# Patient Record
Sex: FEMALE | Race: WHITE | NOT HISPANIC OR LATINO | Employment: OTHER | URBAN - METROPOLITAN AREA
[De-identification: names, ages, dates, MRNs, and addresses within clinical notes are randomized per-mention and may not be internally consistent; named-entity substitution may affect disease eponyms.]

---

## 2017-01-04 DIAGNOSIS — Z23 NEED FOR VACCINATION WITH 13-POLYVALENT PNEUMOCOCCAL CONJUGATE VACCINE: ICD-10-CM

## 2017-01-04 PROCEDURE — 90670 PCV13 VACCINE IM: CPT | Performed by: FAMILY MEDICINE

## 2017-01-04 PROCEDURE — G0009 ADMIN PNEUMOCOCCAL VACCINE: HCPCS | Performed by: FAMILY MEDICINE

## 2017-01-05 DIAGNOSIS — Z13.228 SCREENING FOR METABOLIC DISORDER: ICD-10-CM

## 2017-01-05 DIAGNOSIS — Z13.220 ENCOUNTER FOR LIPID SCREENING FOR CARDIOVASCULAR DISEASE: ICD-10-CM

## 2017-01-05 DIAGNOSIS — Z13.6 ENCOUNTER FOR LIPID SCREENING FOR CARDIOVASCULAR DISEASE: ICD-10-CM

## 2017-01-05 LAB
ALBUMIN SERPL-MCNC: 3.7 G/DL (ref 3.4–5)
ALP SERPL-CCNC: 69 U/L (ref 40–150)
ALT SERPL W P-5'-P-CCNC: 32 U/L (ref 0–50)
ANION GAP SERPL CALCULATED.3IONS-SCNC: 6 MMOL/L (ref 3–14)
AST SERPL W P-5'-P-CCNC: 14 U/L (ref 0–45)
BILIRUB SERPL-MCNC: 1.2 MG/DL (ref 0.2–1.3)
BUN SERPL-MCNC: 15 MG/DL (ref 7–30)
CALCIUM SERPL-MCNC: 9 MG/DL (ref 8.5–10.1)
CHLORIDE SERPL-SCNC: 108 MMOL/L (ref 94–109)
CHOLEST SERPL-MCNC: 179 MG/DL
CO2 SERPL-SCNC: 30 MMOL/L (ref 20–32)
CREAT SERPL-MCNC: 0.9 MG/DL (ref 0.52–1.04)
GFR SERPL CREATININE-BSD FRML MDRD: 63 ML/MIN/1.7M2
GLUCOSE SERPL-MCNC: 91 MG/DL (ref 70–99)
HDLC SERPL-MCNC: 50 MG/DL
LDLC SERPL CALC-MCNC: 101 MG/DL
NONHDLC SERPL-MCNC: 129 MG/DL
POTASSIUM SERPL-SCNC: 3.7 MMOL/L (ref 3.4–5.3)
PROT SERPL-MCNC: 6.7 G/DL (ref 6.8–8.8)
SODIUM SERPL-SCNC: 144 MMOL/L (ref 133–144)
TRIGL SERPL-MCNC: 140 MG/DL

## 2017-01-05 PROCEDURE — 80053 COMPREHEN METABOLIC PANEL: CPT | Performed by: OBSTETRICS & GYNECOLOGY

## 2017-01-05 PROCEDURE — 36415 COLL VENOUS BLD VENIPUNCTURE: CPT | Performed by: OBSTETRICS & GYNECOLOGY

## 2017-01-05 PROCEDURE — 80061 LIPID PANEL: CPT | Performed by: OBSTETRICS & GYNECOLOGY

## 2017-01-17 ENCOUNTER — TRANSFERRED RECORDS (OUTPATIENT)
Dept: FAMILY MEDICINE | Facility: CLINIC | Age: 67
End: 2017-01-17

## 2017-02-18 ENCOUNTER — HOSPITAL ENCOUNTER (EMERGENCY)
Facility: CLINIC | Age: 67
Discharge: HOME | End: 2017-02-18
Attending: EMERGENCY MEDICINE | Admitting: EMERGENCY MEDICINE
Payer: MEDICARE

## 2017-02-18 ENCOUNTER — APPOINTMENT (OUTPATIENT)
Dept: GENERAL RADIOLOGY | Facility: CLINIC | Age: 67
End: 2017-02-18
Attending: EMERGENCY MEDICINE
Payer: MEDICARE

## 2017-02-18 VITALS
HEART RATE: 52 BPM | TEMPERATURE: 98.2 F | RESPIRATION RATE: 18 BRPM | BODY MASS INDEX: 27.31 KG/M2 | WEIGHT: 160 LBS | SYSTOLIC BLOOD PRESSURE: 133 MMHG | HEIGHT: 64 IN | OXYGEN SATURATION: 94 % | DIASTOLIC BLOOD PRESSURE: 62 MMHG

## 2017-02-18 DIAGNOSIS — R13.10 DYSPHAGIA, UNSPECIFIED TYPE: ICD-10-CM

## 2017-02-18 DIAGNOSIS — R35.0 URINARY FREQUENCY: ICD-10-CM

## 2017-02-18 LAB
ALBUMIN UR-MCNC: NEGATIVE MG/DL
ANION GAP SERPL CALCULATED.3IONS-SCNC: 9 MMOL/L (ref 3–14)
APPEARANCE UR: CLEAR
BASOPHILS # BLD AUTO: 0 10E9/L (ref 0–0.2)
BASOPHILS NFR BLD AUTO: 0.7 %
BILIRUB UR QL STRIP: NEGATIVE
BUN SERPL-MCNC: 13 MG/DL (ref 7–30)
CALCIUM SERPL-MCNC: 8.6 MG/DL (ref 8.5–10.1)
CHLORIDE SERPL-SCNC: 109 MMOL/L (ref 94–109)
CO2 SERPL-SCNC: 26 MMOL/L (ref 20–32)
COLOR UR AUTO: YELLOW
CREAT SERPL-MCNC: 0.85 MG/DL (ref 0.52–1.04)
DIFFERENTIAL METHOD BLD: ABNORMAL
EOSINOPHIL # BLD AUTO: 0.1 10E9/L (ref 0–0.7)
EOSINOPHIL NFR BLD AUTO: 1.5 %
ERYTHROCYTE [DISTWIDTH] IN BLOOD BY AUTOMATED COUNT: 12.3 % (ref 10–15)
GFR SERPL CREATININE-BSD FRML MDRD: 67 ML/MIN/1.7M2
GLUCOSE SERPL-MCNC: 97 MG/DL (ref 70–99)
GLUCOSE UR STRIP-MCNC: NEGATIVE MG/DL
HCT VFR BLD AUTO: 41.1 % (ref 35–47)
HGB BLD-MCNC: 14.5 G/DL (ref 11.7–15.7)
HGB UR QL STRIP: ABNORMAL
IMM GRANULOCYTES # BLD: 0 10E9/L (ref 0–0.4)
IMM GRANULOCYTES NFR BLD: 0.2 %
INTERPRETATION ECG - MUSE: NORMAL
KETONES UR STRIP-MCNC: NEGATIVE MG/DL
LEUKOCYTE ESTERASE UR QL STRIP: NEGATIVE
LYMPHOCYTES # BLD AUTO: 1.9 10E9/L (ref 0.8–5.3)
LYMPHOCYTES NFR BLD AUTO: 32.1 %
MCH RBC QN AUTO: 34.3 PG (ref 26.5–33)
MCHC RBC AUTO-ENTMCNC: 35.3 G/DL (ref 31.5–36.5)
MCV RBC AUTO: 97 FL (ref 78–100)
MONOCYTES # BLD AUTO: 0.5 10E9/L (ref 0–1.3)
MONOCYTES NFR BLD AUTO: 7.8 %
NEUTROPHILS # BLD AUTO: 3.4 10E9/L (ref 1.6–8.3)
NEUTROPHILS NFR BLD AUTO: 57.7 %
NITRATE UR QL: NEGATIVE
NON-SQ EPI CELLS #/AREA URNS LPF: ABNORMAL /LPF
NRBC # BLD AUTO: 0 10*3/UL
NRBC BLD AUTO-RTO: 0 /100
PH UR STRIP: 6.5 PH (ref 5–7)
PLATELET # BLD AUTO: 206 10E9/L (ref 150–450)
POTASSIUM SERPL-SCNC: 3.3 MMOL/L (ref 3.4–5.3)
RBC # BLD AUTO: 4.23 10E12/L (ref 3.8–5.2)
RBC #/AREA URNS AUTO: ABNORMAL /HPF (ref 0–2)
RENAL EPI CELLS #/AREA URNS HPF: ABNORMAL /HPF
SODIUM SERPL-SCNC: 144 MMOL/L (ref 133–144)
SP GR UR STRIP: 1.01 (ref 1–1.03)
TSH SERPL DL<=0.005 MIU/L-ACNC: 2.19 MU/L (ref 0.4–4)
URN SPEC COLLECT METH UR: ABNORMAL
UROBILINOGEN UR STRIP-ACNC: 0.2 EU/DL (ref 0.2–1)
WBC # BLD AUTO: 5.9 10E9/L (ref 4–11)
WBC #/AREA URNS AUTO: ABNORMAL /HPF (ref 0–2)

## 2017-02-18 PROCEDURE — 81001 URINALYSIS AUTO W/SCOPE: CPT | Performed by: EMERGENCY MEDICINE

## 2017-02-18 PROCEDURE — 84443 ASSAY THYROID STIM HORMONE: CPT | Performed by: EMERGENCY MEDICINE

## 2017-02-18 PROCEDURE — 99285 EMERGENCY DEPT VISIT HI MDM: CPT | Mod: 25

## 2017-02-18 PROCEDURE — 85025 COMPLETE CBC W/AUTO DIFF WBC: CPT | Performed by: EMERGENCY MEDICINE

## 2017-02-18 PROCEDURE — 71020 XR CHEST 2 VW: CPT

## 2017-02-18 PROCEDURE — 80048 BASIC METABOLIC PNL TOTAL CA: CPT | Performed by: EMERGENCY MEDICINE

## 2017-02-18 PROCEDURE — 25000132 ZZH RX MED GY IP 250 OP 250 PS 637: Mod: GY | Performed by: EMERGENCY MEDICINE

## 2017-02-18 PROCEDURE — A9270 NON-COVERED ITEM OR SERVICE: HCPCS | Mod: GY | Performed by: EMERGENCY MEDICINE

## 2017-02-18 PROCEDURE — 93005 ELECTROCARDIOGRAM TRACING: CPT

## 2017-02-18 RX ORDER — POTASSIUM CHLORIDE 1500 MG/1
20 TABLET, EXTENDED RELEASE ORAL ONCE
Status: COMPLETED | OUTPATIENT
Start: 2017-02-18 | End: 2017-02-18

## 2017-02-18 RX ADMIN — POTASSIUM CHLORIDE 20 MEQ: 1500 TABLET, EXTENDED RELEASE ORAL at 20:42

## 2017-02-18 ASSESSMENT — ENCOUNTER SYMPTOMS
FATIGUE: 0
UNEXPECTED WEIGHT CHANGE: 0
FEVER: 0
NAUSEA: 0
NUMBNESS: 0
DYSURIA: 0
SHORTNESS OF BREATH: 1
WEAKNESS: 0
VOMITING: 0

## 2017-02-18 NOTE — ED AVS SNAPSHOT
Emergency Department    [address] [facility] [address]    Phone:  [phone]    Fax:  [phone]                                       Giovana Key   MRN: [id number]    Department:   Emergency Department   Date of Visit:  2/18/2017           Patient Information     Date Of Birth          1950        Your diagnoses for this visit were:     Dysphagia, unspecified type     Urinary frequency        You were seen by Himanshu Ralph DO.      Follow-up Information     Follow up with Nidia Pierce MD In 3 days.    Specialty:  Family Practice    Contact information:    [facility]  [address]  [facility] [address] [phone]          Follow up with  Emergency Department.    Specialty:  EMERGENCY MEDICINE    Why:  If symptoms worsen    Contact information:    [address] [facility] [address] [phone]        Discharge Instructions       Return to the emergency department or seek medical care as instructed if your symptoms fail to improve or significantly worsen.    Follow-up as indicated on page 1.  Maintain adequate hydration and get plenty of rest.      Discharge References/Attachments     DYSPHAGIA, UNDERSTANDING (ENGLISH)      24 Hour Appointment Hotline       To make an appointment at any [facility], call [phone] ([phone]). If you don't have a family doctor or clinic, we will help you find one. [place] clinics are conveniently located to serve the needs of you and your family.             Review of your medicines      Our records show that you are taking the medicines listed below. If these are incorrect, please call your family doctor or clinic.        Dose / Directions Last dose taken    ALPRAZolam 0.5 MG tablet   Commonly known as:  XANAX   Dose:  0.5 mg        Take 0.5 mg by mouth once as needed   Refills:  0        BIOTIN 5000 PO   Dose:  1 chew tab        Take 1 chew tab by mouth daily   Refills:   0        CALCIUM-VITAMIN D PO        Refills:  0        CYTOMEL PO   Dose:  2 tablet        Take 2 tablets by mouth daily 1 day 1 tablet and the next day 2 tablets   Refills:  0        * MULTIVITAMIN ADULT PO   Dose:  1 tablet        Take 1 tablet by mouth daily   Refills:  0        * ALIVE WOMENS GUMMY Chew   Dose:  1 chew tab        Take 1 chew tab by mouth daily   Refills:  0        rizatriptan 10 MG tablet   Commonly known as:  MAXALT   Dose:  10 mg   Quantity:  18 tablet        Take 1 tablet (10 mg) by mouth at onset of headache for migraine May repeat dose in 2 hours.  Do not exceed 30 mg in 24 hours   Refills:  0        SYNTHROID PO   Dose:  88 mcg        Take 88 mcg by mouth daily   Refills:  0        TESTOSTERONE IM        Inject  into the muscle See Admin Instructions.  q 2 weeks   Refills:  0        UNABLE TO FIND   Dose:  15 %   Indication:  1/4 tsp        15 % daily MEDICATION NAME: progesterone cream Applies a 1/4 teaspoon daily   Refills:  0        * Notice:  This list has 2 medication(s) that are the same as other medications prescribed for you. Read the directions carefully, and ask your doctor or other care provider to review them with you.            Procedures and tests performed during your visit     *UA reflex to Microscopic    Basic metabolic panel    CBC with platelets differential    EKG 12-lead, tracing only    TSH with free T4 reflex    Urine Microscopic    XR Chest 2 Views      Orders Needing Specimen Collection     None      Pending Results     Date and Time Order Name Status Description    2/18/2017 1932 EKG 12-lead, tracing only Preliminary             Pending Culture Results     No orders found from 2/16/2017 to 2/19/2017.             Test Results from your hospital stay     2/18/2017  7:51 PM - Interface, Flexilab Results      Component Results     Component Value Ref Range & Units Status    Color Urine Yellow  Final    Appearance Urine Clear  Final    Glucose Urine Negative NEG  mg/dL Final    Bilirubin Urine Negative NEG Final    Ketones Urine Negative NEG mg/dL Final    Specific Gravity Urine 1.010 1.003 - 1.035 Final    Blood Urine Trace (A) NEG Final    pH Urine 6.5 5.0 - 7.0 pH Final    Protein Albumin Urine Negative NEG mg/dL Final    Urobilinogen Urine 0.2 0.2 - 1.0 EU/dL Final    Nitrite Urine Negative NEG Final    Leukocyte Esterase Urine Negative NEG Final    Source Midstream Urine  Final         2/18/2017  7:57 PM - Interface, Flexilab Results      Component Results     Component Value Ref Range & Units Status    WBC 5.9 4.0 - 11.0 10e9/L Final    RBC Count 4.23 3.8 - 5.2 10e12/L Final    Hemoglobin 14.5 11.7 - 15.7 g/dL Final    Hematocrit 41.1 35.0 - 47.0 % Final    MCV 97 78 - 100 fl Final    MCH 34.3 (H) 26.5 - 33.0 pg Final    MCHC 35.3 31.5 - 36.5 g/dL Final    RDW 12.3 10.0 - 15.0 % Final    Platelet Count 206 150 - 450 10e9/L Final    Diff Method Automated Method  Final    % Neutrophils 57.7 % Final    % Lymphocytes 32.1 % Final    % Monocytes 7.8 % Final    % Eosinophils 1.5 % Final    % Basophils 0.7 % Final    % Immature Granulocytes 0.2 % Final    Nucleated RBCs 0 0 /100 Final    Absolute Neutrophil 3.4 1.6 - 8.3 10e9/L Final    Absolute Lymphocytes 1.9 0.8 - 5.3 10e9/L Final    Absolute Monocytes 0.5 0.0 - 1.3 10e9/L Final    Absolute Eosinophils 0.1 0.0 - 0.7 10e9/L Final    Absolute Basophils 0.0 0.0 - 0.2 10e9/L Final    Abs Immature Granulocytes 0.0 0 - 0.4 10e9/L Final    Absolute Nucleated RBC 0.0  Final         2/18/2017  8:10 PM - Interface, Flexilab Results      Component Results     Component Value Ref Range & Units Status    Sodium 144 133 - 144 mmol/L Final    Potassium 3.3 (L) 3.4 - 5.3 mmol/L Final    Chloride 109 94 - 109 mmol/L Final    Carbon Dioxide 26 20 - 32 mmol/L Final    Anion Gap 9 3 - 14 mmol/L Final    Glucose 97 70 - 99 mg/dL Final    Urea Nitrogen 13 7 - 30 mg/dL Final    Creatinine 0.85 0.52 - 1.04 mg/dL Final    GFR Estimate 67 >60  mL/min/1.7m2 Final    Non  GFR Calc    GFR Estimate If Black 81 >60 mL/min/1.7m2 Final    African American GFR Calc    Calcium 8.6 8.5 - 10.1 mg/dL Final         2/18/2017  8:44 PM - Interface, Radiant Ib      Narrative     CHEST TWO VIEWS  2/18/2017 7:53 PM     HISTORY: Dysphagia.    COMPARISON: None.        Impression     IMPRESSION: Lungs are clear. Mild cardiomegaly. Normal pulmonary  vasculature. No pleural effusion. Calcified breast implants.     VIN HUFF MD         2/18/2017  8:20 PM - Interface, Flexilab Results      Component Results     Component Value Ref Range & Units Status    TSH 2.19 0.40 - 4.00 mU/L Final         2/18/2017  7:51 PM - Interface, Flexilab Results      Component Results     Component Value Ref Range & Units Status    WBC Urine O - 2 0 - 2 /HPF Final    RBC Urine O - 2 0 - 2 /HPF Final    Squamous Epithelial /LPF Urine Few FEW /LPF Final    Renal Tub Epi Few (A) NEG /HPF Final                Clinical Quality Measure: Blood Pressure Screening     Your blood pressure was checked while you were in the emergency department today. The last reading we obtained was  BP: 133/62 . Please read the guidelines below about what these numbers mean and what you should do about them.  If your systolic blood pressure (the top number) is less than 120 and your diastolic blood pressure (the bottom number) is less than 80, then your blood pressure is normal. There is nothing more that you need to do about it.  If your systolic blood pressure (the top number) is 120-139 or your diastolic blood pressure (the bottom number) is 80-89, your blood pressure may be higher than it should be. You should have your blood pressure rechecked within a year by a primary care provider.  If your systolic blood pressure (the top number) is 140 or greater or your diastolic blood pressure (the bottom number) is 90 or greater, you may have high blood pressure. High blood pressure is treatable, but if left  "untreated over time it can put you at risk for heart attack, stroke, or kidney failure. You should have your blood pressure rechecked by a primary care provider within the next 4 weeks.  If your provider in the emergency department today gave you specific instructions to follow-up with your doctor or provider even sooner than that, you should follow that instruction and not wait for up to 4 weeks for your follow-up visit.        Thank you for choosing [facility]       Thank you for choosing [facility] for your care. Our goal is always to provide you with excellent care. Hearing back from our patients is one way we can continue to improve our services. Please take a few minutes to complete the written survey that you may receive in the mail after you visit with us. Thank you!        [facility] Information     [employer] lets you send messages to your doctor, view your test results, renew your prescriptions, schedule appointments and more. To sign up, go to [email or web address] . Click on \"Log in\" on the left side of the screen, which will take you to the Welcome page. Then click on \"Sign up Now\" on the right side of the page.     You will be asked to enter the access code listed below, as well as some personal information. Please follow the directions to create your username and password.     Your access code is: -[email or web address]  Expires: 2017  9:20 PM     Your access code will  in 90 days. If you need help or a new code, please call your [facility] clinic or [phone].        Care EveryWhere ID     This is your Care EveryWhere ID. This could be used by other organizations to access your [facility] medical records  [id number]        After Visit Summary       This is your record. Keep this with you and show to your community pharmacist(s) and doctor(s) at your next visit.                  "

## 2017-02-18 NOTE — ED AVS SNAPSHOT
Emergency Department    [address] [place] [address]    Phone:  [phone]    Fax:  [phone]                                       Giovana Key   MRN: [id number]    Department:   Emergency Department   Date of Visit:  2/18/2017           After Visit Summary Signature Page     I have received my discharge instructions, and my questions have been answered. I have discussed any challenges I see with this plan with the nurse or doctor.    ..........................................................................................................................................  Patient/Patient Representative Signature      ..........................................................................................................................................  Patient Representative Print Name and Relationship to Patient    ..................................................               ................................................  Date                                            Time    ..........................................................................................................................................  Reviewed by Signature/Title    ...................................................              ..............................................  Date                                                            Time

## 2017-02-19 NOTE — DISCHARGE INSTRUCTIONS
Return to the emergency department or seek medical care as instructed if your symptoms fail to improve or significantly worsen.    Follow-up as indicated on page 1.  Maintain adequate hydration and get plenty of rest.

## 2017-02-19 NOTE — ED PROVIDER NOTES
"  History     Chief Complaint:  Lightheadedness and dysphagia    HPI   Giovana Key is a 66 year old female who presents with lightheadedness and dysphagia. Earlier tonight, the patient began experiencing a 2 hour episode of lightheadedness and \"difficulty breathing out\" which began while she was sitting. She also states that for the past couple months she has needed to focus on swallowing liquids and foods. The patient has been experiencing intermittent diaphoresis at night for months. She is also experiencing increased urinary urgency. The patient is speaking and breathing normally currently. She is able to swallow liquids and solids normally. She was not feeling anxious or stressed at the time of symptom onset. No vomiting after eating. No pain with swallowing. No chest pain, shortness of breath, or new leg swelling. No fevers, chills, nausea, or vomiting. No numbness, tingling, or weakness. No fatigue. No dysuria. No recent major weight change. No history of blood clots. The patient has not had her thyroid checked recently. She has never had an anxiety attack. She states that her symptoms are different from her usual reflux symptoms.    Allergies:  Imitrex  Codeine  Augmentin  Metronidazole    Medications:    Calcium-vitamin D  Biotin  Multivitamin  Levothyroxine  Xanax  Cytomel  Testosterone     Past Medical History:    Diverticulitis  GERD  Graves disease   Thyroid disease  WPW syndrome    Past Surgical History:    Cholecystectomy  ENT surgery  Hysterectomy    Family History:    History reviewed. No pertinent family history.    Social History:  Marital Status:   Presents to the ED alone  Tobacco Use: former smoker  Alcohol Use: negative  PCP: Nidia Pierce     Review of Systems   Constitutional: Negative for fatigue, fever and unexpected weight change.   HENT:        Positive for foreign body sensation in the throat  Positive for difficulty swallowing   Respiratory: Positive for shortness of breath.  " "  Cardiovascular: Negative for chest pain and leg swelling.   Gastrointestinal: Negative for nausea and vomiting.   Genitourinary: Positive for urgency. Negative for dysuria.   Neurological: Negative for weakness and numbness.   All other systems reviewed and are negative.    Physical Exam   First Vitals:  BP: 181/69  Pulse: 64  Temp: 98.2  F (36.8  C)  Resp: 18  Height: 162.6 cm (5' 4\")  Weight: 72.6 kg (160 lb)  SpO2: 97 %    Physical Exam  General: Alert and cooperative with exam. Patient in mild distress. Normal mentation  Head:  Scalp is NC/AT  Eyes:  No scleral icterus, PERRL,   ENT:  The external nose and ears are normal. The oropharynx is normal and without erythema; mucus membranes are moist. Uvula midline, no evidence of deep space infection. No trismus. Tolerating oral secretions without difficulty.  Neck:  Normal range of motion without rigidity.   CV:  Regular rate and rhythm    No pathologic murmur   Resp:  Breath sounds are clear bilaterally    Non-labored, no retractions or accessory muscle use  GI:  Abdomen is soft, no distension, no tenderness. Obesity.  MS:  No lower extremity edema   Skin:  Warm and dry, No rash or lesions noted.  Neuro: Oriented x 3. No gross motor deficits.     Emergency Department Course   ECG:  @ 2037  Indication: lightheadedness  Vent. Rate 52 bpm. OH interval 146 ms. QRS duration 72 ms. QT/QTc 456/424 ms. P-R-T axis 60 0 52.   Sinus bradycardia. Anterior infarct, age undetermined. Abnormal ECG.  Read @ 2045 by Dr. Ralph.    Imaging:  Radiographic findings were communicated with the patient who voiced understanding of the findings.    XR Chest 2 Views  Lungs are clear. Mild cardiomegaly. Normal pulmonary  vasculature. No pleural effusion. Calcified breast implants.   Report per radiology.    Laboratory:  CBC:  WBC 5.9, HGB 14.5,   BMP: potassium 3.3 (L), otherwise WNL (Creatinine 0.85)  TSH with free T4 reflex: 2.19    UA: Clear yellow urine, blood trace, renal tub " epi few, otherwise WNL    Interventions:  (2042) potassium chloride, 20 mEq, PO    Emergency Department Course:  Nursing notes and vitals reviewed.  I performed an exam of the patient as documented above.   EKG was done, interpretation as above.  Blood was drawn from the patient. This was sent for laboratory testing, findings above.   Urine sample was obtained and sent for laboratory analysis, findings above.  The patient was sent for a chest x-ray while in the emergency department, findings above.   (2109) I rechecked the patient and updated her on her results.   Findings and plan explained to the patient. Patient discharged home with instructions regarding supportive care, medications, and reasons to return. The importance of close follow-up was reviewed.     Impression & Plan    Medical Decision Making:  Patient is a 66 year old female who presents with intermittent dysphagia and concern urinary frequency. Patient s medical history and records were reviewed. Initial consideration for, but not limited to, electrolyte abnormality, thyroid dysfunction, atypical cardiac symptoms, mass, globus sensation, anxiety, other neoplastic process, among others. Labs, EKG, and imaging was obtained. EKG demonstrates no evidence of acute ischemia or infarction; patient is without chest pain or shortness of breath. Symptoms occur only with swallowing and patient is able to swallow both solids and liquids. Chest x-ray shows no acute pathology with only mild cardiomegaly. Labs unremarkable including UA with exception of mild hypokalemia (3.3, provided oral replacement). At this time, no emergent cause for the patient s symptoms could be determined. Recommended close follow up with PCP for possible future testing and possible referral to GI as appropriate. Return precautions were discussed. At the time of discharge, patient was hemodynamically stable, neurologically intact, and afebrile.    Diagnosis:    ICD-10-CM    1. Dysphagia,  unspecified type R13.10    2. Urinary frequency R35.0        I, Marcus Cohen, am serving as a scribe on 2/18/2017 at 7:13 PM to personally document services performed by Dr. Ralph based on my observations and the provider's statements to me.        Ramo, Himanshu Loyola, DO  02/19/17 1145

## 2017-02-27 ENCOUNTER — OFFICE VISIT (OUTPATIENT)
Dept: FAMILY MEDICINE | Facility: CLINIC | Age: 67
End: 2017-02-27

## 2017-02-27 VITALS
OXYGEN SATURATION: 97 % | HEART RATE: 58 BPM | SYSTOLIC BLOOD PRESSURE: 128 MMHG | WEIGHT: 165 LBS | BODY MASS INDEX: 28.32 KG/M2 | TEMPERATURE: 97.6 F | DIASTOLIC BLOOD PRESSURE: 72 MMHG

## 2017-02-27 DIAGNOSIS — R10.13 DYSPEPSIA: ICD-10-CM

## 2017-02-27 DIAGNOSIS — R13.13 PHARYNGEAL DYSPHAGIA: Primary | ICD-10-CM

## 2017-02-27 PROCEDURE — 99214 OFFICE O/P EST MOD 30 MIN: CPT | Performed by: FAMILY MEDICINE

## 2017-02-27 NOTE — MR AVS SNAPSHOT
After Visit Summary   2/27/2017    Giovana Key    MRN: [id number]           Patient Information     Date Of Birth          1950        Visit Information        Provider Department      2/27/2017 10:45 AM Nidia Pierce MD [facility]        Today's Diagnoses     Pharyngeal dysphagia    -  1    Dyspepsia           Follow-ups after your visit        Additional Services     GASTROENTEROLOGY ADULT REF CONSULT ONLY       Preferred Location: [facility]    Please be aware that coverage of these services is subject to the terms and limitations of your health insurance plan.  Call member services at your health plan with any benefit or coverage questions.  Any procedures must be performed at a [facility] facility OR coordinated by your clinic's referral office.    Please bring the following with you to your appointment:    (1) Any X-Rays, CTs or MRIs which have been performed.  Contact the facility where they were done to arrange for  prior to your scheduled appointment.    (2) List of current medications   (3) This referral request   (4) Any documents/labs given to you for this referral                  Who to contact     If you have questions or need follow up information about today's clinic visit or your schedule please contact [facility] directly at [phone].  Normal or non-critical lab and imaging results will be communicated to you by MyChart, letter or phone within 4 business days after the clinic has received the results. If you do not hear from us within 7 days, please contact the clinic through MyChart or phone. If you have a critical or abnormal lab result, we will notify you by phone as soon as possible.  Submit refill requests through [employer] or call your pharmacy and they will forward the refill request to us. Please allow 3 business days for your refill to be completed.          Additional Information About Your Visit        MyChart  "Information     [employer] lets you send messages to your doctor, view your test results, renew your prescriptions, schedule appointments and more. To sign up, go to [email or web address] . Click on \"Log in\" on the left side of the screen, which will take you to the Welcome page. Then click on \"Sign up Now\" on the right side of the page.     You will be asked to enter the access code listed below, as well as some personal information. Please follow the directions to create your username and password.     Your access code is: -[email or web address]  Expires: 2017 10:20 PM     Your access code will  in 90 days. If you need help or a new code, please call your [facility] clinic or [phone].        Care EveryWhere ID     This is your Care EveryWhere ID. This could be used by other organizations to access your [facility] medical records  [id number]        Your Vitals Were     Pulse Temperature Pulse Oximetry BMI (Body Mass Index)          58 97.6  F (36.4  C) (Oral) 97% 28.32 kg/m2         Blood Pressure from Last 3 Encounters:   17 128/72   17 133/62   16 112/70    Weight from Last 3 Encounters:   17 74.8 kg (165 lb)   17 72.6 kg (160 lb)   16 73.9 kg (163 lb)              We Performed the Following     GASTROENTEROLOGY ADULT REF CONSULT ONLY          Today's Medication Changes          These changes are accurate as of: 17 11:59 PM.  If you have any questions, ask your nurse or doctor.               Start taking these medicines.        Dose/Directions    omeprazole 20 MG CR capsule   Commonly known as:  priLOSEC   Used for:  Dyspepsia, Pharyngeal dysphagia   Started by:  Nidia Pierce MD        Dose:  20 mg   Take 1 capsule (20 mg) by mouth daily   Quantity:  30 capsule   Refills:  1            Where to get your medicines      These medications were sent to [id number] - [place], [place] - [address] AT [address]  [address], " [facility] [address]     Phone:  [phone]     omeprazole 20 MG CR capsule                Primary Care Provider Office Phone # Fax #    Nidia Pierce [phone]       [facility] [address]  [facility] [address]        Thank you!     Thank you for choosing [facility]  for your care. Our goal is always to provide you with excellent care. Hearing back from our patients is one way we can continue to improve our services. Please take a few minutes to complete the written survey that you may receive in the mail after your visit with us. Thank you!             Your Updated Medication List - Protect others around you: Learn how to safely use, store and throw away your medicines at www.disposemymeds.org.          This list is accurate as of: 2/27/17 11:59 PM.  Always use your most recent med list.                   Brand Name Dispense Instructions for use    ALPRAZolam 0.5 MG tablet    XANAX     Take 0.5 mg by mouth once as needed Reported on 2/27/2017       BIOTIN 5000 PO      Take 1 chew tab by mouth daily       CALCIUM-VITAMIN D PO          CYTOMEL PO      Take 2 tablets by mouth daily 1 day 1 tablet and the next day 2 tablets       * MULTIVITAMIN ADULT PO      Take 1 tablet by mouth daily       * ALIVE WOMENS GUMMY Chew      Take 1 chew tab by mouth daily       omeprazole 20 MG CR capsule    priLOSEC    30 capsule    Take 1 capsule (20 mg) by mouth daily       rizatriptan 10 MG tablet    MAXALT    18 tablet    Take 1 tablet (10 mg) by mouth at onset of headache for migraine May repeat dose in 2 hours.  Do not exceed 30 mg in 24 hours       SYNTHROID PO      Take 88 mcg by mouth daily       TESTOSTERONE IM      Inject  into the muscle See Admin Instructions.  q 2 weeks       UNABLE TO FIND      15 % daily MEDICATION NAME: progesterone cream Applies a 1/4 teaspoon daily       * Notice:  This list has 2 medication(s) that are the same as other medications  prescribed for you. Read the directions carefully, and ask your doctor or other care provider to review them with you.

## 2017-02-27 NOTE — PROGRESS NOTES
"Problem(s) Oriented visit        SUBJECTIVE:                                                    Giovana Key is a 66 year old female who presents to clinic today for ER follow up. She has intermittent issues with her throat feeling as though she cannot swallow normally. As she tries to swallow she will need to \"work at it\" and then feels as though she cannot breath out as well. She has had these episodes for years. At one point she felt as though she choked a bit and was seen by an ENT who apparently scoped her and told her everything was fine. She has been trying to think if there is a particular food that causes it. She thinks that smoked foods may be worse, but certainly isn't limited to smoked foods. She estimates that there is a small component of this every day, but only occasionally has a bad episode. She thinks she has acid reflux when she sleeps as she will wake with a sense of heartburn. She drinks one cup of coffee every morning, sometimes a second later in the day. No other caffeine. She denies any significant citrus, no tobacco, one to two beers nightly. She already avoids mint. She avoids ASA and Ibuprofen.      Problem list, Medication list, Allergies, and Medical/Social/Surgical histories reviewed in [facility] and updated as appropriate.   Additional history: as documented    ROS:  5 point ROS completed and negative except noted above, including Gen, CV, Resp, GI, MS      Histories:   Patient Active Problem List   Diagnosis     Migraine     heel pain     Peroneal tendonitis     Pain in joint, ankle and foot     Other enthesopathy of ankle and tarsus     Health Care Home     Advanced directives, counseling/discussion     Diverticulitis of colon     Graves disease     Pharyngeal dysphagia     Past Surgical History   Procedure Laterality Date     Cholecystectomy       Ent surgery       Hysterectomy vaginal  1997     Dr Grier       Social History   Substance Use Topics     Smoking status: Former Smoker     " Types: Cigarettes     Quit date: 1/1/1983     Smokeless tobacco: Never Used     Alcohol use No     Family History   Problem Relation Age of Onset     Hypertension             OBJECTIVE:                                                    /72 (BP Location: Right arm)  Pulse 58  Temp 97.6  F (36.4  C) (Oral)  Wt 74.8 kg (165 lb)  SpO2 97%  BMI 28.32 kg/m2  Body mass index is 28.32 kg/(m^2).   APPEARANCE: = Relaxed and in no distress  Neck = No anterior or posterior adenopathy appreciated.  Thyroid = Not enlarged and no masses felt  Resp effort = Calm regular breathing  Breath Sounds = Good air movement with no rales or rhonchi in any lung fields  Heart Rate, Rythym, & sounds (no Murm)  = Regular rate and rythym with no S3, S4, or murmer appreciated.  Abdomen = Soft, nontender, no masses, & bowel sounds in all quadrants  Liver/Spleen = Normal span and no splenomegaly noted   Labs Resulted Today:   Results for orders placed or performed during the hospital encounter of 02/18/17   XR Chest 2 Views    Narrative    CHEST TWO VIEWS  2/18/2017 7:53 PM     HISTORY: Dysphagia.    COMPARISON: None.      Impression    IMPRESSION: Lungs are clear. Mild cardiomegaly. Normal pulmonary  vasculature. No pleural effusion. Calcified breast implants.     VIN HUFF MD   *UA reflex to Microscopic   Result Value Ref Range    Color Urine Yellow     Appearance Urine Clear     Glucose Urine Negative NEG mg/dL    Bilirubin Urine Negative NEG    Ketones Urine Negative NEG mg/dL    Specific Gravity Urine 1.010 1.003 - 1.035    Blood Urine Trace (A) NEG    pH Urine 6.5 5.0 - 7.0 pH    Protein Albumin Urine Negative NEG mg/dL    Urobilinogen Urine 0.2 0.2 - 1.0 EU/dL    Nitrite Urine Negative NEG    Leukocyte Esterase Urine Negative NEG    Source Midstream Urine    CBC with platelets differential   Result Value Ref Range    WBC 5.9 4.0 - 11.0 10e9/L    RBC Count 4.23 3.8 - 5.2 10e12/L    Hemoglobin 14.5 11.7 - 15.7 g/dL    Hematocrit  41.1 35.0 - 47.0 %    MCV 97 78 - 100 fl    MCH 34.3 (H) 26.5 - 33.0 pg    MCHC 35.3 31.5 - 36.5 g/dL    RDW 12.3 10.0 - 15.0 %    Platelet Count 206 150 - 450 10e9/L    Diff Method Automated Method     % Neutrophils 57.7 %    % Lymphocytes 32.1 %    % Monocytes 7.8 %    % Eosinophils 1.5 %    % Basophils 0.7 %    % Immature Granulocytes 0.2 %    Nucleated RBCs 0 0 /100    Absolute Neutrophil 3.4 1.6 - 8.3 10e9/L    Absolute Lymphocytes 1.9 0.8 - 5.3 10e9/L    Absolute Monocytes 0.5 0.0 - 1.3 10e9/L    Absolute Eosinophils 0.1 0.0 - 0.7 10e9/L    Absolute Basophils 0.0 0.0 - 0.2 10e9/L    Abs Immature Granulocytes 0.0 0 - 0.4 10e9/L    Absolute Nucleated RBC 0.0    Basic metabolic panel   Result Value Ref Range    Sodium 144 133 - 144 mmol/L    Potassium 3.3 (L) 3.4 - 5.3 mmol/L    Chloride 109 94 - 109 mmol/L    Carbon Dioxide 26 20 - 32 mmol/L    Anion Gap 9 3 - 14 mmol/L    Glucose 97 70 - 99 mg/dL    Urea Nitrogen 13 7 - 30 mg/dL    Creatinine 0.85 0.52 - 1.04 mg/dL    GFR Estimate 67 >60 mL/min/1.7m2    GFR Estimate If Black 81 >60 mL/min/1.7m2    Calcium 8.6 8.5 - 10.1 mg/dL   TSH with free T4 reflex   Result Value Ref Range    TSH 2.19 0.40 - 4.00 mU/L   Urine Microscopic   Result Value Ref Range    WBC Urine O - 2 0 - 2 /HPF    RBC Urine O - 2 0 - 2 /HPF    Squamous Epithelial /LPF Urine Few FEW /LPF    Renal Tub Epi Few (A) NEG /HPF   EKG 12-lead, tracing only   Result Value Ref Range    Interpretation ECG Click View Image link to view waveform and result      ASSESSMENT/PLAN:                                                        Giovana was seen today for er f/u.    Diagnoses and all orders for this visit:    Pharyngeal dysphagia  -     omeprazole (PRILOSEC) 20 MG CR capsule; Take 1 capsule (20 mg) by mouth daily    Dyspepsia  -     omeprazole (PRILOSEC) 20 MG CR capsule; Take 1 capsule (20 mg) by mouth daily  Lifestyle modifications were discussed at length and in particular recommend that she stop her  night time habit of beer to calm down. She is also scheduled for EGD.        The following health maintenance items are reviewed in Epic and correct as of today:  Health Maintenance   Topic Date Due     PAP Q5 YEARS  11/07/1964     MIGRAINE ACTION PLAN,ONE TIME,NO INBASKET  11/07/1969     HEPATITIS C SCREENING  11/07/1969     HPV Q5 YEARS (Complete with PAP)  11/07/1981     FALL RISK ASSESSMENT  11/21/2017     MAMMO Q1 YR NO INBASKET MESSAGE  11/21/2017     PNEUMOCOCCAL (2 of 2 - PPSV23) 01/04/2018     ADVANCE DIRECTIVE PLANNING Q5 YRS (NO INBASKET)  07/31/2020     TETANUS IMMUNIZATION (SYSTEM ASSIGNED)  04/07/2021     LIPID SCREEN Q5 YR FEMALE (SYSTEM ASSIGNED)  01/05/2022     COLONOSCOPY Q10 YEARS NO INBASKET MESSAGE  01/17/2027     INFLUENZA VACCINE (SYSTEM ASSIGNED)  Completed     DEXA SCAN SCREENING (SYSTEM ASSIGNED)  Completed       Nidia Pierce MD  [facility]  Family Practice  [facility]  [phone]    For any issues my office # is [phone]

## 2017-03-09 ENCOUNTER — TRANSFERRED RECORDS (OUTPATIENT)
Dept: FAMILY MEDICINE | Facility: CLINIC | Age: 67
End: 2017-03-09

## 2017-03-30 ENCOUNTER — TRANSFERRED RECORDS (OUTPATIENT)
Dept: FAMILY MEDICINE | Facility: CLINIC | Age: 67
End: 2017-03-30

## 2017-04-05 ENCOUNTER — TRANSFERRED RECORDS (OUTPATIENT)
Dept: FAMILY MEDICINE | Facility: CLINIC | Age: 67
End: 2017-04-05

## 2017-05-01 DIAGNOSIS — R13.13 PHARYNGEAL DYSPHAGIA: ICD-10-CM

## 2017-05-01 DIAGNOSIS — R10.13 DYSPEPSIA: ICD-10-CM

## 2017-05-10 ENCOUNTER — OFFICE VISIT (OUTPATIENT)
Dept: FAMILY MEDICINE | Facility: CLINIC | Age: 67
End: 2017-05-10

## 2017-05-10 VITALS
BODY MASS INDEX: 28.88 KG/M2 | DIASTOLIC BLOOD PRESSURE: 60 MMHG | OXYGEN SATURATION: 96 % | TEMPERATURE: 98.1 F | HEIGHT: 63 IN | SYSTOLIC BLOOD PRESSURE: 114 MMHG | WEIGHT: 163 LBS | HEART RATE: 56 BPM

## 2017-05-10 DIAGNOSIS — R13.13 PHARYNGEAL DYSPHAGIA: ICD-10-CM

## 2017-05-10 DIAGNOSIS — Z11.59 NEED FOR HEPATITIS C SCREENING TEST: ICD-10-CM

## 2017-05-10 DIAGNOSIS — Z01.818 PREOPERATIVE EXAMINATION: Primary | ICD-10-CM

## 2017-05-10 LAB
% GRANULOCYTES: 63.3 % (ref 42.2–75.2)
HCT VFR BLD AUTO: 41.9 % (ref 35–46)
HEMOGLOBIN: 14.3 G/DL (ref 11.8–15.5)
LYMPHOCYTES NFR BLD AUTO: 29.6 % (ref 20.5–51.1)
MCH RBC QN AUTO: 33.2 PG (ref 27–31)
MCHC RBC AUTO-ENTMCNC: 34.1 G/DL (ref 33–37)
MCV RBC AUTO: 97.2 FL (ref 80–100)
MONOCYTES NFR BLD AUTO: 7.1 % (ref 1.7–9.3)
PLATELET # BLD AUTO: 230 K/UL (ref 140–450)
RBC # BLD AUTO: 4.3 X10/CMM (ref 3.7–5.2)
WBC # BLD AUTO: 5.8 X10/CMM (ref 3.8–11)

## 2017-05-10 PROCEDURE — 36415 COLL VENOUS BLD VENIPUNCTURE: CPT | Performed by: FAMILY MEDICINE

## 2017-05-10 PROCEDURE — 99215 OFFICE O/P EST HI 40 MIN: CPT | Performed by: FAMILY MEDICINE

## 2017-05-10 PROCEDURE — 85025 COMPLETE CBC W/AUTO DIFF WBC: CPT | Performed by: FAMILY MEDICINE

## 2017-05-10 PROCEDURE — 86803 HEPATITIS C AB TEST: CPT | Mod: 90 | Performed by: FAMILY MEDICINE

## 2017-05-10 NOTE — LETTER
[facility] [address] [place]  [address]  Phone: [phone]    May 12, 2017      Giovana Key  [address]  [facility] [address]              Dear Giovana,    The results from your recent visit showed No evidence of Hepatitis C.  Normal blood counts.        Sincerely,     Nidia Pierce M.D.    Results for orders placed or performed in visit on 05/10/17   CBC with Diff/Plt ([facility])   Result Value Ref Range    WBC x10/cmm 5.8 3.8 - 11.0 x10/cmm    % Lymphocytes 29.6 20.5 - 51.1 %    % Monocytes 7.1 1.7 - 9.3 %    % Granulocytes 63.3 42.2 - 75.2 %    RBC x10/cmm 4.30 3.7 - 5.2 x10/cmm    Hemoglobin 14.3 11.8 - 15.5 g/dl    Hematocrit 41.9 35 - 46 %    MCV 97.2 80 - 100 fL    MCH 33.2 (A) 27.0 - 31.0 pg    MCHC 34.1 33.0 - 37.0 g/dL    Platelet Count 230 140 - 450 K/uL   HCV Antibody ([facility])   Result Value Ref Range    Hep C Virus Ab <0.1 0.0 - 0.9 s/co ratio    Narrative    Performed at:  01 - [facility] [address], [place], [place]  [id number]  : Dallin Friedman MD, Phone:  [phone]

## 2017-05-10 NOTE — MR AVS SNAPSHOT
"              After Visit Summary   5/10/2017    Giovana Key    MRN: [id number]           Patient Information     Date Of Birth          1950        Visit Information        Provider Department      5/10/2017 10:30 AM Nidia Pierce MD [facility]        Today's Diagnoses     Preoperative examination    -  1    Pharyngeal dysphagia        Need for hepatitis C screening test           Follow-ups after your visit        Your next 10 appointments already scheduled     May 23, 2017   Procedure with Dot Vargas MD   [facility] ([facility])    [address]  [place] [address]   [phone]           [facility] is located at [address] [place]              Who to contact     If you have questions or need follow up information about today's clinic visit or your schedule please contact [facility] directly at [phone].  Normal or non-critical lab and imaging results will be communicated to you by MyChart, letter or phone within 4 business days after the clinic has received the results. If you do not hear from us within 7 days, please contact the clinic through [employer] or phone. If you have a critical or abnormal lab result, we will notify you by phone as soon as possible.  Submit refill requests through [employer] or call your pharmacy and they will forward the refill request to us. Please allow 3 business days for your refill to be completed.          Additional Information About Your Visit        MyChart Information     [employer] lets you send messages to your doctor, view your test results, renew your prescriptions, schedule appointments and more. To sign up, go to [email or web address] . Click on \"Log in\" on the left side of the screen, which will take you to the Welcome page. Then click on \"Sign up Now\" on the right side of the page.     You will be asked to enter the access code listed below, as well " "as some personal information. Please follow the directions to create your username and password.     Your access code is: [email or web address]  Expires: 2017 10:20 PM     Your access code will  in 90 days. If you need help or a new code, please call your [facility] clinic or [phone].        Care EveryWhere ID     This is your Care EveryWhere ID. This could be used by other organizations to access your [facility] medical records  [id number]        Your Vitals Were     Pulse Temperature Height Pulse Oximetry BMI (Body Mass Index)       56 98.1  F (36.7  C) 1.6 m (5' 3\") 96% 28.87 kg/m2        Blood Pressure from Last 3 Encounters:   05/10/17 114/60   17 128/72   17 133/62    Weight from Last 3 Encounters:   05/10/17 73.9 kg (163 lb)   17 74.8 kg (165 lb)   17 72.6 kg (160 lb)              We Performed the Following     CBC with Diff/Plt ([facility])     HCV Antibody ([facility])          Today's Medication Changes          These changes are accurate as of: 5/10/17 11:32 AM.  If you have any questions, ask your nurse or doctor.               Start taking these medicines.        Dose/Directions    ranitidine 150 MG tablet   Commonly known as:  ZANTAC   Used for:  Pharyngeal dysphagia   Started by:  Nidia Pierce MD        Dose:  150 mg   Take 1 tablet (150 mg) by mouth 2 times daily   Quantity:  60 tablet   Refills:  3            Where to get your medicines      These medications were sent to [facility] [id number] [place] [address] & [facility]  [address] [place] [address]     Phone:  [phone]     ranitidine 150 MG tablet                Primary Care Provider Office Phone # Fax #    Nidia Pierce [phone]       [facility] [address]  [facility] [address]        Thank you!     Thank you for choosing [facility]  for your care. Our goal is always to provide you with excellent care. " Hearing back from our patients is one way we can continue to improve our services. Please take a few minutes to complete the written survey that you may receive in the mail after your visit with us. Thank you!             Your Updated Medication List - Protect others around you: Learn how to safely use, store and throw away your medicines at www.disposemymeds.org.          This list is accurate as of: 5/10/17 11:32 AM.  Always use your most recent med list.                   Brand Name Dispense Instructions for use    BENEFIBER PO          BIOTIN 5000 PO      Take 1 chew tab by mouth daily       CALCIUM-VITAMIN D PO          COLACE PO      Take 50 mg by mouth       CYTOMEL PO      Take 2 tablets by mouth daily 1 day 1 tablet and the next day 2 tablets       * MULTIVITAMIN ADULT PO      Take 1 tablet by mouth daily       * ALIVE WOMENS GUMMY Chew      Take 1 chew tab by mouth daily       omeprazole 20 MG CR capsule    priLOSEC    30 capsule    Take 1 capsule (20 mg) by mouth daily       ranitidine 150 MG tablet    ZANTAC    60 tablet    Take 1 tablet (150 mg) by mouth 2 times daily       rizatriptan 10 MG tablet    MAXALT    18 tablet    Take 1 tablet (10 mg) by mouth at onset of headache for migraine May repeat dose in 2 hours.  Do not exceed 30 mg in 24 hours       SYNTHROID PO      Take 88 mcg by mouth daily       TESTOSTERONE IM      Inject  into the muscle See Admin Instructions.  q 2 weeks       UNABLE TO FIND      15 % daily MEDICATION NAME: progesterone cream Applies a 1/4 teaspoon daily       * Notice:  This list has 2 medication(s) that are the same as other medications prescribed for you. Read the directions carefully, and ask your doctor or other care provider to review them with you.

## 2017-05-10 NOTE — PROGRESS NOTES
[facility]  [address] [place] [id number] [phone]  Dept: [phone]    PRE-OP EVALUATION:  Today's date: 5/10/2017    Giovana Key (: 1950) presents for pre-operative evaluation assessment as requested by Dot Escobar. She requires evaluation and anesthesia risk assessment prior to undergoing surgery/procedure for treatment of ENDOSCOPIC ULTRASOUND, ESOPHAGOSCOPY, GASTROSCOPY, DUODENOSCOPY (EGD) (MAC SEDATION)  .  Proposed procedure: COMBINED ENDOSCOPIC ULTRASOUND, ESOPHAGOSCOPY, GASTROSCOPY, DUODENOSCOPY (EGD).    Date of Surgery/ Procedure: 17  Time of Surgery/ Procedure: 9:30AM  Hospital/Surgical Facility: [facility]  Fax number for surgical facility: [facility]  Primary Physician: Nidia Pierce  Type of Anesthesia Anticipated: to be determined    Patient has a Health Care Directive or Living Will:  YES     1. NO - Do you have a history of heart attack, stroke, stent, bypass or surgery on an artery in the head, neck, heart or legs?  2. NO - Do you ever have any pain or discomfort in your chest?  3. NO - Do you have a history of  Heart Failure?  4. NO - Are you troubled by shortness of breath when: walking on the level, up a slight hill or at night?  5. NO - Do you currently have a cold, bronchitis or other respiratory infection?  6. NO - Do you have a cough, shortness of breath or wheezing?  7. NO - Do you sometimes get pains in the calves of your legs when you walk?  8. NO - Do you or anyone in your family have previous history of blood clots?  9. NO - Do you or does anyone in your family have a serious bleeding problem such as prolonged bleeding following surgeries or cuts?  10. YES - HAVE YOU EVER HAD PROBLEMS WITH ANEMIA OR BEEN TOLD TO TAKE IRON PILLS? Years ago  11. NO - Have you had any abnormal blood loss such as black, tarry or bloody stools, or abnormal vaginal bleeding?  12. NO - Have you ever had a blood transfusion?  13. YES - HAVE  YOU OR ANY OF YOUR RELATIVES EVER HAD PROBLEMS WITH ANESTHESIA? Nausea.   14. NO - Do you have sleep apnea, excessive snoring or daytime drowsiness?  15. NO - Do you have any prosthetic heart valves?  16. NO - Do you have prosthetic joints?  17. NO - Is there any chance that you may be pregnant?      HPI:                                                      Brief HPI related to upcoming procedure: Giovana had complaint of dysphagia and EGD was done to evaluate. She had Schatzki's ring broken open and has had resolution of of dysphagia and chronic throat clearing. During the EGD, stomach nodule was identified. She is now scheduled for surgical removal of stomach nodule.       HYPOTHYROIDISM - Patient has a longstanding history of chronic Hypothyroidism. Patient has been doing well, noting no tremor, insomnia, hair loss or changes in skin texture. Last TSH value of 2.19. Continues to take medications as directed, without adverse reactions or side effects.                                                                                                                                                                                                                        .    MEDICAL HISTORY:                                                      Patient Active Problem List    Diagnosis Date Noted     Pharyngeal dysphagia 02/27/2017     Priority: Medium     Graves disease 03/14/2016     Priority: Medium     Diverticulitis of colon 02/29/2016     Priority: Medium     Advanced directives, counseling/discussion 07/31/2015     Priority: Medium     Advanced Care Planning 7/31/2015: Patient states they have received and completed their Advanced Care directive and they will bring in a copy for us to scan into their chart.  Jenn Taveras CMA  1:15 PM July 31, 2015           Health Care Home 09/03/2013     Priority: Medium     State Tier Level:  0       Pain in joint, ankle and foot 06/24/2013     Priority: Medium     Other  enthesopathy of ankle and tarsus 06/24/2013     Priority: Medium     Peroneal tendonitis 06/05/2013     Priority: Medium     heel pain 05/01/2013     Priority: Medium     Migraine 06/22/2012     Priority: Medium      Past Medical History:   Diagnosis Date     Decreased libido     sees Dr Caruso for testosterone injections causing clitoral megally, hirsutism and alopecia, pt still wants to continue the injections     Diverticulitis      Diverticulitis of colon 2/29/2016     GERD (gastroesophageal reflux disease)      Graves disease 3/14/2016     Thyroid disease      WPW syndrome      Past Surgical History:   Procedure Laterality Date     CHOLECYSTECTOMY       ENT SURGERY       HYSTERECTOMY VAGINAL  1997    Dr Grier     Current Outpatient Prescriptions   Medication Sig Dispense Refill     omeprazole (PRILOSEC) 20 MG CR capsule Take 1 capsule (20 mg) by mouth daily 30 capsule 1     CALCIUM-VITAMIN D PO        BIOTIN 5000 PO Take 1 chew tab by mouth daily       Multiple Vitamins-Minerals (ALIVE WOMENS GUMMY) CHEW Take 1 chew tab by mouth daily       rizatriptan (MAXALT) 10 MG tablet Take 1 tablet (10 mg) by mouth at onset of headache for migraine May repeat dose in 2 hours.  Do not exceed 30 mg in 24 hours 18 tablet 0     Multiple Vitamins-Minerals (MULTIVITAMIN ADULT PO) Take 1 tablet by mouth daily       Levothyroxine Sodium (SYNTHROID PO) Take 88 mcg by mouth daily       ALPRAZolam (XANAX) 0.5 MG tablet Take 0.5 mg by mouth once as needed Reported on 2/27/2017  0     UNABLE TO FIND 15 % daily MEDICATION NAME: progesterone cream  Applies a 1/4 teaspoon daily       Liothyronine Sodium (CYTOMEL PO) Take 2 tablets by mouth daily 1 day 1 tablet and the next day 2 tablets       TESTOSTERONE IM Inject  into the muscle See Admin Instructions.  q 2 weeks       OTC products: None, except as noted above    Allergies   Allergen Reactions     Imitrex [Sumatriptan] Cramps     Codeine      Augmentin Hives and Itching      Metronidazole Hives and Itching      Latex Allergy: NO    Social History   Substance Use Topics     Smoking status: Former Smoker     Types: Cigarettes     Quit date: 1/1/1983     Smokeless tobacco: Never Used     Alcohol use No     History   Drug Use Not on file       REVIEW OF SYSTEMS:                                                    C: NEGATIVE for fever, chills, change in weight  I: NEGATIVE for worrisome rashes, moles or lesions  E: NEGATIVE for vision changes or irritation  E/M: NEGATIVE for ear, mouth and throat problems  R: NEGATIVE for significant cough or SOB  B: NEGATIVE for masses, tenderness or discharge  CV: NEGATIVE for chest pain, palpitations or peripheral edema  GI: NEGATIVE for nausea, abdominal pain, heartburn, or change in bowel habits  : NEGATIVE for frequency, dysuria, or hematuria  M: NEGATIVE for significant arthralgias or myalgia  N: NEGATIVE for weakness, dizziness or paresthesias  E: NEGATIVE for temperature intolerance, skin/hair changes  H: NEGATIVE for bleeding problems  P: NEGATIVE for changes in mood or affect    EXAM:                                                    There were no vitals taken for this visit.    GENERAL APPEARANCE: healthy, alert and no distress     EYES: EOMI, PERRL     HENT: ear canals and TM's normal and nose and mouth without ulcers or lesions     NECK: no adenopathy, no asymmetry, masses, or scars and thyroid normal to palpation     RESP: lungs clear to auscultation - no rales, rhonchi or wheezes     CV: regular rates and rhythm, normal S1 S2, no S3 or S4 and no murmur, click or rub     ABDOMEN:  soft, nontender, no HSM or masses and bowel sounds normal     MS: extremities normal- no gross deformities noted, no evidence of inflammation in joints, FROM in all extremities.     SKIN: no suspicious lesions or rashes     NEURO: Normal strength and tone, sensory exam grossly normal, mentation intact and speech normal     PSYCH: mentation appears normal. and  affect normal/bright     LYMPHATICS: No axillary, cervical, or supraclavicular nodes    DIAGNOSTICS:                                                    No EKG required for low risk surgery (cataract, skin procedure, breast biopsy, etc) and normal EKG in February of 2017.    Recent Labs   Lab Test  02/18/17   1940  01/05/17   0849  10/31/16   1049   HGB  14.5   --   14.4   PLT  206   --   173   NA  144  144   --    POTASSIUM  3.3*  3.7   --    CR  0.85  0.90   --         IMPRESSION:                                                    Reason for surgery/procedure: Stomach nodule  Diagnosis/reason for consult: preoperative clearance    The proposed surgical procedure is considered LOW risk.    REVISED CARDIAC RISK INDEX  The patient has the following serious cardiovascular risks for perioperative complications such as (MI, PE, VFib and 3  AV Block):  No serious cardiac risks  INTERPRETATION: 0 risks: Class I (very low risk - 0.4% complication rate)    The patient has the following additional risks for perioperative complications:  No identified additional risks  The 10-year ASCVD risk score (Bing DC Jr, et al., 2013) is: 4.4%    Values used to calculate the score:      Age: 66 years      Sex: Female      Is Non- : No      Diabetic: No      Tobacco smoker: No      Systolic Blood Pressure: 114 mmHg      Is BP treated: No      HDL Cholesterol: 50 mg/dL      Total Cholesterol: 179 mg/dL    No diagnosis found.    RECOMMENDATIONS:                                                              APPROVAL GIVEN to proceed with proposed procedure, without further diagnostic evaluation       Signed Electronically by: Nidia Pierce MD    Copy of this evaluation report is provided to requesting physician.    [facility] Preop Guidelines

## 2017-05-12 LAB — HCV AB SERPL QL IA: <0.1 S/CO RATIO (ref 0–0.9)

## 2017-05-23 ENCOUNTER — ANESTHESIA EVENT (OUTPATIENT)
Dept: GASTROENTEROLOGY | Facility: CLINIC | Age: 67
End: 2017-05-23
Payer: MEDICARE

## 2017-05-23 ENCOUNTER — ANESTHESIA (OUTPATIENT)
Dept: GASTROENTEROLOGY | Facility: CLINIC | Age: 67
End: 2017-05-23
Payer: MEDICARE

## 2017-05-23 ENCOUNTER — SURGERY (OUTPATIENT)
Age: 67
End: 2017-05-23

## 2017-05-23 ENCOUNTER — HOSPITAL ENCOUNTER (OUTPATIENT)
Facility: CLINIC | Age: 67
Discharge: HOME | End: 2017-05-23
Attending: INTERNAL MEDICINE | Admitting: INTERNAL MEDICINE
Payer: MEDICARE

## 2017-05-23 VITALS
HEART RATE: 57 BPM | SYSTOLIC BLOOD PRESSURE: 114 MMHG | RESPIRATION RATE: 11 BRPM | OXYGEN SATURATION: 99 % | DIASTOLIC BLOOD PRESSURE: 65 MMHG

## 2017-05-23 LAB — UPPER EUS: NORMAL

## 2017-05-23 PROCEDURE — 40000010 ZZH STATISTIC ANES STAT CODE-CRNA PER MINUTE: Performed by: INTERNAL MEDICINE

## 2017-05-23 PROCEDURE — 43259 EGD US EXAM DUODENUM/JEJUNUM: CPT | Performed by: INTERNAL MEDICINE

## 2017-05-23 PROCEDURE — 25000128 H RX IP 250 OP 636: Performed by: NURSE ANESTHETIST, CERTIFIED REGISTERED

## 2017-05-23 PROCEDURE — 88305 TISSUE EXAM BY PATHOLOGIST: CPT | Performed by: INTERNAL MEDICINE

## 2017-05-23 PROCEDURE — 43239 EGD BIOPSY SINGLE/MULTIPLE: CPT | Performed by: INTERNAL MEDICINE

## 2017-05-23 PROCEDURE — 37000008 ZZH ANESTHESIA TECHNICAL FEE, 1ST 30 MIN: Performed by: INTERNAL MEDICINE

## 2017-05-23 PROCEDURE — 25000125 ZZHC RX 250: Performed by: NURSE ANESTHETIST, CERTIFIED REGISTERED

## 2017-05-23 PROCEDURE — 88305 TISSUE EXAM BY PATHOLOGIST: CPT | Mod: 26 | Performed by: INTERNAL MEDICINE

## 2017-05-23 PROCEDURE — 37000009 ZZH ANESTHESIA TECHNICAL FEE, EACH ADDTL 15 MIN: Performed by: INTERNAL MEDICINE

## 2017-05-23 PROCEDURE — 25000132 ZZH RX MED GY IP 250 OP 250 PS 637: Mod: GY | Performed by: NURSE ANESTHETIST, CERTIFIED REGISTERED

## 2017-05-23 RX ORDER — ONDANSETRON 4 MG/1
4 TABLET, ORALLY DISINTEGRATING ORAL EVERY 6 HOURS PRN
Status: CANCELLED | OUTPATIENT
Start: 2017-05-23

## 2017-05-23 RX ORDER — LIDOCAINE HYDROCHLORIDE 20 MG/ML
INJECTION, SOLUTION INFILTRATION; PERINEURAL PRN
Status: DISCONTINUED | OUTPATIENT
Start: 2017-05-23 | End: 2017-05-23

## 2017-05-23 RX ORDER — FLUMAZENIL 0.1 MG/ML
0.2 INJECTION, SOLUTION INTRAVENOUS
Status: CANCELLED | OUTPATIENT
Start: 2017-05-23 | End: 2017-05-23

## 2017-05-23 RX ORDER — PROPOFOL 10 MG/ML
INJECTION, EMULSION INTRAVENOUS PRN
Status: DISCONTINUED | OUTPATIENT
Start: 2017-05-23 | End: 2017-05-23

## 2017-05-23 RX ORDER — PROPOFOL 10 MG/ML
INJECTION, EMULSION INTRAVENOUS CONTINUOUS PRN
Status: DISCONTINUED | OUTPATIENT
Start: 2017-05-23 | End: 2017-05-23

## 2017-05-23 RX ORDER — ONDANSETRON 2 MG/ML
4 INJECTION INTRAMUSCULAR; INTRAVENOUS EVERY 6 HOURS PRN
Status: CANCELLED | OUTPATIENT
Start: 2017-05-23

## 2017-05-23 RX ORDER — LIDOCAINE 40 MG/G
CREAM TOPICAL
Status: DISCONTINUED | OUTPATIENT
Start: 2017-05-23 | End: 2017-05-23 | Stop reason: HOSPADM

## 2017-05-23 RX ORDER — ONDANSETRON 2 MG/ML
INJECTION INTRAMUSCULAR; INTRAVENOUS PRN
Status: DISCONTINUED | OUTPATIENT
Start: 2017-05-23 | End: 2017-05-23

## 2017-05-23 RX ORDER — FENTANYL CITRATE 50 UG/ML
INJECTION, SOLUTION INTRAMUSCULAR; INTRAVENOUS PRN
Status: DISCONTINUED | OUTPATIENT
Start: 2017-05-23 | End: 2017-05-23

## 2017-05-23 RX ORDER — SODIUM CHLORIDE, SODIUM LACTATE, POTASSIUM CHLORIDE, CALCIUM CHLORIDE 600; 310; 30; 20 MG/100ML; MG/100ML; MG/100ML; MG/100ML
INJECTION, SOLUTION INTRAVENOUS CONTINUOUS PRN
Status: DISCONTINUED | OUTPATIENT
Start: 2017-05-23 | End: 2017-05-23

## 2017-05-23 RX ORDER — NALOXONE HYDROCHLORIDE 0.4 MG/ML
.1-.4 INJECTION, SOLUTION INTRAMUSCULAR; INTRAVENOUS; SUBCUTANEOUS
Status: CANCELLED | OUTPATIENT
Start: 2017-05-23 | End: 2017-05-24

## 2017-05-23 RX ADMIN — ONDANSETRON 4 MG: 2 INJECTION INTRAMUSCULAR; INTRAVENOUS at 09:19

## 2017-05-23 RX ADMIN — BENZOCAINE 1 SPRAY: 220 SPRAY, METERED PERIODONTAL at 09:10

## 2017-05-23 RX ADMIN — LIDOCAINE HYDROCHLORIDE 50 MG: 20 INJECTION, SOLUTION INFILTRATION; PERINEURAL at 09:10

## 2017-05-23 RX ADMIN — FENTANYL CITRATE 25 MCG: 50 INJECTION, SOLUTION INTRAMUSCULAR; INTRAVENOUS at 09:10

## 2017-05-23 RX ADMIN — PROPOFOL 20 MG: 10 INJECTION, EMULSION INTRAVENOUS at 09:31

## 2017-05-23 RX ADMIN — MIDAZOLAM HYDROCHLORIDE 2 MG: 1 INJECTION, SOLUTION INTRAMUSCULAR; INTRAVENOUS at 09:10

## 2017-05-23 RX ADMIN — SODIUM CHLORIDE, POTASSIUM CHLORIDE, SODIUM LACTATE AND CALCIUM CHLORIDE: 600; 310; 30; 20 INJECTION, SOLUTION INTRAVENOUS at 09:02

## 2017-05-23 RX ADMIN — PROPOFOL 100 MCG/KG/MIN: 10 INJECTION, EMULSION INTRAVENOUS at 09:10

## 2017-05-23 RX ADMIN — FENTANYL CITRATE 25 MCG: 50 INJECTION, SOLUTION INTRAMUSCULAR; INTRAVENOUS at 09:31

## 2017-05-23 ASSESSMENT — ENCOUNTER SYMPTOMS
DYSRHYTHMIAS: 1
SEIZURES: 0

## 2017-05-23 NOTE — ANESTHESIA POSTPROCEDURE EVALUATION
Patient: Giovana Key    Procedure(s):  ENDOSCOPIC ULTRASOUND, ESOPHAGOSCOPY, GASTROSCOPY, DUODENOSCOPY (EGD) (MAC SEDATION)  - Wound Class: II-Clean Contaminated   - Wound Class: II-Clean Contaminated    Diagnosis:GASTRIC NODULE  Diagnosis Additional Information: No value filed.    Anesthesia Type:  MAC    Note:  Anesthesia Post Evaluation    Patient location during evaluation: Endoscopy Recovery  Patient participation: Able to fully participate in evaluation  Level of consciousness: awake  Pain management: adequate  Airway patency: patent  Cardiovascular status: acceptable  Respiratory status: acceptable  Hydration status: acceptable  PONV: none     Anesthetic complications: None          Last vitals:  Vitals:    05/23/17 1045 05/23/17 1050 05/23/17 1055   BP: 125/71 118/76 114/65   Pulse:      Resp: 20 19 11   SpO2: 99% 99% 99%         Electronically Signed By: Tristan Regalado MD  May 23, 2017  1:38 PM

## 2017-05-23 NOTE — ANESTHESIA PREPROCEDURE EVALUATION
Procedure: Procedure(s):  COMBINED ENDOSCOPIC ULTRASOUND, ESOPHAGOSCOPY, GASTROSCOPY, DUODENOSCOPY (EGD)  Preop diagnosis: GASTRIC NODULE  Allergies   Allergen Reactions     Imitrex [Sumatriptan] Cramps     Codeine      Augmentin Hives and Itching     Metronidazole Hives and Itching     Patient Active Problem List   Diagnosis     Migraine     heel pain     Peroneal tendonitis     Pain in joint, ankle and foot     Other enthesopathy of ankle and tarsus     Health Care Home     Advanced directives, counseling/discussion     Diverticulitis of colon     Graves disease     Pharyngeal dysphagia     Past Medical History:   Diagnosis Date     Decreased libido     sees Dr Caruso for testosterone injections causing clitoral megally, hirsutism and alopecia, pt still wants to continue the injections     Diverticulitis      Diverticulitis of colon 2/29/2016     GERD (gastroesophageal reflux disease)      Graves disease 3/14/2016     Thyroid disease      WPW syndrome      Past Surgical History:   Procedure Laterality Date     CHOLECYSTECTOMY       ENT SURGERY       HYSTERECTOMY VAGINAL  1997    Dr Grier       No current facility-administered medications on file prior to encounter.   Current Outpatient Prescriptions on File Prior to Encounter:  CALCIUM-VITAMIN D PO    BIOTIN 5000 PO Take 1 chew tab by mouth daily   Multiple Vitamins-Minerals (ALIVE WOMENS GUMMY) CHEW Take 1 chew tab by mouth daily   rizatriptan (MAXALT) 10 MG tablet Take 1 tablet (10 mg) by mouth at onset of headache for migraine May repeat dose in 2 hours.  Do not exceed 30 mg in 24 hours   Multiple Vitamins-Minerals (MULTIVITAMIN ADULT PO) Take 1 tablet by mouth daily   Levothyroxine Sodium (SYNTHROID PO) Take 88 mcg by mouth daily   Liothyronine Sodium (CYTOMEL PO) Take 2 tablets by mouth daily 1 day 1 tablet and the next day 2 tablets   UNABLE TO FIND 15 % daily MEDICATION NAME: progesterone creamApplies a 1/4 teaspoon daily   TESTOSTERONE IM Inject  into  the muscle See Admin Instructions.  q 2 weeks     /68  SpO2 90%    Lab Results   Component Value Date    WBC 5.8 05/10/2017    WBC 5.9 02/18/2017     Lab Results   Component Value Date    RBC 4.30 05/10/2017    RBC 4.23 02/18/2017     Lab Results   Component Value Date    HGB 14.3 05/10/2017     Lab Results   Component Value Date    HCT 41.9 05/10/2017     Lab Results   Component Value Date    MCV 97.2 05/10/2017     Lab Results   Component Value Date    MCH 33.2 05/10/2017     Lab Results   Component Value Date    MCHC 34.1 05/10/2017     Lab Results   Component Value Date    RDW 12.3 02/18/2017     Lab Results   Component Value Date     05/10/2017     No results found for: INR    Last Basic Metabolic Panel:  Lab Results   Component Value Date     02/18/2017      Lab Results   Component Value Date    POTASSIUM 3.3 02/18/2017     Lab Results   Component Value Date    CHLORIDE 109 02/18/2017     Lab Results   Component Value Date    VERA 8.6 02/18/2017     Lab Results   Component Value Date    CO2 26 02/18/2017     Lab Results   Component Value Date    BUN 13 02/18/2017     Lab Results   Component Value Date    CR 0.85 02/18/2017     Lab Results   Component Value Date    GLC 97 02/18/2017       EKG - SB, anterior infarct, nl axis    Echo 2015  Final Impressions:   1. Normal LV size, normal wall thickness, normal global systolic function with an estimated EF of 60 - 65%.    Chamber Sizes and Function  Normal left ventricular size, normal wall thickness, normal global systolic function with an estimated EF of 60 - 65%. Left atrial size is normal. Left atrial pressure is normal. Right ventricular cavity size is normal, global systolic RV function is normal. RV wall thickness is normal. The right atrium is normal. Right atrial area is 16 cm . The pulmonary artery is of normal size and origin. The sinus of Valsalva is normal sized. The ascending aorta is normal sized. The aortic arch is  normal.    Valves, RV Pressures and Diastolic Function    The aortic valve is normal in structure, no stenosis and no regurgitation. The mitral valve is normal in structure, trace mitral regurgitation. No mitral annular calcification seen. Normal diastolic function. The tricuspid valve is normal in structure. Tricuspid regurgitation is trace. The tricuspid regurgitant velocity is 2.7 m/s, the estimated right ventricular systolic pressure is 29 mmHg plus right atrial pressure. There is normal estimated pulmonary pressure by tricuspid regurgitation velocity and right atrial pressure. The pulmonic valve is normal. Trace pulmonic regurgitation is present on color flow.    Masses, Effusion, Shunts  There is no pericardial effusion. The inferior vena cava is normal sized, respiratory size variation greater than 50%. No left to right shunting was detected by limited color flow Doppler interrogation of the interatrial septum.  Anesthesia Evaluation     . Pt has had prior anesthetic.     History of anesthetic complications   - PONV        ROS/MED HX    ENT/Pulmonary:  - neg pulmonary ROS    (-) sleep apnea and recent URI   Neurologic:     (+)migraines,    (-) seizures and CVA   Cardiovascular: Comment: Hx of WPW with intermittent tachycardia, typically broken with valsalva.  NO meds, NO interventions, No ER visits    (+) ----. : . . . :. dysrhythmias WPW, .       METS/Exercise Tolerance:     Hematologic:  - neg hematologic  ROS       Musculoskeletal:         GI/Hepatic: Comment: Dysphagia  S/p dilation schatzki's ring  Stomach nodule    (+) GERD Other GI/Hepatic diverticulits      Renal/Genitourinary:         Endo:     (+) thyroid problem (hx of grave's disease) .      Psychiatric:  - neg psychiatric ROS       Infectious Disease:         Malignancy:         Other:                     Physical Exam  Normal systems: cardiovascular, pulmonary and dental    Airway   Mallampati: II  TM distance: >3 FB  Neck ROM: full    Dental      Cardiovascular   Rhythm and rate: regular and normal      Pulmonary    breath sounds clear to auscultation                    Anesthesia Plan      History & Physical Review  History and physical reviewed and following examination; no interval change.    ASA Status:  3 .    NPO Status:  > 8 hours    Plan for MAC Reason for MAC:  Deep or markedly invasive procedure (G8)  PONV prophylaxis:  Ondansetron (or other 5HT-3) and Dexamethasone or Solumedrol       Postoperative Care  Postoperative pain management:  IV analgesics.      Consents  Anesthetic plan, risks, benefits and alternatives discussed with:  Patient..                          .

## 2017-05-23 NOTE — ANESTHESIA CARE TRANSFER NOTE
Patient: Giovana Key    Procedure(s):  ENDOSCOPIC ULTRASOUND, ESOPHAGOSCOPY, GASTROSCOPY, DUODENOSCOPY (EGD) (MAC SEDATION)  - Wound Class: II-Clean Contaminated   - Wound Class: II-Clean Contaminated    Diagnosis: GASTRIC NODULE  Diagnosis Additional Information: No value filed.    Anesthesia Type:   MAC     Note:  Airway :Nasal Cannula  Patient transferred to:Phase II  Comments: Transferred to PACU, spontaneous respirations. Oxygen via nasal cannula at 2 LPM to PACU, connected to wall O2 in PACU. All monitors and alarms on and functioning, clinically stable vital signs. Report given to PACU RN and questions answered.       Vitals: (Last set prior to Anesthesia Care Transfer)    CRNA VITALS  5/23/2017 0913 - 5/23/2017 0947      5/23/2017             Pulse: 71    Ht Rate: 68    SpO2: 96 %    Resp Rate (set): 10                Electronically Signed By: AIMEE Barahona CRNA  May 23, 2017  9:47 AM

## 2017-05-24 LAB — COPATH REPORT: NORMAL

## 2017-05-31 NOTE — H&P (VIEW-ONLY)
[facility]  [address] [place] [id number] [phone]  Dept: [phone]    PRE-OP EVALUATION:  Today's date: 5/10/2017    Giovana Key (: 1950) presents for pre-operative evaluation assessment as requested by Dot Escobar. She requires evaluation and anesthesia risk assessment prior to undergoing surgery/procedure for treatment of ENDOSCOPIC ULTRASOUND, ESOPHAGOSCOPY, GASTROSCOPY, DUODENOSCOPY (EGD) (MAC SEDATION)  .  Proposed procedure: COMBINED ENDOSCOPIC ULTRASOUND, ESOPHAGOSCOPY, GASTROSCOPY, DUODENOSCOPY (EGD).    Date of Surgery/ Procedure: 17  Time of Surgery/ Procedure: 9:30AM  Hospital/Surgical Facility: [facility]  Fax number for surgical facility: [facility]  Primary Physician: Nidia Pierce  Type of Anesthesia Anticipated: to be determined    Patient has a Health Care Directive or Living Will:  YES     1. NO - Do you have a history of heart attack, stroke, stent, bypass or surgery on an artery in the head, neck, heart or legs?  2. NO - Do you ever have any pain or discomfort in your chest?  3. NO - Do you have a history of  Heart Failure?  4. NO - Are you troubled by shortness of breath when: walking on the level, up a slight hill or at night?  5. NO - Do you currently have a cold, bronchitis or other respiratory infection?  6. NO - Do you have a cough, shortness of breath or wheezing?  7. NO - Do you sometimes get pains in the calves of your legs when you walk?  8. NO - Do you or anyone in your family have previous history of blood clots?  9. NO - Do you or does anyone in your family have a serious bleeding problem such as prolonged bleeding following surgeries or cuts?  10. YES - HAVE YOU EVER HAD PROBLEMS WITH ANEMIA OR BEEN TOLD TO TAKE IRON PILLS? Years ago  11. NO - Have you had any abnormal blood loss such as black, tarry or bloody stools, or abnormal vaginal bleeding?  12. NO - Have you ever had a blood transfusion?  13. YES - HAVE  YOU OR ANY OF YOUR RELATIVES EVER HAD PROBLEMS WITH ANESTHESIA? Nausea.   14. NO - Do you have sleep apnea, excessive snoring or daytime drowsiness?  15. NO - Do you have any prosthetic heart valves?  16. NO - Do you have prosthetic joints?  17. NO - Is there any chance that you may be pregnant?      HPI:                                                      Brief HPI related to upcoming procedure: Giovana had complaint of dysphagia and EGD was done to evaluate. She had Schatzki's ring broken open and has had resolution of of dysphagia and chronic throat clearing. During the EGD, stomach nodule was identified. She is now scheduled for surgical removal of stomach nodule.       HYPOTHYROIDISM - Patient has a longstanding history of chronic Hypothyroidism. Patient has been doing well, noting no tremor, insomnia, hair loss or changes in skin texture. Last TSH value of 2.19. Continues to take medications as directed, without adverse reactions or side effects.                                                                                                                                                                                                                        .    MEDICAL HISTORY:                                                      Patient Active Problem List    Diagnosis Date Noted     Pharyngeal dysphagia 02/27/2017     Priority: Medium     Graves disease 03/14/2016     Priority: Medium     Diverticulitis of colon 02/29/2016     Priority: Medium     Advanced directives, counseling/discussion 07/31/2015     Priority: Medium     Advanced Care Planning 7/31/2015: Patient states they have received and completed their Advanced Care directive and they will bring in a copy for us to scan into their chart.  Jenn Taveras CMA  1:15 PM July 31, 2015           Health Care Home 09/03/2013     Priority: Medium     State Tier Level:  0       Pain in joint, ankle and foot 06/24/2013     Priority: Medium     Other  enthesopathy of ankle and tarsus 06/24/2013     Priority: Medium     Peroneal tendonitis 06/05/2013     Priority: Medium     heel pain 05/01/2013     Priority: Medium     Migraine 06/22/2012     Priority: Medium      Past Medical History:   Diagnosis Date     Decreased libido     sees Dr Caruso for testosterone injections causing clitoral megally, hirsutism and alopecia, pt still wants to continue the injections     Diverticulitis      Diverticulitis of colon 2/29/2016     GERD (gastroesophageal reflux disease)      Graves disease 3/14/2016     Thyroid disease      WPW syndrome      Past Surgical History:   Procedure Laterality Date     CHOLECYSTECTOMY       ENT SURGERY       HYSTERECTOMY VAGINAL  1997    Dr Grier     Current Outpatient Prescriptions   Medication Sig Dispense Refill     omeprazole (PRILOSEC) 20 MG CR capsule Take 1 capsule (20 mg) by mouth daily 30 capsule 1     CALCIUM-VITAMIN D PO        BIOTIN 5000 PO Take 1 chew tab by mouth daily       Multiple Vitamins-Minerals (ALIVE WOMENS GUMMY) CHEW Take 1 chew tab by mouth daily       rizatriptan (MAXALT) 10 MG tablet Take 1 tablet (10 mg) by mouth at onset of headache for migraine May repeat dose in 2 hours.  Do not exceed 30 mg in 24 hours 18 tablet 0     Multiple Vitamins-Minerals (MULTIVITAMIN ADULT PO) Take 1 tablet by mouth daily       Levothyroxine Sodium (SYNTHROID PO) Take 88 mcg by mouth daily       ALPRAZolam (XANAX) 0.5 MG tablet Take 0.5 mg by mouth once as needed Reported on 2/27/2017  0     UNABLE TO FIND 15 % daily MEDICATION NAME: progesterone cream  Applies a 1/4 teaspoon daily       Liothyronine Sodium (CYTOMEL PO) Take 2 tablets by mouth daily 1 day 1 tablet and the next day 2 tablets       TESTOSTERONE IM Inject  into the muscle See Admin Instructions.  q 2 weeks       OTC products: None, except as noted above    Allergies   Allergen Reactions     Imitrex [Sumatriptan] Cramps     Codeine      Augmentin Hives and Itching      Metronidazole Hives and Itching      Latex Allergy: NO    Social History   Substance Use Topics     Smoking status: Former Smoker     Types: Cigarettes     Quit date: 1/1/1983     Smokeless tobacco: Never Used     Alcohol use No     History   Drug Use Not on file       REVIEW OF SYSTEMS:                                                    C: NEGATIVE for fever, chills, change in weight  I: NEGATIVE for worrisome rashes, moles or lesions  E: NEGATIVE for vision changes or irritation  E/M: NEGATIVE for ear, mouth and throat problems  R: NEGATIVE for significant cough or SOB  B: NEGATIVE for masses, tenderness or discharge  CV: NEGATIVE for chest pain, palpitations or peripheral edema  GI: NEGATIVE for nausea, abdominal pain, heartburn, or change in bowel habits  : NEGATIVE for frequency, dysuria, or hematuria  M: NEGATIVE for significant arthralgias or myalgia  N: NEGATIVE for weakness, dizziness or paresthesias  E: NEGATIVE for temperature intolerance, skin/hair changes  H: NEGATIVE for bleeding problems  P: NEGATIVE for changes in mood or affect    EXAM:                                                    There were no vitals taken for this visit.    GENERAL APPEARANCE: healthy, alert and no distress     EYES: EOMI, PERRL     HENT: ear canals and TM's normal and nose and mouth without ulcers or lesions     NECK: no adenopathy, no asymmetry, masses, or scars and thyroid normal to palpation     RESP: lungs clear to auscultation - no rales, rhonchi or wheezes     CV: regular rates and rhythm, normal S1 S2, no S3 or S4 and no murmur, click or rub     ABDOMEN:  soft, nontender, no HSM or masses and bowel sounds normal     MS: extremities normal- no gross deformities noted, no evidence of inflammation in joints, FROM in all extremities.     SKIN: no suspicious lesions or rashes     NEURO: Normal strength and tone, sensory exam grossly normal, mentation intact and speech normal     PSYCH: mentation appears normal. and  affect normal/bright     LYMPHATICS: No axillary, cervical, or supraclavicular nodes    DIAGNOSTICS:                                                    No EKG required for low risk surgery (cataract, skin procedure, breast biopsy, etc) and normal EKG in February of 2017.    Recent Labs   Lab Test  02/18/17   1940  01/05/17   0849  10/31/16   1049   HGB  14.5   --   14.4   PLT  206   --   173   NA  144  144   --    POTASSIUM  3.3*  3.7   --    CR  0.85  0.90   --         IMPRESSION:                                                    Reason for surgery/procedure: Stomach nodule  Diagnosis/reason for consult: preoperative clearance    The proposed surgical procedure is considered LOW risk.    REVISED CARDIAC RISK INDEX  The patient has the following serious cardiovascular risks for perioperative complications such as (MI, PE, VFib and 3  AV Block):  No serious cardiac risks  INTERPRETATION: 0 risks: Class I (very low risk - 0.4% complication rate)    The patient has the following additional risks for perioperative complications:  No identified additional risks  The 10-year ASCVD risk score (Bing DC Jr, et al., 2013) is: 4.4%    Values used to calculate the score:      Age: 66 years      Sex: Female      Is Non- : No      Diabetic: No      Tobacco smoker: No      Systolic Blood Pressure: 114 mmHg      Is BP treated: No      HDL Cholesterol: 50 mg/dL      Total Cholesterol: 179 mg/dL    No diagnosis found.    RECOMMENDATIONS:                                                              APPROVAL GIVEN to proceed with proposed procedure, without further diagnostic evaluation       Signed Electronically by: Nidia Pierce MD    Copy of this evaluation report is provided to requesting physician.    [facility] Preop Guidelines

## 2017-06-02 ENCOUNTER — HOSPITAL ENCOUNTER (OUTPATIENT)
Facility: CLINIC | Age: 67
Discharge: HOME | End: 2017-06-02
Attending: INTERNAL MEDICINE | Admitting: INTERNAL MEDICINE
Payer: MEDICARE

## 2017-06-02 ENCOUNTER — APPOINTMENT (OUTPATIENT)
Dept: GENERAL RADIOLOGY | Facility: CLINIC | Age: 67
End: 2017-06-02
Attending: INTERNAL MEDICINE
Payer: MEDICARE

## 2017-06-02 ENCOUNTER — ANESTHESIA EVENT (OUTPATIENT)
Dept: SURGERY | Facility: CLINIC | Age: 67
End: 2017-06-02
Payer: MEDICARE

## 2017-06-02 ENCOUNTER — ANESTHESIA (OUTPATIENT)
Dept: SURGERY | Facility: CLINIC | Age: 67
End: 2017-06-02
Payer: MEDICARE

## 2017-06-02 VITALS
HEIGHT: 63 IN | OXYGEN SATURATION: 97 % | TEMPERATURE: 98.2 F | SYSTOLIC BLOOD PRESSURE: 147 MMHG | BODY MASS INDEX: 29.02 KG/M2 | RESPIRATION RATE: 16 BRPM | DIASTOLIC BLOOD PRESSURE: 76 MMHG | WEIGHT: 163.8 LBS

## 2017-06-02 LAB — ERCP: NORMAL

## 2017-06-02 PROCEDURE — 27210286 ZZH BALLOON ADDITIONAL: Performed by: INTERNAL MEDICINE

## 2017-06-02 PROCEDURE — 25000125 ZZHC RX 250: Performed by: ANESTHESIOLOGY

## 2017-06-02 PROCEDURE — 36000056 ZZH SURGERY LEVEL 3 1ST 30 MIN: Performed by: INTERNAL MEDICINE

## 2017-06-02 PROCEDURE — 37000009 ZZH ANESTHESIA TECHNICAL FEE, EACH ADDTL 15 MIN: Performed by: INTERNAL MEDICINE

## 2017-06-02 PROCEDURE — 25000128 H RX IP 250 OP 636: Performed by: INTERNAL MEDICINE

## 2017-06-02 PROCEDURE — 71000013 ZZH RECOVERY PHASE 1 LEVEL 1 EA ADDTL HR: Performed by: INTERNAL MEDICINE

## 2017-06-02 PROCEDURE — C1887 CATHETER, GUIDING: HCPCS | Performed by: INTERNAL MEDICINE

## 2017-06-02 PROCEDURE — 37000008 ZZH ANESTHESIA TECHNICAL FEE, 1ST 30 MIN: Performed by: INTERNAL MEDICINE

## 2017-06-02 PROCEDURE — A9270 NON-COVERED ITEM OR SERVICE: HCPCS | Mod: GY | Performed by: INTERNAL MEDICINE

## 2017-06-02 PROCEDURE — 40000170 ZZH STATISTIC PRE-PROCEDURE ASSESSMENT II: Performed by: INTERNAL MEDICINE

## 2017-06-02 PROCEDURE — 25000128 H RX IP 250 OP 636: Performed by: NURSE ANESTHETIST, CERTIFIED REGISTERED

## 2017-06-02 PROCEDURE — 36000058 ZZH SURGERY LEVEL 3 EA 15 ADDTL MIN: Performed by: INTERNAL MEDICINE

## 2017-06-02 PROCEDURE — 93010 ELECTROCARDIOGRAM REPORT: CPT | Performed by: INTERNAL MEDICINE

## 2017-06-02 PROCEDURE — 25000125 ZZHC RX 250: Performed by: NURSE ANESTHETIST, CERTIFIED REGISTERED

## 2017-06-02 PROCEDURE — 40000065 ZZH STATISTIC EKG NON-CHARGEABLE

## 2017-06-02 PROCEDURE — 71000012 ZZH RECOVERY PHASE 1 LEVEL 1 FIRST HR: Performed by: INTERNAL MEDICINE

## 2017-06-02 PROCEDURE — 71000027 ZZH RECOVERY PHASE 2 EACH 15 MINS: Performed by: INTERNAL MEDICINE

## 2017-06-02 PROCEDURE — 93005 ELECTROCARDIOGRAM TRACING: CPT

## 2017-06-02 PROCEDURE — 40000277 XR SURGERY CARM FLUORO LESS THAN 5 MIN W STILLS

## 2017-06-02 PROCEDURE — C1769 GUIDE WIRE: HCPCS | Performed by: INTERNAL MEDICINE

## 2017-06-02 PROCEDURE — 25000132 ZZH RX MED GY IP 250 OP 250 PS 637: Mod: GY | Performed by: INTERNAL MEDICINE

## 2017-06-02 PROCEDURE — 25000128 H RX IP 250 OP 636: Performed by: ANESTHESIOLOGY

## 2017-06-02 RX ORDER — FLUMAZENIL 0.1 MG/ML
0.2 INJECTION, SOLUTION INTRAVENOUS
Status: DISCONTINUED | OUTPATIENT
Start: 2017-06-02 | End: 2017-06-02 | Stop reason: HOSPADM

## 2017-06-02 RX ORDER — MEPERIDINE HYDROCHLORIDE 25 MG/ML
12.5 INJECTION INTRAMUSCULAR; INTRAVENOUS; SUBCUTANEOUS
Status: DISCONTINUED | OUTPATIENT
Start: 2017-06-02 | End: 2017-06-02 | Stop reason: HOSPADM

## 2017-06-02 RX ORDER — SODIUM CHLORIDE, SODIUM LACTATE, POTASSIUM CHLORIDE, CALCIUM CHLORIDE 600; 310; 30; 20 MG/100ML; MG/100ML; MG/100ML; MG/100ML
INJECTION, SOLUTION INTRAVENOUS CONTINUOUS PRN
Status: DISCONTINUED | OUTPATIENT
Start: 2017-06-02 | End: 2017-06-02

## 2017-06-02 RX ORDER — PROPOFOL 10 MG/ML
INJECTION, EMULSION INTRAVENOUS PRN
Status: DISCONTINUED | OUTPATIENT
Start: 2017-06-02 | End: 2017-06-02

## 2017-06-02 RX ORDER — ONDANSETRON 4 MG/1
4 TABLET, ORALLY DISINTEGRATING ORAL EVERY 30 MIN PRN
Status: DISCONTINUED | OUTPATIENT
Start: 2017-06-02 | End: 2017-06-02 | Stop reason: HOSPADM

## 2017-06-02 RX ORDER — ONDANSETRON 2 MG/ML
4 INJECTION INTRAMUSCULAR; INTRAVENOUS EVERY 30 MIN PRN
Status: DISCONTINUED | OUTPATIENT
Start: 2017-06-02 | End: 2017-06-02 | Stop reason: HOSPADM

## 2017-06-02 RX ORDER — ONDANSETRON 2 MG/ML
INJECTION INTRAMUSCULAR; INTRAVENOUS PRN
Status: DISCONTINUED | OUTPATIENT
Start: 2017-06-02 | End: 2017-06-02

## 2017-06-02 RX ORDER — SODIUM CHLORIDE, SODIUM LACTATE, POTASSIUM CHLORIDE, CALCIUM CHLORIDE 600; 310; 30; 20 MG/100ML; MG/100ML; MG/100ML; MG/100ML
INJECTION, SOLUTION INTRAVENOUS CONTINUOUS
Status: DISCONTINUED | OUTPATIENT
Start: 2017-06-02 | End: 2017-06-02 | Stop reason: HOSPADM

## 2017-06-02 RX ORDER — DEXAMETHASONE SODIUM PHOSPHATE 4 MG/ML
INJECTION, SOLUTION INTRA-ARTICULAR; INTRALESIONAL; INTRAMUSCULAR; INTRAVENOUS; SOFT TISSUE PRN
Status: DISCONTINUED | OUTPATIENT
Start: 2017-06-02 | End: 2017-06-02

## 2017-06-02 RX ORDER — NALOXONE HYDROCHLORIDE 0.4 MG/ML
.1-.4 INJECTION, SOLUTION INTRAMUSCULAR; INTRAVENOUS; SUBCUTANEOUS
Status: DISCONTINUED | OUTPATIENT
Start: 2017-06-02 | End: 2017-06-02 | Stop reason: HOSPADM

## 2017-06-02 RX ORDER — GLYCOPYRROLATE 0.2 MG/ML
INJECTION, SOLUTION INTRAMUSCULAR; INTRAVENOUS PRN
Status: DISCONTINUED | OUTPATIENT
Start: 2017-06-02 | End: 2017-06-02

## 2017-06-02 RX ORDER — INDOMETHACIN 50 MG/1
100 SUPPOSITORY RECTAL
Status: DISCONTINUED | OUTPATIENT
Start: 2017-06-02 | End: 2017-06-02 | Stop reason: HOSPADM

## 2017-06-02 RX ORDER — ONDANSETRON 4 MG/1
4 TABLET, ORALLY DISINTEGRATING ORAL EVERY 6 HOURS PRN
Status: DISCONTINUED | OUTPATIENT
Start: 2017-06-02 | End: 2017-06-02 | Stop reason: HOSPADM

## 2017-06-02 RX ORDER — FENTANYL CITRATE 50 UG/ML
25-50 INJECTION, SOLUTION INTRAMUSCULAR; INTRAVENOUS
Status: DISCONTINUED | OUTPATIENT
Start: 2017-06-02 | End: 2017-06-02 | Stop reason: HOSPADM

## 2017-06-02 RX ORDER — OXYCODONE AND ACETAMINOPHEN 5; 325 MG/1; MG/1
1 TABLET ORAL ONCE
Status: COMPLETED | OUTPATIENT
Start: 2017-06-02 | End: 2017-06-02

## 2017-06-02 RX ORDER — LIDOCAINE 40 MG/G
CREAM TOPICAL
Status: DISCONTINUED | OUTPATIENT
Start: 2017-06-02 | End: 2017-06-02 | Stop reason: HOSPADM

## 2017-06-02 RX ORDER — FENTANYL CITRATE 50 UG/ML
INJECTION, SOLUTION INTRAMUSCULAR; INTRAVENOUS PRN
Status: DISCONTINUED | OUTPATIENT
Start: 2017-06-02 | End: 2017-06-02

## 2017-06-02 RX ORDER — LIDOCAINE HYDROCHLORIDE 20 MG/ML
INJECTION, SOLUTION INFILTRATION; PERINEURAL PRN
Status: DISCONTINUED | OUTPATIENT
Start: 2017-06-02 | End: 2017-06-02

## 2017-06-02 RX ADMIN — MIDAZOLAM HYDROCHLORIDE 2 MG: 1 INJECTION, SOLUTION INTRAMUSCULAR; INTRAVENOUS at 13:11

## 2017-06-02 RX ADMIN — LIDOCAINE HYDROCHLORIDE 100 MG: 20 INJECTION, SOLUTION INFILTRATION; PERINEURAL at 13:11

## 2017-06-02 RX ADMIN — FENTANYL CITRATE 100 MCG: 50 INJECTION, SOLUTION INTRAMUSCULAR; INTRAVENOUS at 13:11

## 2017-06-02 RX ADMIN — DEXMEDETOMIDINE HYDROCHLORIDE 30 MCG: 100 INJECTION, SOLUTION INTRAVENOUS at 13:15

## 2017-06-02 RX ADMIN — OXYCODONE HYDROCHLORIDE AND ACETAMINOPHEN 1 TABLET: 5; 325 TABLET ORAL at 16:23

## 2017-06-02 RX ADMIN — ONDANSETRON 4 MG: 4 TABLET, ORALLY DISINTEGRATING ORAL at 17:34

## 2017-06-02 RX ADMIN — ONDANSETRON 4 MG: 2 INJECTION INTRAMUSCULAR; INTRAVENOUS at 13:27

## 2017-06-02 RX ADMIN — DEXAMETHASONE SODIUM PHOSPHATE 4 MG: 4 INJECTION, SOLUTION INTRA-ARTICULAR; INTRALESIONAL; INTRAMUSCULAR; INTRAVENOUS; SOFT TISSUE at 13:27

## 2017-06-02 RX ADMIN — GLYCOPYRROLATE 0.4 MG: 0.2 INJECTION, SOLUTION INTRAMUSCULAR; INTRAVENOUS at 13:23

## 2017-06-02 RX ADMIN — SODIUM CHLORIDE, POTASSIUM CHLORIDE, SODIUM LACTATE AND CALCIUM CHLORIDE: 600; 310; 30; 20 INJECTION, SOLUTION INTRAVENOUS at 13:04

## 2017-06-02 RX ADMIN — ONDANSETRON 4 MG: 2 SOLUTION INTRAMUSCULAR; INTRAVENOUS at 14:05

## 2017-06-02 RX ADMIN — PROPOFOL 50 MG: 10 INJECTION, EMULSION INTRAVENOUS at 13:20

## 2017-06-02 RX ADMIN — FENTANYL CITRATE 50 MCG: 50 INJECTION, SOLUTION INTRAMUSCULAR; INTRAVENOUS at 14:42

## 2017-06-02 RX ADMIN — SODIUM CHLORIDE, POTASSIUM CHLORIDE, SODIUM LACTATE AND CALCIUM CHLORIDE: 600; 310; 30; 20 INJECTION, SOLUTION INTRAVENOUS at 16:40

## 2017-06-02 RX ADMIN — DEXMEDETOMIDINE HYDROCHLORIDE 0.4 MCG/KG/HR: 100 INJECTION, SOLUTION INTRAVENOUS at 13:16

## 2017-06-02 ASSESSMENT — LIFESTYLE VARIABLES: TOBACCO_USE: 0

## 2017-06-02 NOTE — OR NURSING
"Patient experiencing pain under breasts and around back. She rate pain a \"4\". Dr. Barnett notified and assessed patient at bedside-1 liter LR fluid bolus ordered and prescription for percocet. He wanted patient on clear liquids tonight. Resume regular diet tomorrow. May take ibuprofen. Go to ER if pain increases when at home.   "

## 2017-06-02 NOTE — ANESTHESIA CARE TRANSFER NOTE
Patient: Giovana Key    Procedure(s):  ENDOSCOPIC RETROGRADE CHOLANGIOPANCREATOGRAM (ERCP) STONE REMOVAL, SPINCHTEROTOMY - Wound Class: II-Clean Contaminated    Diagnosis: GALLSTONES  Diagnosis Additional Information: No value filed.    Anesthesia Type:   MAC     Note:  Airway :Nasal Cannula  Patient transferred to:PACU  Comments: A&O X 3.  DENIES PAIN.   REPORT TO RN.      Vitals: (Last set prior to Anesthesia Care Transfer)    CRNA VITALS  6/2/2017 1312 - 6/2/2017 1351      6/2/2017             Pulse: 69    SpO2: 97 %    Resp Rate (set): 10                Electronically Signed By: Layne Poole  June 2, 2017  1:51 PM

## 2017-06-02 NOTE — IP AVS SNAPSHOT
MRN:[id number]                      After Visit Summary   6/2/2017    Giovnaa Key    MRN: [id number]           Thank you!     Thank you for choosing [facility] for your care. Our goal is always to provide you with excellent care. Hearing back from our patients is one way we can continue to improve our services. Please take a few minutes to complete the written survey that you may receive in the mail after you visit with us. Thank you!        Patient Information     Date Of Birth          1950        About your hospital stay     You were admitted on:  June 2, 2017 You last received care in the:  [facility] PACU    You were discharged on:  June 2, 2017       Who to Call     For medical emergencies, please call 911.  For non-urgent questions about your medical care, please call your primary care provider or clinic, [phone]  For questions related to your surgery, please call your surgery clinic        Attending Provider     Provider Specialty    Preston Barnett MD Gastroenterology       Primary Care Provider Office Phone # Fax #    Nidia Zuri Pierce [phone]      After Care Instructions     Discharge Instructions       Resume pre procedure diet            Discharge Instructions       Restart home medications.                  Further instructions from your care team       Same Day Surgery Discharge Instructions for  Sedation and General Anesthesia       It's not unusual to feel dizzy, light-headed or faint for up to 24 hours after surgery or while taking pain medication.  If you have these symptoms: sit for a few minutes before standing and have someone assist you when you get up to walk or use the bathroom.      You should rest and relax for the next 24 hours. We recommend you make arrangements to have an adult stay with you for at least 24 hours after your discharge.  Avoid hazardous and strenuous activity.      DO NOT DRIVE any vehicle or operate  mechanical equipment for 24 hours following the end of your surgery.  Even though you may feel normal, your reactions may be affected by the medication you have received.      Do not drink alcoholic beverages for 24 hours following surgery.       Slowly progress to your regular diet as you feel able. It's not unusual to feel nauseated and/or vomit after receiving anesthesia.  If you develop these symptoms, drink clear liquids (apple juice, ginger ale, broth, 7-up, etc. ) until you feel better.  If your nausea and vomiting persists for 24 hours, please notify your surgeon.        All narcotic pain medications, along with inactivity and anesthesia, can cause constipation. Drinking plenty of liquids and increasing fiber intake will help.      For any questions of a medical nature, call your surgeon.      Do not make important decisions for 24 hours.      If you had general anesthesia, you may have a sore throat for a couple of days related to the breathing tube used during surgery.  You may use Cepacol lozenges to help with this discomfort.  If it worsens or if you develop a fever, contact your surgeon.       If you feel your pain is not well managed with the pain medications prescribed by your surgeon, please contact your surgeon's office to let them know so they can address your concerns.       Dr. Barnett  [phone]    1. Clear liquid diet tonight  2. Resume regular diet tomorrow  3. May take ibuprofen for pain  4. If pain worsens go to the emergency department.       Percocet one tablet given at  4:23pm         Pending Results     Date and Time Order Name Status Description    6/2/2017 1223 EKG 12-lead, tracing only Preliminary             Admission Information     Date & Time Provider Department Dept. Phone    6/2/2017 Preston Barnett MD [facility] PACU [phone]      Your Vitals Were     Blood Pressure Temperature Respirations Height Weight Pulse Oximetry    134/69 98.3  F (36.8  C)  "(Temporal) 14 1.6 m (5' 3\") 74.3 kg (163 lb 12.8 oz) 94%    BMI (Body Mass Index)                   29.02 kg/m2           [employer] Information     [employer] lets you send messages to your doctor, view your test results, renew your prescriptions, schedule appointments and more. To sign up, go to [email or web address] . Click on \"Log in\" on the left side of the screen, which will take you to the Welcome page. Then click on \"Sign up Now\" on the right side of the page.     You will be asked to enter the access code listed below, as well as some personal information. Please follow the directions to create your username and password.     Your access code is: [email or web address]  Expires: 2017  2:11 PM     Your access code will  in 90 days. If you need help or a new code, please call your [facility] clinic or [phone].        Care EveryWhere ID     This is your Care EveryWhere ID. This could be used by other organizations to access your [facility] medical records  [id number]           Review of your medicines      CONTINUE these medicines which have NOT CHANGED        Dose / Directions    ALIVE WOMENS GUMMY Chew        Dose:  1 chew tab   Take 1 chew tab by mouth daily   Refills:  0       BENEFIBER PO        Take by mouth daily as needed   Refills:  0       BIOTIN 5000 PO        Dose:  1 chew tab   Take 1 chew tab by mouth daily   Refills:  0       CALCIUM-VITAMIN D PO        Dose:  1 tablet   Take 1 tablet by mouth 2 times daily   Refills:  0       COLACE PO        Dose:  50 mg   Take 50 mg by mouth as needed   Refills:  0       CYTOMEL PO        Dose:  2 tablet   Take 2 tablets by mouth daily 1 day 1 tablet and the next day 2 tablets   Refills:  0       omeprazole 20 MG CR capsule   Commonly known as:  priLOSEC   Used for:  Dyspepsia, Pharyngeal dysphagia        Dose:  20 mg   Take 1 capsule (20 mg) by mouth daily   Quantity:  30 capsule   Refills:  1       rizatriptan 10 MG tablet   Commonly known as:  MAXALT "   Used for:  Migraine with aura and without status migrainosus, not intractable        Dose:  10 mg   Take 1 tablet (10 mg) by mouth at onset of headache for migraine May repeat dose in 2 hours.  Do not exceed 30 mg in 24 hours   Quantity:  18 tablet   Refills:  0       SYNTHROID PO        Dose:  88 mcg   Take 88 mcg by mouth daily   Refills:  0       TESTOSTERONE IM        Inject  into the muscle See Admin Instructions.  q 2 weeks   Refills:  0       UNABLE TO FIND   Indication:  1/4 tsp   Used for:  Diverticulitis of colon, Abdominal pain, left upper quadrant        Dose:  15 %   15 % daily MEDICATION NAME: progesterone cream Applies a 1/4 teaspoon daily   Refills:  0                Protect others around you: Learn how to safely use, store and throw away your medicines at www.disposemymeds.org.             Medication List: This is a list of all your medications and when to take them. Check marks below indicate your daily home schedule. Keep this list as a reference.      Medications           Morning Afternoon Evening Bedtime As Needed    ALIVE WOMENS GUMMY Chew   Take 1 chew tab by mouth daily                                BENEFIBER PO   Take by mouth daily as needed                                BIOTIN 5000 PO   Take 1 chew tab by mouth daily                                CALCIUM-VITAMIN D PO   Take 1 tablet by mouth 2 times daily                                COLACE PO   Take 50 mg by mouth as needed                                CYTOMEL PO   Take 2 tablets by mouth daily 1 day 1 tablet and the next day 2 tablets                                omeprazole 20 MG CR capsule   Commonly known as:  priLOSEC   Take 1 capsule (20 mg) by mouth daily                                rizatriptan 10 MG tablet   Commonly known as:  MAXALT   Take 1 tablet (10 mg) by mouth at onset of headache for migraine May repeat dose in 2 hours.  Do not exceed 30 mg in 24 hours                                SYNTHROID PO   Take 88 mcg  by mouth daily                                TESTOSTERONE IM   Inject  into the muscle See Admin Instructions.  q 2 weeks                                UNABLE TO FIND   15 % daily MEDICATION NAME: progesterone cream Applies a 1/4 teaspoon daily

## 2017-06-02 NOTE — OR NURSING
ERCP with sphincterotomy, stone removal in OR 51.  No specimens collected.  Cautery pad applied to right flank.  Skin intact pre and post cautery use.  Auto setting for sphincterotomy.

## 2017-06-02 NOTE — IP AVS SNAPSHOT
[place] [address]    [place] [address]    Phone:  [phone]                                       After Visit Summary   6/2/2017    Giovana Key    MRN: [id number]           After Visit Summary Signature Page     I have received my discharge instructions, and my questions have been answered. I have discussed any challenges I see with this plan with the nurse or doctor.    ..........................................................................................................................................  Patient/Patient Representative Signature      ..........................................................................................................................................  Patient Representative Print Name and Relationship to Patient    ..................................................               ................................................  Date                                            Time    ..........................................................................................................................................  Reviewed by Signature/Title    ...................................................              ..............................................  Date                                                            Time

## 2017-06-02 NOTE — DISCHARGE INSTRUCTIONS
Same Day Surgery Discharge Instructions for  Sedation and General Anesthesia       It's not unusual to feel dizzy, light-headed or faint for up to 24 hours after surgery or while taking pain medication.  If you have these symptoms: sit for a few minutes before standing and have someone assist you when you get up to walk or use the bathroom.      You should rest and relax for the next 24 hours. We recommend you make arrangements to have an adult stay with you for at least 24 hours after your discharge.  Avoid hazardous and strenuous activity.      DO NOT DRIVE any vehicle or operate mechanical equipment for 24 hours following the end of your surgery.  Even though you may feel normal, your reactions may be affected by the medication you have received.      Do not drink alcoholic beverages for 24 hours following surgery.       Slowly progress to your regular diet as you feel able. It's not unusual to feel nauseated and/or vomit after receiving anesthesia.  If you develop these symptoms, drink clear liquids (apple juice, ginger ale, broth, 7-up, etc. ) until you feel better.  If your nausea and vomiting persists for 24 hours, please notify your surgeon.        All narcotic pain medications, along with inactivity and anesthesia, can cause constipation. Drinking plenty of liquids and increasing fiber intake will help.      For any questions of a medical nature, call your surgeon.      Do not make important decisions for 24 hours.      If you had general anesthesia, you may have a sore throat for a couple of days related to the breathing tube used during surgery.  You may use Cepacol lozenges to help with this discomfort.  If it worsens or if you develop a fever, contact your surgeon.       If you feel your pain is not well managed with the pain medications prescribed by your surgeon, please contact your surgeon's office to let them know so they can address your concerns.       Dr. Barnett  [phone]    1. Clear  liquid diet tonight  2. Resume regular diet tomorrow  3. May take ibuprofen for pain  4. If pain worsens go to the emergency department.       Percocet one tablet given at  4:23pm

## 2017-06-02 NOTE — ANESTHESIA PREPROCEDURE EVALUATION
Anesthesia Evaluation     . Pt has had prior anesthetic.     History of anesthetic complications   - PONV        ROS/MED HX    ENT/Pulmonary:      (-) tobacco use and sleep apnea   Neurologic:       Cardiovascular: Comment: WPW        METS/Exercise Tolerance:  >4 METS   Hematologic:         Musculoskeletal:         GI/Hepatic:     (+) GERD Asymptomatic on medication, cholecystitis/cholelithiasis,       Renal/Genitourinary:     (+) chronic renal disease, Pt does not require dialysis,       Endo:     (+) thyroid problem hypothyroidism, .      Psychiatric:         Infectious Disease:         Malignancy:         Other:                     Physical Exam  Normal systems: cardiovascular, pulmonary and dental    Airway   Mallampati: I  TM distance: >3 FB  Neck ROM: full    Dental     Cardiovascular       Pulmonary                     Anesthesia Plan      History & Physical Review  History and physical reviewed and following examination; no interval change.    ASA Status:  2 .    NPO Status:  > 8 hours    Plan for MAC   PONV prophylaxis:  Ondansetron (or other 5HT-3)       Postoperative Care  Postoperative pain management:  IV analgesics.      Consents  Anesthetic plan, risks, benefits and alternatives discussed with:  Spouse and Patient..                          .

## 2017-06-02 NOTE — ANESTHESIA POSTPROCEDURE EVALUATION
Patient: Giovana Key    Procedure(s):  ENDOSCOPIC RETROGRADE CHOLANGIOPANCREATOGRAM (ERCP) STONE REMOVAL, SPINCHTEROTOMY - Wound Class: II-Clean Contaminated    Diagnosis:GALLSTONES  Diagnosis Additional Information: No value filed.    Anesthesia Type:  MAC    Note:  Anesthesia Post Evaluation    Patient location during evaluation: PACU  Patient participation: Able to fully participate in evaluation  Level of consciousness: awake and alert  Pain management: adequate  Airway patency: patent  Cardiovascular status: acceptable  Respiratory status: acceptable  Hydration status: acceptable  PONV: none     Anesthetic complications: None          Last vitals:  Vitals:    06/02/17 1515 06/02/17 1530 06/02/17 1545   BP: 130/75 133/72 134/69   Resp: 19 15 14   Temp:      SpO2: 91% 93% 94%         Electronically Signed By: Salvatore Sagastume MD  June 2, 2017  4:11 PM

## 2017-06-07 LAB — INTERPRETATION ECG - MUSE: NORMAL

## 2017-06-16 ENCOUNTER — TRANSFERRED RECORDS (OUTPATIENT)
Dept: FAMILY MEDICINE | Facility: CLINIC | Age: 67
End: 2017-06-16

## 2017-07-01 ENCOUNTER — HEALTH MAINTENANCE LETTER (OUTPATIENT)
Age: 67
End: 2017-07-01

## 2017-09-20 DIAGNOSIS — Z23 NEED FOR PROPHYLACTIC VACCINATION AND INOCULATION AGAINST INFLUENZA: Primary | ICD-10-CM

## 2017-09-20 PROCEDURE — 90662 IIV NO PRSV INCREASED AG IM: CPT | Performed by: FAMILY MEDICINE

## 2017-09-20 PROCEDURE — G0008 ADMIN INFLUENZA VIRUS VAC: HCPCS | Performed by: FAMILY MEDICINE

## 2017-09-20 NOTE — PROGRESS NOTES
Injectable Influenza Immunization Documentation    1.  Is the person to be vaccinated sick today?   No    2. Does the person to be vaccinated have an allergy to a component   of the vaccine?   No    3. Has the person to be vaccinated ever had a serious reaction   to influenza vaccine in the past?   No    4. Has the person to be vaccinated ever had Guillain-Barré syndrome?   No    Form completed by CHRISTIANO HENRIQUEZ MA

## 2017-10-17 ENCOUNTER — OFFICE VISIT (OUTPATIENT)
Dept: FAMILY MEDICINE | Facility: CLINIC | Age: 67
End: 2017-10-17

## 2017-10-17 VITALS
HEART RATE: 57 BPM | DIASTOLIC BLOOD PRESSURE: 86 MMHG | TEMPERATURE: 98.3 F | SYSTOLIC BLOOD PRESSURE: 132 MMHG | RESPIRATION RATE: 16 BRPM | BODY MASS INDEX: 29.26 KG/M2 | OXYGEN SATURATION: 97 % | WEIGHT: 165.2 LBS

## 2017-10-17 DIAGNOSIS — J01.00 ACUTE NON-RECURRENT MAXILLARY SINUSITIS: ICD-10-CM

## 2017-10-17 DIAGNOSIS — B00.1 COLD SORE: Primary | ICD-10-CM

## 2017-10-17 DIAGNOSIS — J20.9 ACUTE BRONCHITIS, UNSPECIFIED ORGANISM: ICD-10-CM

## 2017-10-17 PROCEDURE — 99213 OFFICE O/P EST LOW 20 MIN: CPT | Performed by: FAMILY MEDICINE

## 2017-10-17 RX ORDER — BENZONATATE 200 MG/1
200 CAPSULE ORAL 3 TIMES DAILY PRN
Qty: 30 CAPSULE | Refills: 1 | Status: SHIPPED | OUTPATIENT
Start: 2017-10-17 | End: 2017-11-29

## 2017-10-17 RX ORDER — ACYCLOVIR 50 MG/G
OINTMENT TOPICAL
COMMUNITY
End: 2017-10-17

## 2017-10-17 RX ORDER — DOXYCYCLINE 100 MG/1
100 CAPSULE ORAL 2 TIMES DAILY
Qty: 20 CAPSULE | Refills: 0 | Status: SHIPPED | OUTPATIENT
Start: 2017-10-17 | End: 2017-10-31

## 2017-10-17 RX ORDER — ACYCLOVIR 50 MG/G
OINTMENT TOPICAL
Qty: 15 G | Refills: 1 | Status: SHIPPED | OUTPATIENT
Start: 2017-10-17 | End: 2017-10-19

## 2017-10-17 NOTE — PROGRESS NOTES
Problem(s) Oriented visit        SUBJECTIVE:                                                    Giovana Key is a 66 year old female who presents to clinic today for cough for about 5 days of cough. She has pain in her left maxillary area. She has yellow nasal drainage. Her cough is productive of colored sputum. She feels congestion deep in her chest. She does not smoke. She quit at age 31. She denies fever. She does have some sweats and chills. She is short of breath with mild activity. She denies any RAD. She has chronic sinus congestion and does daily nasal lavage. She thinks there has been something brewing in her left sinus for a few weeks.      Problem list, Medication list, Allergies, and Medical/Social/Surgical histories reviewed in EPIC and updated as appropriate.   Additional history: as documented    ROS:  5 point ROS completed and negative except noted above, including Gen, CV, Resp, GI, MS      Histories:   Patient Active Problem List   Diagnosis     Migraine     heel pain     Peroneal tendonitis     Pain in joint, ankle and foot     Other enthesopathy of ankle and tarsus     Health Care Home     Advanced directives, counseling/discussion     Diverticulitis of colon     Graves disease     Pharyngeal dysphagia     Past Surgical History:   Procedure Laterality Date     CHOLECYSTECTOMY       COSMETIC SURGERY      breast augmentation     ENDOSCOPIC RETROGRADE CHOLANGIOPANCREATOGRAM N/A 6/2/2017    Procedure: COMBINED ENDOSCOPIC RETROGRADE CHOLANGIOPANCREATOGRAPHY, SPHINCTEROTOMY;  ENDOSCOPIC RETROGRADE CHOLANGIOPANCREATOGRAM (ERCP) STONE REMOVAL, SPINCHTEROTOMY;  Surgeon: Preston Barnett MD;  Location:  OR     ENT SURGERY  age 26    tonsillectomy     ESOPHAGOSCOPY, GASTROSCOPY, DUODENOSCOPY (EGD), COMBINED N/A 5/23/2017    Procedure: COMBINED ENDOSCOPIC ULTRASOUND, ESOPHAGOSCOPY, GASTROSCOPY, DUODENOSCOPY (EGD);  ENDOSCOPIC ULTRASOUND, ESOPHAGOSCOPY, GASTROSCOPY, DUODENOSCOPY (EGD) (MAC SEDATION) ;   Surgeon: Dot Vargas MD;  Location:  GI     ESOPHAGOSCOPY, GASTROSCOPY, DUODENOSCOPY (EGD), COMBINED N/A 5/23/2017    Procedure: COMBINED ESOPHAGOSCOPY, GASTROSCOPY, DUODENOSCOPY (EGD), BIOPSY SINGLE OR MULTIPLE;;  Surgeon: Dot Vargas MD;  Location:  GI     GYN SURGERY      tubal ligation x2     HYSTERECTOMY VAGINAL  1997    Dr Grier       Social History   Substance Use Topics     Smoking status: Former Smoker     Types: Cigarettes     Quit date: 1/1/1983     Smokeless tobacco: Never Used     Alcohol use 0.0 oz/week     0 Standard drinks or equivalent per week      Comment: occasionally     Family History   Problem Relation Age of Onset     Hypertension Other            OBJECTIVE:                                                    /86  Pulse 57  Temp 98.3  F (36.8  C) (Oral)  Resp 16  Wt 74.9 kg (165 lb 3.2 oz)  SpO2 97%  BMI 29.26 kg/m2  Body mass index is 29.26 kg/(m^2).   GENERAL APPEARANCE: Alert, no acute distress  EYES: PERRL, EOM normal, conjunctiva and lids normal  HENT: right TM normal, left TM normal, nose purulent rhinorrhea bilateral, maxillary sinus tenderness left, throat/mouth:mucous membranes moist, posterior pharyngeal cobblestoning  NECK: No adenopathy,masses or thyromegaly  RESP: lungs clear to auscultation   CV: normal rate, regular rhythm, no murmur or gallop  MS: extremities normal, no peripheral edema  NEURO: Alert, oriented, speech and mentation normal  PSYCH: mentation appears normal, affect and mood normal   Labs Resulted Today:   Results for orders placed or performed during the hospital encounter of 06/02/17   ENDOSCOPIC RETROGRADE CHOLANGIOPANCREATOGRAPHY   Result Value Ref Range    ERCP       [facility] Endoscopy Department  _______________________________________________________________________________  Patient Name: Giovana Key             Procedure Date: 6/2/2017 12:49 PM  MRN: [id number]                       Account  Number: [id number]  YOB: 1950              Admit Type: Outpatient  Age: 66                                Note Status: Finalized  Attending MD: Preston Barnett MD  Total Sedation Time:   Instrument Name: 317 TJF-Q180V ERCP     _______________________________________________________________________________     Procedure:                ERCP  Indications:              For therapy of bile duct stone(s)  Providers:                Preston Barnett MD, Kasia Campos,                             RN, Giovana Sapp RN  Referring MD:             Sriram Viramontes MD  Complications:            No immediate complications.  _______________________________________________________________________________  Procedure:                 Pre-Anesthesia Assessment:                            - Prior to the procedure, a History and Physical                             was performed, and patient medications and                             allergies were reviewed. The patient is competent.                             The risks and benefits of the procedure and the                             sedation options and risks were discussed with the                             patient. All questions were answered and informed                             consent was obtained. Patient identification and                             proposed procedure were verified by the physician                             in the pre-procedure area. Mental Status                             Examination: alert and oriented. Airway                             Examination: normal oropharyngeal airway and neck                             mobility. Respiratory Examination: clear to                             auscultation. CV Examination : normal. Prophylactic                             Antibiotics: The patient does not require                             prophylactic antibiotics. Prior Anticoagulants: The                              patient has taken no previous anticoagulant or                             antiplatelet agents. ASA Grade Assessment: II - A                             patient with mild systemic disease. After reviewing                             the risks and benefits, the patient was deemed in                             satisfactory condition to undergo the procedure.                             The anesthesia plan was to use monitored anesthesia                             care (MAC). Immediately prior to administration of                             medications, the patient was re-assessed for                             adequacy to receive sedatives. The heart rate,                             respiratory rate, oxygen saturations, blood                             pressure, adequacy of pulmonary  ventilation, and                             response to care were monitored throughout the                             procedure. The physical status of the patient was                             re-assessed after the procedure.                            After obtaining informed consent, the scope was                             passed under direct vision. Throughout the                             procedure, the patient's blood pressure, pulse, and                             oxygen saturations were monitored continuously. The                             Duodenoscope was introduced through the mouth, and                             used to inject contrast into and used to inject                             contrast into the bile duct. The ERCP was                             accomplished with ease. The patient tolerated the                             procedure well.                                                                                   Findings:       A  film of th e abdomen was obtained. Surgical clips, consistent        with previous cholecystectomy, were seen in the area of the right upper        quadrant  of the abdomen. The esophagus was successfully intubated under        direct vision. The scope was advanced to a normal major papilla in the        descending duodenum without detailed examination of the pharynx, larynx        and associated structures, and upper GI tract. The upper GI tract was        grossly normal. The bile duct was deeply cannulated with the short-nosed        traction sphincterotome. Contrast was injected. I personally interpreted        the bile duct images. There was brisk flow of contrast through the        ducts. Image quality was excellent. Contrast extended to the hepatic        ducts. The lower third of the main bile duct contained filling defect(s)        thought to be a stone. A 7 mm biliary sphincterotomy was made with a        monofilament traction (standard) sphincterotome using ERBE        electrocautery. Th ere was no post-sphincterotomy bleeding. To discover        objects, the biliary tree was swept with a 12 mm balloon starting at the        upper third of the main bile duct. All stones were removed.                                                                                   Impression:               - A filling defect consistent with a stone was seen                             on the cholangiogram.                            - Choledocholithiasis was found. Complete removal                             was accomplished by biliary sphincterotomy and                             balloon extraction.                            - A biliary sphincterotomy was performed.                            - The biliary tree was swept.  Recommendation:           - Discharge patient to home (ambulatory).                            - Clear liquid diet for 4 hours.                                                                                   Procedure Code(s):       --- Professional ---       4 9919, Endoscopic retrograde cholangiopancreatography (ERCP); with        removal of  calculi/debris from biliary/pancreatic duct(s)       89805, Endoscopic retrograde cholangiopancreatography (ERCP); with        sphincterotomy/papillotomy  Diagnosis Code(s):       --- Professional ---       K80.50, Calculus of bile duct without cholangitis or cholecystitis        without obstruction       R93.2, Abnormal findings on diagnostic imaging of liver and biliary tract    CPT copyright 2016 American Medical Association. All rights reserved.    The codes documented in this report are preliminary and upon  review may   be revised to meet current compliance requirements.      ______________________  Preston Barnett MD  6/2/2017 1:38:34 PM  I was physically present for the entire viewing portion of the exam.  Preston Barnett MD  Number of Addenda: 0    Note Initiated On: 6/2/2017 12:49 PM  MRN:                      [id number]  Procedure Date:           6/2/2017 12:49:52 PM  Estimated  Blood Loss:       Scope In:  Scope Out:     XR Surgery JULIANNA L/T 5 Min Fluoro w Stills    Narrative    SURGERY C-ARM FLUOROSCOPY LESS THAN FIVE MINUTES WITH STILLS  6/2/2017  1:30 PM     HISTORY: ERCP. [id number], 5 images, 1.0 min ft.    COMPARISON: None.      Impression    IMPRESSION: Total of 5 spot fluoroscopic images obtained during ERCP.  Total fluoroscopy time is 1 minute. Cholecystectomy clips identified.  Slightly prominent extrahepatic common bile duct identified, though no  intrahepatic biliary dilatation. Balloon noted throughout the common  bile duct during the procedure.    MICHI RUSSELL MD   EKG 12-lead, tracing only   Result Value Ref Range    Interpretation ECG Click View Image link to view waveform and result      ASSESSMENT/PLAN:                                                        Giovana was seen today for cough, mouth/lip problem and refill request.    Diagnoses and all orders for this visit:    Cold sore  -     acyclovir (ZOVIRAX) 5 % ointment; Apply topically 5 times daily Cold sores    Acute  non-recurrent maxillary sinusitis  -     doxycycline monohydrate 100 MG capsule; Take 1 capsule (100 mg) by mouth 2 times daily for 14 days    Acute bronchitis, unspecified organism  -     doxycycline monohydrate 100 MG capsule; Take 1 capsule (100 mg) by mouth 2 times daily for 14 days  -     benzonatate (TESSALON) 200 MG capsule; Take 1 capsule (200 mg) by mouth 3 times daily as needed for cough            The following health maintenance items are reviewed in Epic and correct as of today:  Health Maintenance   Topic Date Due     MIGRAINE ACTION PLAN  11/07/1969     PAP Q5 YEARS  11/07/1981     HPV Q5 YEARS (Complete with PAP)  11/07/1981     FALL RISK ASSESSMENT  11/21/2017     PNEUMOCOCCAL (2 of 2 - PPSV23) 01/04/2018     MAMMO SCREEN Q2 YR (SYSTEM ASSIGNED)  11/21/2018     ADVANCE DIRECTIVE PLANNING Q5 YRS  07/31/2020     TETANUS IMMUNIZATION (SYSTEM ASSIGNED)  04/07/2021     LIPID SCREEN Q5 YR FEMALE (SYSTEM ASSIGNED)  01/05/2022     COLONOSCOPY Q10 YR  01/17/2027     INFLUENZA VACCINE (SYSTEM ASSIGNED)  Completed     DEXA SCAN SCREENING (SYSTEM ASSIGNED)  Completed     HEPATITIS C SCREENING  Completed       Nidia Pierce MD  [facility]  Family Practice  [facility]  [phone]    For any issues my office # is [phone]      Received call from [facility] that patients rx for Acyclovir ointment is not covered under her insurance. Submitted PA to .  Received fax back stating PA was denied. They will pay for the tablets.  Called patient and she says will try the tablets.  Per Dr Pierce rx sent to [facility] for Acyclovir 400 mg # 15.

## 2017-10-17 NOTE — MR AVS SNAPSHOT
"              After Visit Summary   10/17/2017    Giovana Key    MRN: [id number]           Patient Information     Date Of Birth          1950        Visit Information        Provider Department      10/17/2017 10:15 AM Nidia Pierce MD [facility]        Today's Diagnoses     Cold sore    -  1    Acute non-recurrent maxillary sinusitis        Acute bronchitis, unspecified organism           Follow-ups after your visit        Who to contact     If you have questions or need follow up information about today's clinic visit or your schedule please contact [facility] directly at [phone].  Normal or non-critical lab and imaging results will be communicated to you by MyWobilehart, letter or phone within 4 business days after the clinic has received the results. If you do not hear from us within 7 days, please contact the clinic through MyWobilehart or phone. If you have a critical or abnormal lab result, we will notify you by phone as soon as possible.  Submit refill requests through Spotsi or call your pharmacy and they will forward the refill request to us. Please allow 3 business days for your refill to be completed.          Additional Information About Your Visit        MyChart Information     Spotsi lets you send messages to your doctor, view your test results, renew your prescriptions, schedule appointments and more. To sign up, go to [email or web address] . Click on \"Log in\" on the left side of the screen, which will take you to the Welcome page. Then click on \"Sign up Now\" on the right side of the page.     You will be asked to enter the access code listed below, as well as some personal information. Please follow the directions to create your username and password.     Your access code is: [email or web address]  Expires: 1/15/2018 10:52 AM     Your access code will  in 90 days. If you need help or a new code, please call your [place] clinic or [phone].        Care " EveryWhere ID     This is your Care EveryWhere ID. This could be used by other organizations to access your [place] medical records  [id number]        Your Vitals Were     Pulse Temperature Respirations Pulse Oximetry BMI (Body Mass Index)       57 98.3  F (36.8  C) (Oral) 16 97% 29.26 kg/m2        Blood Pressure from Last 3 Encounters:   10/17/17 132/86   06/02/17 147/76   05/23/17 114/65    Weight from Last 3 Encounters:   10/17/17 74.9 kg (165 lb 3.2 oz)   06/02/17 74.3 kg (163 lb 12.8 oz)   05/10/17 73.9 kg (163 lb)              Today, you had the following     No orders found for display         Today's Medication Changes          These changes are accurate as of: 10/17/17 10:52 AM.  If you have any questions, ask your nurse or doctor.               Start taking these medicines.        Dose/Directions    benzonatate 200 MG capsule   Commonly known as:  TESSALON   Used for:  Acute bronchitis, unspecified organism   Started by:  Nidia Pierce MD        Dose:  200 mg   Take 1 capsule (200 mg) by mouth 3 times daily as needed for cough   Quantity:  30 capsule   Refills:  1       doxycycline monohydrate 100 MG capsule   Used for:  Acute non-recurrent maxillary sinusitis, Acute bronchitis, unspecified organism   Started by:  Nidia Pierce MD        Dose:  100 mg   Take 1 capsule (100 mg) by mouth 2 times daily for 14 days   Quantity:  20 capsule   Refills:  0            Where to get your medicines      These medications were sent to [facility] [address] [place] [address] & [address]  [address] [place] [id number]     Phone:  [phone]     acyclovir 5 % ointment    benzonatate 200 MG capsule    doxycycline monohydrate 100 MG capsule                Primary Care Provider Office Phone # Fax #    Nidia Pierce [phone]       [facility] [address]  [facility] [address]        Equal Access to Services      EUGENIO DONNELLY : Hadii aad ku sandra [place], waaxda luqadaha, qaybta kaalmada adeulysses, racheal bartolome sabinebrandon leonalexandroalexys esteban . So [facility] [phone].    ATENCIÓN: Si habla español, tiene a myers disposición servicios gratuitos de asistencia lingüística. Llame al [phone].    We comply with applicable federal civil rights laws and [place] laws. We do not discriminate on the basis of race, color, national origin, age, disability, sex, sexual orientation, or gender identity.            Thank you!     Thank you for choosing [facility]  for your care. Our goal is always to provide you with excellent care. Hearing back from our patients is one way we can continue to improve our services. Please take a few minutes to complete the written survey that you may receive in the mail after your visit with us. Thank you!             Your Updated Medication List - Protect others around you: Learn how to safely use, store and throw away your medicines at www.disposemymeds.org.          This list is accurate as of: 10/17/17 10:52 AM.  Always use your most recent med list.                   Brand Name Dispense Instructions for use Diagnosis    acyclovir 5 % ointment    ZOVIRAX    15 g    Apply topically 5 times daily Cold sores    Cold sore       ALIVE WOMENS GUMMY Chew      Take 1 chew tab by mouth daily        BENEFIBER PO      Take by mouth daily as needed        benzonatate 200 MG capsule    TESSALON    30 capsule    Take 1 capsule (200 mg) by mouth 3 times daily as needed for cough    Acute bronchitis, unspecified organism       BIOTIN 5000 PO      Take 1 chew tab by mouth daily        CALCIUM-VITAMIN D PO      Take 1 tablet by mouth daily        COLACE PO      Take 50 mg by mouth as needed        CYTOMEL PO      Take 10 mcg by mouth daily 2  @ 5 mcg tablets = 10 mcg/d        doxycycline monohydrate 100 MG capsule     20 capsule    Take 1 capsule (100 mg) by mouth 2 times daily for 14 days    Acute  non-recurrent maxillary sinusitis, Acute bronchitis, unspecified organism       GINGER ROOT PO      Take by mouth as needed For stomach upset, nausea        MOTRIN IB PO      Take 200 mg by mouth daily as needed for moderate pain or fever        rizatriptan 10 MG tablet    MAXALT    18 tablet    Take 1 tablet (10 mg) by mouth at onset of headache for migraine May repeat dose in 2 hours.  Do not exceed 30 mg in 24 hours    Migraine with aura and without status migrainosus, not intractable       ROBITUSSIN CHEST CONGESTION 100 MG/5ML Syrp   Generic drug:  guaiFENesin      Take 10 mLs by mouth every 4 hours as needed for cough        SYNTHROID PO      Take 88 mcg by mouth daily        TESTOSTERONE IM      Inject  into the muscle See Admin Instructions.  q 2 weeks        UNABLE TO FIND      15 % daily MEDICATION NAME: progesterone cream Applies a 1/4 teaspoon daily    Diverticulitis of colon, Abdominal pain, left upper quadrant

## 2017-10-19 RX ORDER — ACYCLOVIR 400 MG/1
TABLET ORAL
Qty: 15 TABLET | Refills: 11 | Status: SHIPPED | OUTPATIENT
Start: 2017-10-19 | End: 2017-11-29

## 2017-10-21 DIAGNOSIS — J01.01 ACUTE RECURRENT MAXILLARY SINUSITIS: Primary | ICD-10-CM

## 2017-10-21 RX ORDER — AZITHROMYCIN 250 MG/1
TABLET, FILM COATED ORAL
Qty: 6 TABLET | Refills: 0 | Status: SHIPPED | OUTPATIENT
Start: 2017-10-21 | End: 2017-11-29

## 2017-10-21 NOTE — PROGRESS NOTES
Pt called re:doxycycline for sinus infection. Making her nauseated, near vomiting, despite taking with food.  Discussed situation and elected to try azithromycin.

## 2017-11-29 ENCOUNTER — OFFICE VISIT (OUTPATIENT)
Dept: OBGYN | Facility: CLINIC | Age: 67
End: 2017-11-29
Payer: COMMERCIAL

## 2017-11-29 ENCOUNTER — RADIANT APPOINTMENT (OUTPATIENT)
Dept: MAMMOGRAPHY | Facility: CLINIC | Age: 67
End: 2017-11-29
Payer: COMMERCIAL

## 2017-11-29 VITALS
HEIGHT: 64 IN | WEIGHT: 164 LBS | DIASTOLIC BLOOD PRESSURE: 72 MMHG | BODY MASS INDEX: 28 KG/M2 | SYSTOLIC BLOOD PRESSURE: 138 MMHG

## 2017-11-29 DIAGNOSIS — Z12.31 VISIT FOR SCREENING MAMMOGRAM: ICD-10-CM

## 2017-11-29 DIAGNOSIS — Z79.890 POST-MENOPAUSE ON HRT (HORMONE REPLACEMENT THERAPY): ICD-10-CM

## 2017-11-29 DIAGNOSIS — N32.81 OAB (OVERACTIVE BLADDER): Primary | ICD-10-CM

## 2017-11-29 DIAGNOSIS — Z01.419 ENCOUNTER FOR GYNECOLOGICAL EXAMINATION (GENERAL) (ROUTINE) WITHOUT ABNORMAL FINDINGS: ICD-10-CM

## 2017-11-29 PROCEDURE — 99397 PER PM REEVAL EST PAT 65+ YR: CPT | Performed by: OBSTETRICS & GYNECOLOGY

## 2017-11-29 PROCEDURE — G0202 SCR MAMMO BI INCL CAD: HCPCS | Mod: TC

## 2017-11-29 PROCEDURE — 77063 BREAST TOMOSYNTHESIS BI: CPT | Mod: TC

## 2017-11-29 ASSESSMENT — PATIENT HEALTH QUESTIONNAIRE - PHQ9
5. POOR APPETITE OR OVEREATING: NOT AT ALL
SUM OF ALL RESPONSES TO PHQ QUESTIONS 1-9: 0

## 2017-11-29 ASSESSMENT — ANXIETY QUESTIONNAIRES
1. FEELING NERVOUS, ANXIOUS, OR ON EDGE: NOT AT ALL
7. FEELING AFRAID AS IF SOMETHING AWFUL MIGHT HAPPEN: NOT AT ALL
6. BECOMING EASILY ANNOYED OR IRRITABLE: NOT AT ALL
3. WORRYING TOO MUCH ABOUT DIFFERENT THINGS: NOT AT ALL
GAD7 TOTAL SCORE: 0
IF YOU CHECKED OFF ANY PROBLEMS ON THIS QUESTIONNAIRE, HOW DIFFICULT HAVE THESE PROBLEMS MADE IT FOR YOU TO DO YOUR WORK, TAKE CARE OF THINGS AT HOME, OR GET ALONG WITH OTHER PEOPLE: NOT DIFFICULT AT ALL
5. BEING SO RESTLESS THAT IT IS HARD TO SIT STILL: NOT AT ALL
2. NOT BEING ABLE TO STOP OR CONTROL WORRYING: NOT AT ALL

## 2017-11-29 NOTE — PROGRESS NOTES
Giovana is a 67 year old  female who presents for annual exam.     Besides routine health maintenance,  she would like to discuss bladder issues.    Do you have a Health Care Directive?: No: Advance care planning was reviewed with patient; patient declined at this time.      Fall risk: Fallen 2 or more times in the past year?: No  Any fall with injury in the past year?: No      HPI:  The patient's PCP is Nidia Pierce MD.   Patient is on bioidentical hormone replacement therapy_ only progesterone cream and testosterone injections every 2 wks   Not on estrogen  Says they  Check her estrogen levels and they are normal    Patient had one child age 17    Patient has concerns about bladder issues  She has increased frequency thru out the day, only voids once at night  Drinks a lot of water   8-9 glasses every day  Has 1 C coffee in am, 1 beer at night  No soda  Denies FERNANDO  Voids only once at night  occ tiny urge incontinence drips, very small volumes  Already has constipation but not fecal incontinence  No bladder pain      GYNECOLOGIC HISTORY:No LMP recorded. Patient has had a hysterectomy..   reports that she quit smoking about 34 years ago. Her smoking use included Cigarettes. She has never used smokeless tobacco.      Patient is sexually active.  STD testing offered?  Declined  Last PHQ-9 score on record=   PHQ-9 SCORE 2017   Total Score 0     Last GAD7 score on record=   ROSALINO-7 SCORE 2017   Total Score 0     Alcohol Score = 3    HEALTH MAINTENANCE:  Cholesterol: (  Cholesterol   Date Value Ref Range Status   2017 179 <200 mg/dL Final      Last Mammo: one year ago, Result: normal, Next Mammo: today   Pap: (No results found for: PAP )  DEXA:   Colonoscopy:  2017, Result:  normal, Next Colonoscopy:     Health maintenance updated:  yes    HISTORY:  Obstetric History       T0      L0     SAB0   TAB0   Ectopic0   Multiple0   Live Births0         Patient Active Problem  List   Diagnosis     Migraine     heel pain     Peroneal tendonitis     Pain in joint, ankle and foot     Other enthesopathy of ankle and tarsus     Health Care Home     Advanced directives, counseling/discussion     Diverticulitis of colon     Graves disease     Pharyngeal dysphagia     Past Surgical History:   Procedure Laterality Date     CHOLECYSTECTOMY       COSMETIC SURGERY      breast augmentation     ENDOSCOPIC RETROGRADE CHOLANGIOPANCREATOGRAM N/A 6/2/2017    Procedure: COMBINED ENDOSCOPIC RETROGRADE CHOLANGIOPANCREATOGRAPHY, SPHINCTEROTOMY;  ENDOSCOPIC RETROGRADE CHOLANGIOPANCREATOGRAM (ERCP) STONE REMOVAL, SPINCHTEROTOMY;  Surgeon: Preston Barnett MD;  Location:  OR     ENT SURGERY  age 26    tonsillectomy     ESOPHAGOSCOPY, GASTROSCOPY, DUODENOSCOPY (EGD), COMBINED N/A 5/23/2017    Procedure: COMBINED ENDOSCOPIC ULTRASOUND, ESOPHAGOSCOPY, GASTROSCOPY, DUODENOSCOPY (EGD);  ENDOSCOPIC ULTRASOUND, ESOPHAGOSCOPY, GASTROSCOPY, DUODENOSCOPY (EGD) (MAC SEDATION) ;  Surgeon: Dot Vargas MD;  Location:  GI     ESOPHAGOSCOPY, GASTROSCOPY, DUODENOSCOPY (EGD), COMBINED N/A 5/23/2017    Procedure: COMBINED ESOPHAGOSCOPY, GASTROSCOPY, DUODENOSCOPY (EGD), BIOPSY SINGLE OR MULTIPLE;;  Surgeon: Dot Vargas MD;  Location:  GI     GYN SURGERY      tubal ligation x2     HYSTERECTOMY VAGINAL  1997    Dr Grier      Social History   Substance Use Topics     Smoking status: Former Smoker     Types: Cigarettes     Quit date: 1/1/1983     Smokeless tobacco: Never Used     Alcohol use 0.0 oz/week     0 Standard drinks or equivalent per week      Comment: occasionally      Problem (# of Occurrences) Relation (Name,Age of Onset)    Hypertension (1) Other            Current Outpatient Prescriptions   Medication Sig     Docusate Sodium (COLACE PO) Take 50 mg by mouth as needed      BIOTIN 5000 PO Take 1 chew tab by mouth daily     Multiple Vitamins-Minerals (ALIVE WOMENS GUMMY) CHEW Take 1 chew  "tab by mouth daily     Levothyroxine Sodium (SYNTHROID PO) Take 88 mcg by mouth daily     UNABLE TO FIND 15 % daily MEDICATION NAME: progesterone cream  Applies a 1/4 teaspoon daily     Liothyronine Sodium (CYTOMEL PO) Take 10 mcg by mouth daily 2  @ 5 mcg tablets = 10 mcg/d     TESTOSTERONE IM Inject  into the muscle See Admin Instructions.  q 2 weeks     No current facility-administered medications for this visit.        Allergies   Allergen Reactions     Imitrex [Sumatriptan] Cramps     Codeine GI Disturbance     Lactose Diarrhea     Strawberries [Strawberry]      Throat itching     Augmentin Hives and Itching     Metronidazole Hives and Itching       Past medical, surgical, social and family history were reviewed and updated in EPIC.    ROS:   12 point review of systems negative other than symptoms noted below.  Cardiovascular: Palpitations  Respiratory: Cough  Gastrointestinal: Bloating  Genitourinary: Night Sweats  Neurologic: Headaches  Endocrine: Loss of Hair    EXAM:  /72  Ht 5' 3.5\" (1.613 m)  Wt 164 lb (74.4 kg)  Breastfeeding? No  BMI 28.6 kg/m2   BMI: Body mass index is 28.6 kg/(m^2).    EXAM:  Constitutional: Appearance: Well nourished, well developed alert, in no acute distress  Neck:  Lymph Nodes:  No lymphadenopathy present    Thyroid:  Gland size normal, nontender, no nodules or masses present  on palpation  Chest:  Respiratory Effort:  Breathing unlabored  Cardiovascular:Heart    Auscultation:  Regular rate, normal rhythm, no murmurs present  Breasts: Inspection of Breasts:  No lymphadenopathy present., Palpation of Breasts and Axillae:  No masses present on palpation, no breast tenderness., Axillary Lymph Nodes:  No lymphadenopathy present. and No nodularity, asymmetry or nipple discharge bilaterally.    Has implants that are quite firm  Gastrointestinal:  Abdominal Examination:  Abdomen nontender to palpation, tone normal without     rigidity or guarding, no masses present, umbilicus " without lesions    Liver and speen:  No hepatomegaly present, liver nontender to palpation    Hernias:  No hernias present  Lymphatic: Lymph Nodes:  No other lymphadenopathy present  Skin:  General Inspection:  No rashes present, no lesions present, no areas of  discoloration.    Genitalia and Groin:  No rashes present, no lesions present, no areas of  discoloration, no masses present  Neurologic/Psychiatric:    Mental Status:  Oriented X3     Pelvic Exam:  External Genitalia:     Normal appearance for age, no discharge present, no tenderness present, no inflammatory lesions present, color normal  Vagina:     Normal vaginal vault without central or paravaginal defects, no discharge present, no inflammatory lesions present, no masses present  Bladder:     Nontender to palpation  Urethra:   Urethral Body:  Urethra palpation normal, urethra structural support normal   Urethral Meatus:  No erythema or lesions present    Adnexa:     No adnexal tenderness present, no adnexal masses present  Perineum:     Perineum within normal limits, no evidence of trauma, no rashes or skin lesions present  Anus:     Anus within normal limits, no hemorrhoids present  Inguinal Lymph Nodes:     No lymphadenopathy present  Pubic Hair:     Normal pubic hair distribution for age  Genitalia and Groin:     No rashes present, no lesions present, no areas of discoloration, no masses present      COUNSELING:   Reviewed preventive health counseling, as reflected in patient instructions       HRT    BMI:  Body mass index is 28.6 kg/(m^2).     reports that she quit smoking about 34 years ago. Her smoking use included Cigarettes. She has never used smokeless tobacco.        ASSESSMENT:  67 year old female with satisfactory annual exam.    ICD-10-CM    1. OAB (overactive bladder) N32.81    2. Post-menopause on HRT (hormone replacement therapy) Z79.890    3. Encounter for gynecological examination (general) (routine) without abnormal findings Z01.419         PLAN:  We discussed her oab symptoms, triggers and causes  Not serious enough to justify meds at this time  Discussed avoiding acidic beverages which are common triggers    She wanted to discuss her hormone therapy that is provided by another provider.  I don't think her progesterone is providing her any health benefit.   The testosterone can impact her lipids.    She is at least avoiding exogenous estrogen.     More than 50% of her visit today was spent on evalation and management of her medical concerns outside of preventive gyn services.     Neema Allison MD

## 2017-11-29 NOTE — MR AVS SNAPSHOT
"              After Visit Summary   2017    Giovana Key    MRN: [id number]           Patient Information     Date Of Birth          1950        Visit Information        Provider Department      2017 11:30 AM Neema Allison MD [facility]        Today's Diagnoses     OAB (overactive bladder)    -  1    Post-menopause on HRT (hormone replacement therapy)        Encounter for gynecological examination (general) (routine) without abnormal findings           Follow-ups after your visit        Who to contact     If you have questions or need follow up information about today's clinic visit or your schedule please contact [facility] directly at [phone].  Normal or non-critical lab and imaging results will be communicated to you by MyChart, letter or phone within 4 business days after the clinic has received the results. If you do not hear from us within 7 days, please contact the clinic through [facility] or phone. If you have a critical or abnormal lab result, we will notify you by phone as soon as possible.  Submit refill requests through [employer] or call your pharmacy and they will forward the refill request to us. Please allow 3 business days for your refill to be completed.          Additional Information About Your Visit        MyChart Information     [employer] lets you send messages to your doctor, view your test results, renew your prescriptions, schedule appointments and more. To sign up, go to [email or web address] . Click on \"Log in\" on the left side of the screen, which will take you to the Welcome page. Then click on \"Sign up Now\" on the right side of the page.     You will be asked to enter the access code listed below, as well as some personal information. Please follow the directions to create your username and password.     Your access code is: [email or web address]  Expires: 1/15/2018  9:52 AM     Your access code will  in 90 days. If you need " "help or a new code, please call your [place] clinic or [phone].        Care EveryWhere ID     This is your Care EveryWhere ID. This could be used by other organizations to access your [place] medical records  [id number]        Your Vitals Were     Height Breastfeeding? BMI (Body Mass Index)             5' 3.5\" (1.613 m) No 28.6 kg/m2          Blood Pressure from Last 3 Encounters:   11/29/17 138/72   10/17/17 132/86   06/02/17 147/76    Weight from Last 3 Encounters:   11/29/17 164 lb (74.4 kg)   10/17/17 165 lb 3.2 oz (74.9 kg)   06/02/17 163 lb 12.8 oz (74.3 kg)              Today, you had the following     No orders found for display         Today's Medication Changes          These changes are accurate as of: 11/29/17 12:57 PM.  If you have any questions, ask your nurse or doctor.               Stop taking these medicines if you haven't already. Please contact your care team if you have questions.     rizatriptan 10 MG tablet   Commonly known as:  MAXALT   Stopped by:  Neema Allison MD                    Primary Care Provider Office Phone # Fax #    Zftft Zuri Pierce [phone]       [facility] [address] [place] [address]        Equal Access to Services     EUGENIO DONNELLY AH: Hadii joie muhammad hadasho Sojessica, waaxda luqadaha, qaybta kaalmaracheal barnes. So [facility] [phone].    ATENCIÓN: Si habla español, tiene a myers disposición servicios gratuitos de asistencia lingüística. Ty marquez [phone].    We comply with applicable federal civil rights laws and [place] laws. We do not discriminate on the basis of race, color, national origin, age, disability, sex, sexual orientation, or gender identity.            Thank you!     Thank you for choosing [facility]  for your care. Our goal is always to provide you with excellent care. Hearing back from our patients is one way we can continue to improve our " services. Please take a few minutes to complete the written survey that you may receive in the mail after your visit with us. Thank you!             Your Updated Medication List - Protect others around you: Learn how to safely use, store and throw away your medicines at www.disposemymeds.org.          This list is accurate as of: 11/29/17 12:57 PM.  Always use your most recent med list.                   Brand Name Dispense Instructions for use Diagnosis    ALIVE WOMENS GUMMY Chew      Take 1 chew tab by mouth daily        BIOTIN 5000 PO      Take 1 chew tab by mouth daily        COLACE PO      Take 50 mg by mouth as needed        CYTOMEL PO      Take 10 mcg by mouth daily 2  @ 5 mcg tablets = 10 mcg/d        SYNTHROID PO      Take 88 mcg by mouth daily        TESTOSTERONE IM      Inject  into the muscle See Admin Instructions.  q 2 weeks        UNABLE TO FIND      15 % daily MEDICATION NAME: progesterone cream Applies a 1/4 teaspoon daily    Diverticulitis of colon, Abdominal pain, left upper quadrant

## 2017-11-30 ASSESSMENT — ANXIETY QUESTIONNAIRES: GAD7 TOTAL SCORE: 0

## 2018-02-20 ENCOUNTER — TELEPHONE (OUTPATIENT)
Dept: FAMILY MEDICINE | Facility: CLINIC | Age: 68
End: 2018-02-20

## 2018-02-20 ENCOUNTER — OFFICE VISIT (OUTPATIENT)
Dept: FAMILY MEDICINE | Facility: CLINIC | Age: 68
End: 2018-02-20

## 2018-02-20 VITALS
TEMPERATURE: 97.6 F | BODY MASS INDEX: 28.74 KG/M2 | SYSTOLIC BLOOD PRESSURE: 130 MMHG | OXYGEN SATURATION: 99 % | DIASTOLIC BLOOD PRESSURE: 76 MMHG | RESPIRATION RATE: 16 BRPM | HEART RATE: 55 BPM | WEIGHT: 164.8 LBS

## 2018-02-20 DIAGNOSIS — J01.00 ACUTE NON-RECURRENT MAXILLARY SINUSITIS: ICD-10-CM

## 2018-02-20 DIAGNOSIS — H65.92 OME (OTITIS MEDIA WITH EFFUSION), LEFT: Primary | ICD-10-CM

## 2018-02-20 DIAGNOSIS — I45.6 ANOMALOUS ATRIOVENTRICULAR EXCITATION: ICD-10-CM

## 2018-02-20 DIAGNOSIS — G43.109 MIGRAINE WITH AURA AND WITHOUT STATUS MIGRAINOSUS, NOT INTRACTABLE: ICD-10-CM

## 2018-02-20 PROCEDURE — 99213 OFFICE O/P EST LOW 20 MIN: CPT | Performed by: FAMILY MEDICINE

## 2018-02-20 RX ORDER — AZITHROMYCIN 250 MG/1
TABLET, FILM COATED ORAL
Qty: 6 TABLET | Refills: 0 | Status: SHIPPED | OUTPATIENT
Start: 2018-02-20 | End: 2019-02-13 | Stop reason: ALTCHOICE

## 2018-02-20 RX ORDER — RIZATRIPTAN BENZOATE 10 MG/1
10 TABLET ORAL
COMMUNITY
End: 2018-02-20 | Stop reason: ALTCHOICE

## 2018-02-20 RX ORDER — ELETRIPTAN HYDROBROMIDE 20 MG/1
20-40 TABLET, FILM COATED ORAL
Qty: 18 TABLET | Refills: 1 | Status: SHIPPED | OUTPATIENT
Start: 2018-02-20 | End: 2020-02-21

## 2018-02-20 RX ORDER — DOXYCYCLINE 100 MG/1
100 CAPSULE ORAL 2 TIMES DAILY
Qty: 20 CAPSULE | Refills: 0 | Status: SHIPPED | OUTPATIENT
Start: 2018-02-20 | End: 2018-02-20 | Stop reason: SINTOL

## 2018-02-20 NOTE — PROGRESS NOTES
Problem(s) Oriented visit        SUBJECTIVE:                                                    Giovana Key is a 67 year old female who presents to clinic today for combination of dizziness and lightheadedness. She has had this in the past, but not as bad. Her left ear and sinus feel full and painful. Her appetite is poor. She feels sweats and chills, but hasn't documented fever. She has PND and needs to clear her throat. She feels winded with stairs. She is fatigued. No chest pain. This has been going on for a couple of weeks. She hydrates well.       Problem list, Medication list, Allergies, and Medical/Social/Surgical histories reviewed in EPIC and updated as appropriate.   Additional history: as documented    ROS:  5 point ROS completed and negative except noted above, including Gen, CV, Resp, GI, MS      Histories:   Patient Active Problem List   Diagnosis     Migraine     heel pain     Peroneal tendonitis     Pain in joint, ankle and foot     Other enthesopathy of ankle and tarsus     Health Care Home     Advanced directives, counseling/discussion     Diverticulitis of colon     Graves disease     Pharyngeal dysphagia     Past Surgical History:   Procedure Laterality Date     CHOLECYSTECTOMY       COSMETIC SURGERY      breast augmentation     ENDOSCOPIC RETROGRADE CHOLANGIOPANCREATOGRAM N/A 6/2/2017    Procedure: COMBINED ENDOSCOPIC RETROGRADE CHOLANGIOPANCREATOGRAPHY, SPHINCTEROTOMY;  ENDOSCOPIC RETROGRADE CHOLANGIOPANCREATOGRAM (ERCP) STONE REMOVAL, SPINCHTEROTOMY;  Surgeon: Preston Barnett MD;  Location:  OR     ENT SURGERY  age 26    tonsillectomy     ESOPHAGOSCOPY, GASTROSCOPY, DUODENOSCOPY (EGD), COMBINED N/A 5/23/2017    Procedure: COMBINED ENDOSCOPIC ULTRASOUND, ESOPHAGOSCOPY, GASTROSCOPY, DUODENOSCOPY (EGD);  ENDOSCOPIC ULTRASOUND, ESOPHAGOSCOPY, GASTROSCOPY, DUODENOSCOPY (EGD) (MAC SEDATION) ;  Surgeon: Dot Vargas MD;  Location:  GI     ESOPHAGOSCOPY, GASTROSCOPY,  DUODENOSCOPY (EGD), COMBINED N/A 5/23/2017    Procedure: COMBINED ESOPHAGOSCOPY, GASTROSCOPY, DUODENOSCOPY (EGD), BIOPSY SINGLE OR MULTIPLE;;  Surgeon: Dot Vargas MD;  Location:  GI     GYN SURGERY      tubal ligation x2     HYSTERECTOMY VAGINAL  1997    Dr Grier       Social History   Substance Use Topics     Smoking status: Former Smoker     Types: Cigarettes     Quit date: 1/1/1983     Smokeless tobacco: Never Used     Alcohol use 0.0 oz/week     0 Standard drinks or equivalent per week      Comment: occasionally     Family History   Problem Relation Age of Onset     Hypertension Other            OBJECTIVE:                                                    /76  Pulse 55  Temp 97.6  F (36.4  C) (Oral)  Resp 16  Wt 74.8 kg (164 lb 12.8 oz)  SpO2 99%  BMI 28.74 kg/m2  Body mass index is 28.74 kg/(m^2).   GENERAL APPEARANCE: Alert, no acute distress  EYES: PERRL, EOM normal, conjunctiva and lids normal  HENT: right TM clear fluid behind TM, left TM normal, yellow fluid behind TM, maxillary sinus tenderness left, throat/mouth: posterior pharyngeal cobblestoning.  NECK: No adenopathy,masses or thyromegaly  RESP: lungs clear to auscultation   CV: normal rate, regular rhythm, no murmur or gallop  MS: extremities normal, no peripheral edema  NEURO: Alert, oriented, speech and mentation normal  PSYCH: mentation appears normal, affect and mood normal   Labs Resulted Today:   Results for orders placed or performed in visit on 11/29/17   MA Screen with Implants Bilateral w/Pete    Narrative    SCREENING MAMMOGRAM, BILATERAL, DIGITAL w/CAD AND TOMOSYNTHESIS -  11/29/2017 12:04 PM    BREAST SYMPTOMS: No current breast complaints.     COMPARISON:  11/21/16, 4/21/15, 4/2/14, 11/19/12.    BREAST DENSITY: Heterogeneously dense.    COMMENTS: No findings of suspicion for malignancy.   Bilateral  retroglandular silicone implants with capsular calcification.      Impression    IMPRESSION: BI-RADS  CATEGORY: 2 - Benign.    RECOMMENDED FOLLOW-UP: Annual Mammography.    Exam results letter mailed to patient.      WILLIAM ZAMBRANO MD     ASSESSMENT/PLAN:                                                        Giovana was seen today for dizziness and sinus problem.    Diagnoses and all orders for this visit:    OME (otitis media with effusion), left  -     doxycycline (VIBRAMYCIN) 100 MG capsule; Take 1 capsule (100 mg) by mouth 2 times daily    Acute non-recurrent maxillary sinusitis  -     doxycycline (VIBRAMYCIN) 100 MG capsule; Take 1 capsule (100 mg) by mouth 2 times daily  Recommend sinus lavage and/or Mucinex to help with congestion. Follow up if failure to improve. Follow up with endocrinology for thyroid questions.         The following health maintenance items are reviewed in Epic and correct as of today:  Health Maintenance   Topic Date Due     MIGRAINE ACTION PLAN  11/07/1969     PNEUMOCOCCAL (2 of 2 - PPSV23) 01/04/2018     FALL RISK ASSESSMENT  11/29/2018     MAMMO SCREEN Q2 YR (SYSTEM ASSIGNED)  11/29/2019     ADVANCE DIRECTIVE PLANNING Q5 YRS  07/31/2020     TETANUS IMMUNIZATION (SYSTEM ASSIGNED)  04/07/2021     LIPID SCREEN Q5 YR FEMALE (SYSTEM ASSIGNED)  01/05/2022     COLONOSCOPY Q10 YR  01/17/2027     INFLUENZA VACCINE (SYSTEM ASSIGNED)  Completed     DEXA SCAN SCREENING (SYSTEM ASSIGNED)  Completed     HEPATITIS C SCREENING  Completed       Nidia Pierce MD  [facility]  Family Practice  [facility]  [phone]    For any issues my office # is [phone]

## 2018-02-20 NOTE — MR AVS SNAPSHOT
"              After Visit Summary   2018    Giovana Key    MRN: [id number]           Patient Information     Date Of Birth          1950        Visit Information        Provider Department      2018 10:15 AM Nidia Pierce MD [facility]        Today's Diagnoses     OME (otitis media with effusion), left    -  1    Acute non-recurrent maxillary sinusitis           Follow-ups after your visit        Who to contact     If you have questions or need follow up information about today's clinic visit or your schedule please contact [facility] directly at [phone].  Normal or non-critical lab and imaging results will be communicated to you by Honeyhart, letter or phone within 4 business days after the clinic has received the results. If you do not hear from us within 7 days, please contact the clinic through Honeyhart or phone. If you have a critical or abnormal lab result, we will notify you by phone as soon as possible.  Submit refill requests through [employer] or call your pharmacy and they will forward the refill request to us. Please allow 3 business days for your refill to be completed.          Additional Information About Your Visit        MyChart Information     [employer] lets you send messages to your doctor, view your test results, renew your prescriptions, schedule appointments and more. To sign up, go to [email or web address] . Click on \"Log in\" on the left side of the screen, which will take you to the Welcome page. Then click on \"Sign up Now\" on the right side of the page.     You will be asked to enter the access code listed below, as well as some personal information. Please follow the directions to create your username and password.     Your access code is: [email or web address]  Expires: 2018 10:40 AM     Your access code will  in 90 days. If you need help or a new code, please call your [place] clinic or [phone].        Care EveryWhere ID     This " is your Care EveryWhere ID. This could be used by other organizations to access your [facility] medical records  [id number]        Your Vitals Were     Pulse Temperature Respirations Pulse Oximetry BMI (Body Mass Index)       55 97.6  F (36.4  C) (Oral) 16 99% 28.74 kg/m2        Blood Pressure from Last 3 Encounters:   02/20/18 130/76   11/29/17 138/72   10/17/17 132/86    Weight from Last 3 Encounters:   02/20/18 74.8 kg (164 lb 12.8 oz)   11/29/17 74.4 kg (164 lb)   10/17/17 74.9 kg (165 lb 3.2 oz)              Today, you had the following     No orders found for display         Today's Medication Changes          These changes are accurate as of 2/20/18 10:40 AM.  If you have any questions, ask your nurse or doctor.               Start taking these medicines.        Dose/Directions    doxycycline 100 MG capsule   Commonly known as:  VIBRAMYCIN   Used for:  Acute non-recurrent maxillary sinusitis, OME (otitis media with effusion), left   Started by:  Nidia Pierce MD        Dose:  100 mg   Take 1 capsule (100 mg) by mouth 2 times daily   Quantity:  20 capsule   Refills:  0            Where to get your medicines      These medications were sent to [facility] [address] [place] [address] & [address]  [address] [place] [address]     Phone:  [phone]     doxycycline 100 MG capsule                Primary Care Provider Office Phone # Fax #    Nidia Pierce [phone]       [facility] [address] [place] [address]        Equal Access to Services     [facility] AH: Hadii joie flores Sojessica, waaxda luqadaha, qaybta kaalmaracheal barnes. So [facility] [phone].    ATENCIÓN: Si habla español, tiene a myers disposición servicios gratuitos de asistencia lingüística. Llame al [phone].    We comply with applicable federal civil rights laws and [place] laws. We do not  discriminate on the basis of race, color, national origin, age, disability, sex, sexual orientation, or gender identity.            Thank you!     Thank you for choosing [facility]  for your care. Our goal is always to provide you with excellent care. Hearing back from our patients is one way we can continue to improve our services. Please take a few minutes to complete the written survey that you may receive in the mail after your visit with us. Thank you!             Your Updated Medication List - Protect others around you: Learn how to safely use, store and throw away your medicines at www.disposemymeds.org.          This list is accurate as of 2/20/18 10:40 AM.  Always use your most recent med list.                   Brand Name Dispense Instructions for use Diagnosis    ALIVE WOMENS GUMMY Chew      Take 1 chew tab by mouth daily        BIOTIN 5000 PO      Take 1 chew tab by mouth daily        COLACE PO      Take 50 mg by mouth as needed        CYTOMEL PO      Take 10 mcg by mouth daily 2  @ 5 mcg tablets = 10 mcg/d        doxycycline 100 MG capsule    VIBRAMYCIN    20 capsule    Take 1 capsule (100 mg) by mouth 2 times daily    Acute non-recurrent maxillary sinusitis, OME (otitis media with effusion), left       rizatriptan 10 MG tablet    MAXALT     Take 10 mg by mouth at onset of headache for migraine        SYNTHROID PO      Take 88 mcg by mouth daily        TESTOSTERONE IM      Inject  into the muscle See Admin Instructions.  q 2 weeks        UNABLE TO FIND      15 % daily MEDICATION NAME: progesterone cream Applies a 1/4 teaspoon daily    Diverticulitis of colon, Abdominal pain, left upper quadrant

## 2018-02-21 NOTE — TELEPHONE ENCOUNTER
Patient calls regarding DCN ordered at visit today for sinusitis and left otitis media. Patient does recall having nausea and near-vomiting  with this medication and asking for alternate rx.   10/17/17 DCN was ordered for bronchitis and sinusitis. Patient called 10/19 reporting GI upset. Zpak was prescribed at that time and symptoms resolved. Patient interested in zpak again if appropriate.   Plan: Dr. Pierce is gone for the day. Discussed with Dr. Monsivais and he authorizes rx for zpak. Patient informed and rx sent to pharmacy. She should call if symptoms worsen or persist.   Added doxycycline to allergy list  Chika Samuel RN

## 2018-02-21 NOTE — TELEPHONE ENCOUNTER
Patient was calling about script issues. Second message said disregard.    I see that Dr TOBIAS ok'd Zpack for her after she remembered having had a reaction to DCN in the past. See RN notes. Dr Pierce her PCP. RN message in chart.

## 2018-02-26 ENCOUNTER — TELEPHONE (OUTPATIENT)
Dept: FAMILY MEDICINE | Facility: CLINIC | Age: 68
End: 2018-02-26

## 2018-02-26 NOTE — TELEPHONE ENCOUNTER
Received fax from [employer] that patients rx for Eletriptan needs PA.  Submitted PA on [employer].  Received fax from  stating rx was approved.  Approval dates 01/26/2018-02/26/2019.  Called [employer] to inform them of approval.

## 2018-03-14 ENCOUNTER — TRANSFERRED RECORDS (OUTPATIENT)
Dept: FAMILY MEDICINE | Facility: CLINIC | Age: 68
End: 2018-03-14

## 2018-03-14 ENCOUNTER — OFFICE VISIT (OUTPATIENT)
Dept: FAMILY MEDICINE | Facility: CLINIC | Age: 68
End: 2018-03-14

## 2018-03-14 VITALS
RESPIRATION RATE: 16 BRPM | BODY MASS INDEX: 28.6 KG/M2 | HEART RATE: 57 BPM | DIASTOLIC BLOOD PRESSURE: 80 MMHG | WEIGHT: 164 LBS | SYSTOLIC BLOOD PRESSURE: 128 MMHG | OXYGEN SATURATION: 97 %

## 2018-03-14 DIAGNOSIS — R35.0 URINARY FREQUENCY: Primary | ICD-10-CM

## 2018-03-14 DIAGNOSIS — R09.81 CHRONIC NASAL CONGESTION: ICD-10-CM

## 2018-03-14 DIAGNOSIS — N30.00 ACUTE CYSTITIS WITHOUT HEMATURIA: ICD-10-CM

## 2018-03-14 DIAGNOSIS — M25.562 ACUTE PAIN OF LEFT KNEE: ICD-10-CM

## 2018-03-14 LAB
BACTERIA URINE: ABNORMAL
BILIRUB UR QL STRIP: 0
BLOOD URINE DIP: ABNORMAL
CASTS/LPF: 0
COLOR UR: YELLOW
CRYSTAL URINE: 0
EPITHELIAL CELLS - QUEST: ABNORMAL
GLUCOSE UR STRIP-MCNC: 0 MG/DL
KETONES UR QL STRIP: 0
LEUKOCYTE ESTERASE URINE DIP: 0
MUCOUS URINE: 0
NITRITE UR QL STRIP: ABNORMAL
PH UR STRIP: 5.5 PH (ref 5–9)
PROT UR QL: 0 MG/DL (ref ?–0.01)
RBC URINE: ABNORMAL (ref 0–3)
SP GR UR STRIP: 1.02 (ref 1–1.02)
UROBILINOGEN UR QL STRIP: 0.2 EU/DL (ref 0.2–1)
WBC URINE: 0 (ref 0–3)

## 2018-03-14 PROCEDURE — 70220 X-RAY EXAM OF SINUSES: CPT | Mod: FY | Performed by: FAMILY MEDICINE

## 2018-03-14 PROCEDURE — 99214 OFFICE O/P EST MOD 30 MIN: CPT | Performed by: FAMILY MEDICINE

## 2018-03-14 PROCEDURE — 81003 URINALYSIS AUTO W/O SCOPE: CPT | Performed by: FAMILY MEDICINE

## 2018-03-14 PROCEDURE — 73560 X-RAY EXAM OF KNEE 1 OR 2: CPT | Mod: FY | Performed by: FAMILY MEDICINE

## 2018-03-14 RX ORDER — CIPROFLOXACIN 500 MG/1
500 TABLET, FILM COATED ORAL 2 TIMES DAILY
Qty: 20 TABLET | Refills: 0 | Status: SHIPPED | OUTPATIENT
Start: 2018-03-14 | End: 2018-04-04

## 2018-03-14 NOTE — MR AVS SNAPSHOT
"              After Visit Summary   3/14/2018    Giovana Key    MRN: [id number]           Patient Information     Date Of Birth          1950        Visit Information        Provider Department      3/14/2018 4:15 PM Nidia Pierce MD [facility]        Today's Diagnoses     Urinary frequency    -  1    Chronic nasal congestion        Acute pain of left knee        Acute cystitis without hematuria           Follow-ups after your visit        Additional Services     Referral to [facility]       Referral to [facility]  Phone: [phone]  Fax: [phone]  Reason for referral: MRI Left Knee                  Your next 10 appointments already scheduled     Apr 04, 2018  9:30 AM CDT   PHYSICAL with Nidia Pierce MD   [facility]    [address] [place] [address] [phone]              Who to contact     If you have questions or need follow up information about today's clinic visit or your schedule please contact [facility] directly at [phone].  Normal or non-critical lab and imaging results will be communicated to you by E-Signhart, letter or phone within 4 business days after the clinic has received the results. If you do not hear from us within 7 days, please contact the clinic through [employer] or phone. If you have a critical or abnormal lab result, we will notify you by phone as soon as possible.  Submit refill requests through [employer] or call your pharmacy and they will forward the refill request to us. Please allow 3 business days for your refill to be completed.          Additional Information About Your Visit        E-SignharIpselex Information     [employer] lets you send messages to your doctor, view your test results, renew your prescriptions, schedule appointments and more. To sign up, go to [email or web address] . Click on \"Log in\" on the left side of the screen, which will take you to the Welcome " "page. Then click on \"Sign up Now\" on the right side of the page.     You will be asked to enter the access code listed below, as well as some personal information. Please follow the directions to create your username and password.     Your access code is: [email or web address]  Expires: 2018 11:40 AM     Your access code will  in 90 days. If you need help or a new code, please call your [place] clinic or [phone].        Care EveryWhere ID     This is your Care EveryWhere ID. This could be used by other organizations to access your [place] medical records  [id number]        Your Vitals Were     Pulse Respirations Pulse Oximetry BMI (Body Mass Index)          57 16 97% 28.6 kg/m2         Blood Pressure from Last 3 Encounters:   18 128/80   18 130/76   17 138/72    Weight from Last 3 Encounters:   18 74.4 kg (164 lb)   18 74.8 kg (164 lb 12.8 oz)   17 74.4 kg (164 lb)              We Performed the Following     Referral to [facility]     Urinalysis w/reflex protein, bili ([facility])     Urine Culture  Routine ([facility])     X-ray lt knee 1 to 2 views*     X-ray Sinus complete G/E 3 vws          Today's Medication Changes          These changes are accurate as of 3/14/18 11:59 PM.  If you have any questions, ask your nurse or doctor.               Start taking these medicines.        Dose/Directions    ciprofloxacin 500 MG tablet   Commonly known as:  CIPRO   Used for:  Acute cystitis without hematuria, Chronic nasal congestion   Started by:  Nidia Pierce MD        Dose:  500 mg   Take 1 tablet (500 mg) by mouth 2 times daily   Quantity:  20 tablet   Refills:  0            Where to get your medicines      These medications were sent to [facility] [id number] [place] [address] & [facility]  [address] [place] [address]     Phone:  [phone]     ciprofloxacin 500 MG tablet                Primary Care Provider Office " Phone # Fax #    Nidia Zuri Pierce [phone]       [facility] [address]  [facility] [address]        Equal Access to Services     EUGENIO DONNELLY : Hadii aad ku hadrosao Soomaali, waaxda luqadaha, qaybta kaalmada adeegjorgeda, racheal barrioszaynab maher. So [facility] [phone].    ATENCIÓN: Si habla español, tiene a myers disposición servicios gratuitos de asistencia lingüística. Joaquinaame al [phone].    We comply with applicable federal civil rights laws and [place] laws. We do not discriminate on the basis of race, color, national origin, age, disability, sex, sexual orientation, or gender identity.            Thank you!     Thank you for choosing [facility]  for your care. Our goal is always to provide you with excellent care. Hearing back from our patients is one way we can continue to improve our services. Please take a few minutes to complete the written survey that you may receive in the mail after your visit with us. Thank you!             Your Updated Medication List - Protect others around you: Learn how to safely use, store and throw away your medicines at www.disposemymeds.org.          This list is accurate as of 3/14/18 11:59 PM.  Always use your most recent med list.                   Brand Name Dispense Instructions for use Diagnosis    ALIVE WOMENS GUMMY Chew      Take 1 chew tab by mouth daily        BIOTIN 5000 PO      Take 1 chew tab by mouth daily        ciprofloxacin 500 MG tablet    CIPRO    20 tablet    Take 1 tablet (500 mg) by mouth 2 times daily    Acute cystitis without hematuria, Chronic nasal congestion       COLACE PO      Take 50 mg by mouth as needed        CYTOMEL PO      Take 10 mcg by mouth daily 2  @ 5 mcg tablets = 10 mcg/d        eletriptan 20 MG tablet    RELPAX    18 tablet    Take 1-2 tablets (20-40 mg) by mouth at onset of headache for migraine May repeat in 2 hours. Max 4 tablets/24 hours.    Migraine with aura  and without status migrainosus, not intractable       SYNTHROID PO      Take 88 mcg by mouth daily        TESTOSTERONE IM      Inject  into the muscle See Admin Instructions.  q 2 weeks        UNABLE TO FIND      15 % daily MEDICATION NAME: progesterone cream Applies a 1/4 teaspoon daily    Diverticulitis of colon, Abdominal pain, left upper quadrant

## 2018-03-14 NOTE — PROGRESS NOTES
Problem(s) Oriented visit        SUBJECTIVE:                                                    Giovana Key is a 67 year old female who presents to clinic today for three issues.   She has urinary urgency and frequency, but with only small amounts voided at a time. No dysuria. No visible blood in the urine. She denies any back pain.    She had left OE and continues to have the sensation that it isn't quite right. She was given azithromycin and it improved but did not fully resolve. No fever, but she has felt warmer in the mornings. She has sensation of needing to clear her throat all the time.     She reports significant left knee pain. She notes certain movements that will bring on sudden terrible pain. She denies locking or instability, but has bad pain and absolutely cannot kneel in the knee.          Problem list, Medication list, Allergies, and Medical/Social/Surgical histories reviewed in EPIC and updated as appropriate.   Additional history: as documented    ROS:  5 point ROS completed and negative except noted above, including Gen, CV, Resp, GI, MS      Histories:   Patient Active Problem List   Diagnosis     Migraine     heel pain     Peroneal tendonitis     Pain in joint, ankle and foot     Other enthesopathy of ankle and tarsus     Health Care Home     Advanced directives, counseling/discussion     Diverticulitis of colon     Graves disease     Pharyngeal dysphagia     Past Surgical History:   Procedure Laterality Date     CHOLECYSTECTOMY       COSMETIC SURGERY      breast augmentation     ENDOSCOPIC RETROGRADE CHOLANGIOPANCREATOGRAM N/A 6/2/2017    Procedure: COMBINED ENDOSCOPIC RETROGRADE CHOLANGIOPANCREATOGRAPHY, SPHINCTEROTOMY;  ENDOSCOPIC RETROGRADE CHOLANGIOPANCREATOGRAM (ERCP) STONE REMOVAL, SPINCHTEROTOMY;  Surgeon: Preston Barnett MD;  Location:  OR     ENT SURGERY  age 26    tonsillectomy     ESOPHAGOSCOPY, GASTROSCOPY, DUODENOSCOPY (EGD), COMBINED N/A 5/23/2017    Procedure: COMBINED  ENDOSCOPIC ULTRASOUND, ESOPHAGOSCOPY, GASTROSCOPY, DUODENOSCOPY (EGD);  ENDOSCOPIC ULTRASOUND, ESOPHAGOSCOPY, GASTROSCOPY, DUODENOSCOPY (EGD) (MAC SEDATION) ;  Surgeon: Dot Vargas MD;  Location:  GI     ESOPHAGOSCOPY, GASTROSCOPY, DUODENOSCOPY (EGD), COMBINED N/A 5/23/2017    Procedure: COMBINED ESOPHAGOSCOPY, GASTROSCOPY, DUODENOSCOPY (EGD), BIOPSY SINGLE OR MULTIPLE;;  Surgeon: Dot Vargas MD;  Location:  GI     GYN SURGERY      tubal ligation x2     HYSTERECTOMY VAGINAL  1997    Dr Grier       Social History   Substance Use Topics     Smoking status: Former Smoker     Types: Cigarettes     Quit date: 1/1/1983     Smokeless tobacco: Never Used     Alcohol use 0.0 oz/week     0 Standard drinks or equivalent per week      Comment: occasionally     Family History   Problem Relation Age of Onset     Hypertension Other            OBJECTIVE:                                                    /80  Pulse 57  Resp 16  Wt 74.4 kg (164 lb)  SpO2 97%  BMI 28.6 kg/m2  Body mass index is 28.6 kg/(m^2).   GENERAL APPEARANCE: Alert, no acute distress  HENT: right TM normal, left TM normal, nose clear rhinorrhea, mucosal edema, throat/mouth:mild erythema, mucous membranes moist  NECK: No adenopathy,masses or thyromegaly  RESP: lungs clear to auscultation   CV: normal rate, regular rhythm, no murmur or gallop  ABDOMEN: soft, no organomegaly, masses or tenderness  LYMPHATICS: No cervical, supraclavicular or inguinal adenopathy  MS: left knee: no  Quad pain or patellar pain. There is pain with ROM with varus and valgus strain. No apparent effusion. No appreciable ligamentous instability.  NEURO: Alert, oriented, speech and mentation normal  PSYCH: mentation appears normal, affect and mood normal  Xray of the left knee is unremarkable.   Labs Resulted Today:   Results for orders placed or performed in visit on 03/14/18   Urinalysis w/reflex protein, bili ([facility])   Result Value Ref Range     Color Urine Yellow     pH Urine 5.5 5 - 9 pH    Specific Gravity Urine 1.020 1.005 - 1.025    Protein Urine 0 0.01 mg/dL    Glucose Urine 0     Ketones Urine 0     Leukocyte Esterase Urine 0     Blood Urine 2+ (A)     Nitrite Urine Neg NEG    Bilirubin Urine Dip 0     Urobilinogen Urine 0.2 0.2 - 1.0 EU/dL    WBC Urine 0 0 - 3    RBC Urine 0-3 0 - 3    Epithelial Cells rare     Crystal Urine 0     Bacteria Urine rare     Mucous Urine 0     Casts/LPF 0    Urine Culture  Routine ([facility])   Result Value Ref Range    Urine Culture Final report     Result 1 No growth     Narrative    Performed at:  01 - [facility]  [address] [place], [place]  [id number]  : Dallin Friedman MD, Phone:  [phone]     ASSESSMENT/PLAN:                                                        Giovana was seen today for recheck and uti.    Diagnoses and all orders for this visit:    Urinary frequency  -     Urinalysis w/reflex protein, bili ([facility])  -     Urine Culture  Routine ([facility])    Chronic nasal congestion, probable sinusitis  -     X-ray Sinus complete G/E 3 vws  -     ciprofloxacin (CIPRO) 500 MG tablet; Take 1 tablet (500 mg) by mouth 2 times daily  Recommend Mucinex, plenty of fluids, and f/u if failure to resolve.    Acute pain of left knee, etiology uncertain.  -     X-ray lt knee 1 to 2 views* unremarlable  -     Referral to [facility] for MRI of the knee. F/U pending results. Anticipate either referral to Ortho or PT.    Acute cystitis without hematuria  -     ciprofloxacin (CIPRO) 500 MG tablet; Take 1 tablet (500 mg) by mouth 2 times daily  Follow up if failure to resolve.      There are no Patient Instructions on file for this visit.    The following health maintenance items are reviewed in Epic and correct as of today:  Health Maintenance   Topic Date Due     MIGRAINE ACTION PLAN  11/07/1969     PNEUMOCOCCAL (2 of 2 - PPSV23) 01/04/2018     INFLUENZA VACCINE (SYSTEM ASSIGNED)  09/01/2018     FALL RISK  ASSESSMENT  11/29/2018     MAMMO SCREEN Q2 YR (SYSTEM ASSIGNED)  11/29/2019     ADVANCE DIRECTIVE PLANNING Q5 YRS  07/31/2020     TETANUS IMMUNIZATION (SYSTEM ASSIGNED)  04/07/2021     LIPID SCREEN Q5 YR FEMALE (SYSTEM ASSIGNED)  01/05/2022     COLONOSCOPY Q10 YR  01/17/2027     DEXA SCAN SCREENING (SYSTEM ASSIGNED)  Completed     HEPATITIS C SCREENING  Completed       Nidia Pierce MD  [facility]  Family Practice  [facility]  [phone]    For any issues my office # is [phone]

## 2018-03-14 NOTE — LETTER
[facility] [address] [place]  [address]  Phone: [phone]    March 20, 2018      Giovana Key  [address]  [facility] [address]              Dear Giovana,    The results from your recent visit showed No growth in your urine culture, confirming no UTI.        Sincerely,     Nidia Pierce M.D.    Results for orders placed or performed in visit on 03/14/18   Urinalysis w/reflex protein, bili ([facility])   Result Value Ref Range    Color Urine Yellow     pH Urine 5.5 5 - 9 pH    Specific Gravity Urine 1.020 1.005 - 1.025    Protein Urine 0 0.01 mg/dL    Glucose Urine 0     Ketones Urine 0     Leukocyte Esterase Urine 0     Blood Urine 2+ (A)     Nitrite Urine Neg NEG    Bilirubin Urine Dip 0     Urobilinogen Urine 0.2 0.2 - 1.0 EU/dL    WBC Urine 0 0 - 3    RBC Urine 0-3 0 - 3    Epithelial Cells rare     Crystal Urine 0     Bacteria Urine rare     Mucous Urine 0     Casts/LPF 0    Urine Culture  Routine ([facility])   Result Value Ref Range    Urine Culture Final report     Result 1 No growth     Narrative    Performed at:  01 - [facility]  [address] [place], [place]  [id number]  : Dallin Friedman MD, Phone:  [phone]

## 2018-03-17 LAB
Lab: NO GROWTH
URINE CULTURE: NORMAL

## 2018-03-20 ENCOUNTER — TRANSFERRED RECORDS (OUTPATIENT)
Dept: FAMILY MEDICINE | Facility: CLINIC | Age: 68
End: 2018-03-20

## 2018-03-23 ENCOUNTER — TELEPHONE (OUTPATIENT)
Dept: FAMILY MEDICINE | Facility: CLINIC | Age: 68
End: 2018-03-23

## 2018-03-23 DIAGNOSIS — M25.562 LEFT KNEE PAIN: Primary | ICD-10-CM

## 2018-03-23 NOTE — TELEPHONE ENCOUNTER
----- Message from Nidia Pierce MD sent at 3/21/2018  6:16 PM CDT -----  Recommend referral to Ortho to discuss treatment options.

## 2018-03-23 NOTE — TELEPHONE ENCOUNTER
Patient informed of knee MRI result and Dr. Pierce's recommendation she discuss with ortho. Patient given [facility] contact info to schedule appt.  Chika Samuel RN

## 2018-04-04 ENCOUNTER — OFFICE VISIT (OUTPATIENT)
Dept: FAMILY MEDICINE | Facility: CLINIC | Age: 68
End: 2018-04-04

## 2018-04-04 VITALS
WEIGHT: 164.2 LBS | HEART RATE: 60 BPM | HEIGHT: 63 IN | BODY MASS INDEX: 29.09 KG/M2 | DIASTOLIC BLOOD PRESSURE: 72 MMHG | SYSTOLIC BLOOD PRESSURE: 124 MMHG

## 2018-04-04 DIAGNOSIS — G43.109 MIGRAINE WITH AURA AND WITHOUT STATUS MIGRAINOSUS, NOT INTRACTABLE: ICD-10-CM

## 2018-04-04 DIAGNOSIS — Z23 NEED FOR PNEUMOCOCCAL VACCINE: ICD-10-CM

## 2018-04-04 DIAGNOSIS — J32.9 CHRONIC CONGESTION OF PARANASAL SINUS: ICD-10-CM

## 2018-04-04 DIAGNOSIS — L65.9 HAIR LOSS: ICD-10-CM

## 2018-04-04 DIAGNOSIS — Z00.00 ROUTINE GENERAL MEDICAL EXAMINATION AT A HEALTH CARE FACILITY: Primary | ICD-10-CM

## 2018-04-04 LAB
% GRANULOCYTES: 55.2 % (ref 42.2–75.2)
HCT VFR BLD AUTO: 41.3 % (ref 35–46)
HEMOGLOBIN: 13.6 G/DL (ref 11.8–15.5)
LYMPHOCYTES NFR BLD AUTO: 35.2 % (ref 20.5–51.1)
MCH RBC QN AUTO: 32.3 PG (ref 27–31)
MCHC RBC AUTO-ENTMCNC: 33 G/DL (ref 33–37)
MCV RBC AUTO: 97.9 FL (ref 80–100)
MONOCYTES NFR BLD AUTO: 9.6 % (ref 1.7–9.3)
PLATELET # BLD AUTO: 184 K/UL (ref 140–450)
RBC # BLD AUTO: 4.22 X10/CMM (ref 3.7–5.2)
WBC # BLD AUTO: 5.3 X10/CMM (ref 3.8–11)

## 2018-04-04 PROCEDURE — 80061 LIPID PANEL: CPT | Mod: 90 | Performed by: FAMILY MEDICINE

## 2018-04-04 PROCEDURE — 99213 OFFICE O/P EST LOW 20 MIN: CPT | Mod: 25 | Performed by: FAMILY MEDICINE

## 2018-04-04 PROCEDURE — 80048 BASIC METABOLIC PNL TOTAL CA: CPT | Mod: 90 | Performed by: FAMILY MEDICINE

## 2018-04-04 PROCEDURE — 85025 COMPLETE CBC W/AUTO DIFF WBC: CPT | Performed by: FAMILY MEDICINE

## 2018-04-04 PROCEDURE — G0009 ADMIN PNEUMOCOCCAL VACCINE: HCPCS | Performed by: FAMILY MEDICINE

## 2018-04-04 PROCEDURE — 99397 PER PM REEVAL EST PAT 65+ YR: CPT | Mod: 25 | Performed by: FAMILY MEDICINE

## 2018-04-04 PROCEDURE — 90732 PPSV23 VACC 2 YRS+ SUBQ/IM: CPT | Performed by: FAMILY MEDICINE

## 2018-04-04 PROCEDURE — 36415 COLL VENOUS BLD VENIPUNCTURE: CPT | Performed by: FAMILY MEDICINE

## 2018-04-04 NOTE — PROGRESS NOTES
SUBJECTIVE:   Giovana Key is a 67 year old female who presents for Preventive Visit.      Are you in the first 12 months of your Medicare Part B coverage?      Healthy Habits:    Do you get at least three servings of calcium containing foods daily (dairy, green leafy vegetables, etc.)? yes    Amount of exercise or daily activities, outside of work: 1 day(s) per week, boxing, but has knee issue that was evaluated by ortho.    Problems taking medications regularly No    Medication side effects: No    Have you had an eye exam in the past two years? yes    Do you see a dentist twice per year? yes    Do you have sleep apnea, excessive snoring or daytime drowsiness?no      Ability to successfully perform activities of daily living: Yes, no assistance needed    Home safety:  none identified     Hearing impairment: No  HEARING FREQUENCY:   Right Ear:  500 Hz: PASS   1000 Hz: PASS   2000 Hz: PASS   4000 Hz: PASS  Left Ear:  500 Hz: PASS   1000 Hz: PASS   2000 Hz: PASS   4000 Hz: PASS  Scot Sheriff LPN 10/18/2017 11:56 AM         COGNITIVE SCREEN  1) Repeat 3 items (Banana, Sunrise, Chair)    2) Clock draw: NORMAL  3) 3 item recall: Recalls 3 objects  Results: 3 items recalled: COGNITIVE IMPAIRMENT LESS LIKELY    Mini-CogTM Copyright S Luis. Licensed by the author for use in [facility]; reprinted with permission (lisa@.[facility]). All rights reserved.        PROBLEMS TO ADD ON...    Migraine Headaches: recently getting a couple of migraines weekly when her sinuses were acting up, now sinuses are better and the frequency of the migraine has decreased. She also gets migraines with weather change. She treats with Relpax 1-2 times monthly on average. Depending on when she catches her headache she tries to take only 1 tab, not typically needing two tabs. She gets visual symptoms as aura, and also vision loss with the migraines.     Chronic Left paranasal sinus pressure: would like to see ENT for better  evaluation. Typically has pressure and occasional pain in the left maxillary area and extending into the midline.     Hair Loss: Giovana reports that over the past year or so she has had progressive loss of hair in the front. She always wears a wide headband so that it isn't visible, but makes sure it isnt tight. She never blow dries her hair and only use a wide tooth comb in order to not damage any more of it. She has not yet mentioned this to her endocrinologist. She thinks that when her thyroid replacement was altered a bit she started to see a small amount of new hair growth.     Reviewed and updated as needed this visit by clinical staff  Allergies  Meds         Reviewed and updated as needed this visit by Provider        Social History   Substance Use Topics     Smoking status: Former Smoker     Types: Cigarettes     Quit date: 1/1/1983     Smokeless tobacco: Never Used     Alcohol use 0.0 oz/week     0 Standard drinks or equivalent per week      Comment: occasionally       If you drink alcohol do you typically have >3 drinks per day or >7 drinks per week? No                        Today's PHQ-2 Score:   PHQ-2 ( 1999 Pfizer) 9/24/2013 9/24/2013   Q1: Little interest or pleasure in doing things 0 0   Q2: Feeling down, depressed or hopeless 0 0   PHQ-2 Score 0 0       Do you feel safe in your environment - Yes    Do you have a Health Care Directive?: Yes: Advance Directive has been received and scanned.    Current providers sharing in care for this patient include:   Patient Care Team:  Nidia Pierce MD as PCP - General (Family Practice)    The following health maintenance items are reviewed in Epic and correct as of today:  Health Maintenance   Topic Date Due     MIGRAINE ACTION PLAN  11/07/1969     PNEUMOCOCCAL (2 of 2 - PPSV23) 01/04/2018     INFLUENZA VACCINE (SYSTEM ASSIGNED)  09/01/2018     FALL RISK ASSESSMENT  11/29/2018     MAMMO SCREEN Q2 YR (SYSTEM ASSIGNED)  11/29/2019     ADVANCE DIRECTIVE  PLANNING Q5 YRS  07/31/2020     TETANUS IMMUNIZATION (SYSTEM ASSIGNED)  04/07/2021     LIPID SCREEN Q5 YR FEMALE (SYSTEM ASSIGNED)  01/05/2022     COLONOSCOPY Q10 YR  01/17/2027     DEXA SCAN SCREENING (SYSTEM ASSIGNED)  Completed     HEPATITIS C SCREENING  Completed     Labs reviewed in EPIC  BP Readings from Last 3 Encounters:   03/14/18 128/80   02/20/18 130/76   11/29/17 138/72    Wt Readings from Last 3 Encounters:   03/14/18 74.4 kg (164 lb)   02/20/18 74.8 kg (164 lb 12.8 oz)   11/29/17 74.4 kg (164 lb)                  Patient Active Problem List   Diagnosis     Migraine     heel pain     Peroneal tendonitis     Pain in joint, ankle and foot     Other enthesopathy of ankle and tarsus     Health Care Home     Advanced directives, counseling/discussion     Diverticulitis of colon     Graves disease     Pharyngeal dysphagia     Past Surgical History:   Procedure Laterality Date     CHOLECYSTECTOMY       COSMETIC SURGERY      breast augmentation     ENDOSCOPIC RETROGRADE CHOLANGIOPANCREATOGRAM N/A 6/2/2017    Procedure: COMBINED ENDOSCOPIC RETROGRADE CHOLANGIOPANCREATOGRAPHY, SPHINCTEROTOMY;  ENDOSCOPIC RETROGRADE CHOLANGIOPANCREATOGRAM (ERCP) STONE REMOVAL, SPINCHTEROTOMY;  Surgeon: Preston Barnett MD;  Location:  OR     ENT SURGERY  age 26    tonsillectomy     ESOPHAGOSCOPY, GASTROSCOPY, DUODENOSCOPY (EGD), COMBINED N/A 5/23/2017    Procedure: COMBINED ENDOSCOPIC ULTRASOUND, ESOPHAGOSCOPY, GASTROSCOPY, DUODENOSCOPY (EGD);  ENDOSCOPIC ULTRASOUND, ESOPHAGOSCOPY, GASTROSCOPY, DUODENOSCOPY (EGD) (MAC SEDATION) ;  Surgeon: Dot Vargas MD;  Location:  GI     ESOPHAGOSCOPY, GASTROSCOPY, DUODENOSCOPY (EGD), COMBINED N/A 5/23/2017    Procedure: COMBINED ESOPHAGOSCOPY, GASTROSCOPY, DUODENOSCOPY (EGD), BIOPSY SINGLE OR MULTIPLE;;  Surgeon: Dot Vargas MD;  Location:  GI     GYN SURGERY      tubal ligation x2     HYSTERECTOMY VAGINAL  1997    Dr Grier       Social History    Substance Use Topics     Smoking status: Former Smoker     Types: Cigarettes     Quit date: 1/1/1983     Smokeless tobacco: Never Used     Alcohol use 0.0 oz/week     0 Standard drinks or equivalent per week      Comment: occasionally     Family History   Problem Relation Age of Onset     Hypertension Other          Current Outpatient Prescriptions   Medication Sig Dispense Refill     Pediatric Multivit-Minerals-C (MULTIVITAMINS PEDIATRIC PO) Take 2 tablets by mouth daily       eletriptan (RELPAX) 20 MG tablet Take 1-2 tablets (20-40 mg) by mouth at onset of headache for migraine May repeat in 2 hours. Max 4 tablets/24 hours. 18 tablet 1     Docusate Sodium (COLACE PO) Take 50 mg by mouth as needed        BIOTIN 5000 PO Take 1 chew tab by mouth daily       Levothyroxine Sodium (SYNTHROID PO) Take 88 mcg by mouth daily       UNABLE TO FIND 15 % daily MEDICATION NAME: progesterone cream  Applies a 1/4 teaspoon daily       Liothyronine Sodium (CYTOMEL PO) Take 10 mcg by mouth daily 2  @ 5 mcg tablets = 10 mcg/d       TESTOSTERONE IM Inject 0.2 mLs into the muscle See Admin Instructions  q 2 weeks       Allergies   Allergen Reactions     Imitrex [Sumatriptan] Cramps     Codeine GI Disturbance     Doxycycline Nausea     Lactose Diarrhea     Orange Fruit [Citrus] Dermatitis     Cold sores     Strawberries [Strawberry]      Throat itching     Augmentin Hives and Itching     Metronidazole Hives and Itching     Recent Labs   Lab Test  02/18/17   1940  01/05/17   0849   02/29/16   1353   02/16/16   1552   LDL   --   101*   --    --    --    --    HDL   --   50   --    --    --    --    TRIG   --   140   --    --    --    --    ALT   --   32   --   16   --   68*   CR  0.85  0.90   < >   --    < >  0.88   GFRESTIMATED  67  63   --    --    < >   --    GFRESTBLACK  81  76   --    --    < >   --    POTASSIUM  3.3*  3.7   < >   --    < >  4.1   TSH  2.19   --    --    --    --    --     < > = values in this interval not  "displayed.            ROS:  Constitutional, HEENT, cardiovascular, pulmonary, GI, , musculoskeletal, neuro, skin, endocrine and psych systems are negative, except as otherwise noted.    OBJECTIVE:   There were no vitals taken for this visit. Estimated body mass index is 28.6 kg/(m^2) as calculated from the following:    Height as of 11/29/17: 1.613 m (5' 3.5\").    Weight as of 3/14/18: 74.4 kg (164 lb).  EXAM:   GENERAL APPEARANCE: healthy, alert and no distress  EYES: Eyes grossly normal to inspection, PERRL and conjunctivae and sclerae normal  HENT: ear canals and TM's normal, nose and mouth without ulcers or lesions, oropharynx clear and oral mucous membranes moist  NECK: no adenopathy, no asymmetry, masses, or scars and thyroid normal to palpation  RESP: lungs clear to auscultation - no rales, rhonchi or wheezes  BREAST: normal without masses, tenderness or nipple discharge and no palpable axillary masses or adenopathy, hair growth on the breasts is noted.  CV: regular rate and rhythm, normal S1 S2, no S3 or S4, no murmur, click or rub, no peripheral edema and peripheral pulses strong  ABDOMEN: soft, nontender, no hepatosplenomegaly, no masses and bowel sounds normal  MS: no musculoskeletal defects are noted and gait is age appropriate without ataxia  SKIN: no suspicious lesions or rashes  SKIN: hair quality: male pattern baldness is noted with frontal recession of hairline by several centimeters, no evidence of hair shaft breakage.  NEURO: Normal strength and tone, sensory exam grossly normal, mentation intact and speech normal  PSYCH: mentation appears normal and affect normal/bright    ASSESSMENT / PLAN:   Giovana was seen today for medicare visit.    Diagnoses and all orders for this visit:    Routine general medical examination at a health care facility  -     Lipid Panel ([facility])  -     Basic Metabolic Panel (8) ([facility])  -     CBC with Diff/Plt ([facility])  She is a healthy appearing 67 year old female. " "    Chronic congestion of paranasal sinus  Referral to ENT is given as her chronic sinus congestion is often the trigger for her migraine headaches.    Migraine with aura and without status migrainosus, not intractable  Recommend trial of Excedrin Migraine at first sign of Migraine aura, then reserve Relpax for when headaches progress.    Hair loss  Anticipate this is related to hormone imbalance and she is referred back to her endocrinologist for this. Is endocrine does not find reason, she will seek help with dermatology.    Need for pneumococcal vaccine  -     PNEUMOCOCCAL VACCINE,ADULT,SQ OR IM  -     ADMIN PNEUMOCOCCAL VACCINE        End of Life Planning:  Patient currently has an advanced directive: No.  I have verified the patient's ablity to prepare an advanced directive/make health care decisions.  Literature was provided to assist patient in preparing an advanced directive.    COUNSELING:  Reviewed preventive health counseling, as reflected in patient instructions       Regular exercise       Healthy diet/nutrition       Vision screening       Hearing screening       Dental care       Immunizations    Vaccinated for: Pneumococcal             Osteoporosis Prevention/Bone Health       Colon cancer screening        Estimated body mass index is 28.6 kg/(m^2) as calculated from the following:    Height as of 11/29/17: 1.613 m (5' 3.5\").    Weight as of 3/14/18: 74.4 kg (164 lb).       reports that she quit smoking about 35 years ago. Her smoking use included Cigarettes. She has never used smokeless tobacco.      Appropriate preventive services were discussed with this patient, including applicable screening as appropriate for cardiovascular disease, diabetes, osteopenia/osteoporosis, and glaucoma.  As appropriate for age/gender, discussed screening for colorectal cancer, prostate cancer, breast cancer, and cervical cancer. Checklist reviewing preventive services available has been given to the " patient.    Reviewed patients plan of care and provided an AVS. The Basic Care Plan (routine screening as documented in Health Maintenance) for Giovana meets the Care Plan requirement. This Care Plan has been established and reviewed with the Patient.    Counseling Resources:  ATP IV Guidelines  Pooled Cohorts Equation Calculator  Breast Cancer Risk Calculator  FRAX Risk Assessment  ICSI Preventive Guidelines  Dietary Guidelines for Americans, 2010  USDA's MyPlate  ASA Prophylaxis  Lung CA Screening    Nidia Pierce MD  [facility]

## 2018-04-04 NOTE — MR AVS SNAPSHOT
After Visit Summary   4/4/2018    Giovana Key    MRN: [id number]           Patient Information     Date Of Birth          1950        Visit Information        Provider Department      4/4/2018 9:30 AM Nidia Pierce MD [facility]        Today's Diagnoses     Routine general medical examination at a health care facility    -  1    Chronic congestion of paranasal sinus        Migraine with aura and without status migrainosus, not intractable        Hair loss        Need for pneumococcal vaccine          Care Instructions      Preventive Health Recommendations    Female Ages 65 +    Yearly exam:     See your health care provider every year in order to  o Review health changes.   o Discuss preventive care.    o Review your medicines if your doctor has prescribed any.      You no longer need a yearly Pap test unless you've had an abnormal Pap test in the past 10 years. If you have vaginal symptoms, such as bleeding or discharge, be sure to talk with your provider about a Pap test.      Every 1 to 2 years, have a mammogram.  If you are over 69, talk with your health care provider about whether or not you want to continue having screening mammograms.      Every 10 years, have a colonoscopy. Or, have a yearly FIT test (stool test). These exams will check for colon cancer.       Have a cholesterol test every 5 years, or more often if your doctor advises it.       Have a diabetes test (fasting glucose) every three years. If you are at risk for diabetes, you should have this test more often.       At age 65, have a bone density scan (DEXA) to check for osteoporosis (brittle bone disease).    Shots:    Get a flu shot each year.    Get a tetanus shot every 10 years.    Talk to your doctor about your pneumonia vaccines. There are now two you should receive - Pneumovax (PPSV 23) and Prevnar (PCV 13).    Talk to your doctor about the shingles vaccine.    Talk to your doctor about the  "hepatitis B vaccine.    Nutrition:     Eat at least 5 servings of fruits and vegetables each day.      Eat whole-grain bread, whole-wheat pasta and brown rice instead of white grains and rice.      Talk to your provider about Calcium and Vitamin D.     Lifestyle    Exercise at least 150 minutes a week (30 minutes a day, 5 days a week). This will help you control your weight and prevent disease.      Limit alcohol to one drink per day.      No smoking.       Wear sunscreen to prevent skin cancer.       See your dentist twice a year for an exam and cleaning.      See your eye doctor every 1 to 2 years to screen for conditions such as glaucoma, macular degeneration and cataracts.          Follow-ups after your visit        Who to contact     If you have questions or need follow up information about today's clinic visit or your schedule please contact [facility] directly at [phone].  Normal or non-critical lab and imaging results will be communicated to you by EventRegisthart, letter or phone within 4 business days after the clinic has received the results. If you do not hear from us within 7 days, please contact the clinic through EventRegisthart or phone. If you have a critical or abnormal lab result, we will notify you by phone as soon as possible.  Submit refill requests through [employer] or call your pharmacy and they will forward the refill request to us. Please allow 3 business days for your refill to be completed.          Additional Information About Your Visit        [employer] Information     [employer] lets you send messages to your doctor, view your test results, renew your prescriptions, schedule appointments and more. To sign up, go to [email or web address] . Click on \"Log in\" on the left side of the screen, which will take you to the Welcome page. Then click on \"Sign up Now\" on the right side of the page.     You will be asked to enter the access code listed below, as well as some personal information. " "Please follow the directions to create your username and password.     Your access code is: [email or web address]  Expires: 2018 11:40 AM     Your access code will  in 90 days. If you need help or a new code, please call your [facility] clinic or [phone].        Care EveryWhere ID     This is your Care EveryWhere ID. This could be used by other organizations to access your [facility] medical records  [id number]        Your Vitals Were     Pulse Height Breastfeeding? BMI (Body Mass Index)          60 1.6 m (5' 3\") No 29.09 kg/m2         Blood Pressure from Last 3 Encounters:   18 124/72   18 128/80   18 130/76    Weight from Last 3 Encounters:   18 74.5 kg (164 lb 3.2 oz)   18 74.4 kg (164 lb)   18 74.8 kg (164 lb 12.8 oz)              We Performed the Following     ADMIN PNEUMOCOCCAL VACCINE     Basic Metabolic Panel (8) ([facility])     CBC with Diff/Plt ([facility])     Lipid Panel ([facility])     PNEUMOCOCCAL VACCINE,ADULT,SQ OR IM          Today's Medication Changes          These changes are accurate as of 18  7:48 PM.  If you have any questions, ask your nurse or doctor.               Stop taking these medicines if you haven't already. Please contact your care team if you have questions.     ALIVE WOMENS GUMMY Chew   Stopped by:  Nidia Pierce MD                    Primary Care Provider Office Phone # Fax #    Nidia Pierce [phone] [address] [place] [address]        Equal Access to Services     [facility] : Hadlee Whittington, waaxda lucarmen, qaybta carlos manuelalracheal enriquez. So [facility] [phone].    ATENCIÓN: Si habla español, tiene a myers disposición servicios gratuitos de asistencia lingüística. Llame al [phone].    We comply with applicable federal civil rights laws and [place] laws. We do not discriminate on the basis of race, color, national origin, age, " disability, sex, sexual orientation, or gender identity.            Thank you!     Thank you for choosing [facility]  for your care. Our goal is always to provide you with excellent care. Hearing back from our patients is one way we can continue to improve our services. Please take a few minutes to complete the written survey that you may receive in the mail after your visit with us. Thank you!             Your Updated Medication List - Protect others around you: Learn how to safely use, store and throw away your medicines at www.disposemymeds.org.          This list is accurate as of 4/4/18  7:48 PM.  Always use your most recent med list.                   Brand Name Dispense Instructions for use Diagnosis    BIOTIN 5000 PO      Take 1 chew tab by mouth daily        COLACE PO      Take 50 mg by mouth as needed        CYTOMEL PO      Take 10 mcg by mouth daily 2  @ 5 mcg tablets = 10 mcg/d        eletriptan 20 MG tablet    RELPAX    18 tablet    Take 1-2 tablets (20-40 mg) by mouth at onset of headache for migraine May repeat in 2 hours. Max 4 tablets/24 hours.    Migraine with aura and without status migrainosus, not intractable       MULTIVITAMINS PEDIATRIC PO      Take 2 tablets by mouth daily        SYNTHROID PO      Take 88 mcg by mouth daily        TESTOSTERONE IM      Inject 0.2 mLs into the muscle See Admin Instructions  q 2 weeks        UNABLE TO FIND      15 % daily MEDICATION NAME: progesterone cream Applies a 1/4 teaspoon daily    Diverticulitis of colon, Abdominal pain, left upper quadrant

## 2018-04-04 NOTE — LETTER
[facility] [address] [place] [address] [phone]      April 4, 2018      RE:  Giovana Key  [address]  [facility] [address]            Giovana has been seen here for right knee pain. She needed to stop her boxing routine as this heals. She was seen here initially 3/14/2018. She had MRI and follow up with orthopedics. Please allow her to put her membership on hold for this time, anticipate she will be able to resume normal activity in about 4 weeks.        Sincerely,        Nidia Pierce M.D.

## 2018-04-04 NOTE — LETTER
[facility] [address] [place]  [address]  Phone: [phone]    April 5, 2018      Giovana Key  [address]  [facility] [address]              Dear Giovana,    The results from your recent visit showed that your   Blood counts are normal.   Cholesterol panel is good.   Normal glucose, electrolytes, and kidney function.     Sincerely,     Nidia Pierce M.D.    Results for orders placed or performed in visit on 04/04/18   Lipid Panel ([facility])   Result Value Ref Range    Cholesterol 183 100 - 199 mg/dL    Triglycerides 130 0 - 149 mg/dL    HDL Cholesterol 50 >39 mg/dL    VLDL Cholesterol [place] 26 5 - 40 mg/dL    LDL Cholesterol Calculated 107 (H) 0 - 99 mg/dL    LDL/HDL Ratio 2.1 0.0 - 3.2 ratio    Narrative    Performed at:  01 - [facility] [address], [place], [place]  [id number]  : Dallin Friedman MD, Phone:  [phone]   Basic Metabolic Panel (8) ([facility])   Result Value Ref Range    Glucose 98 65 - 99 mg/dL    Urea Nitrogen 10 8 - 27 mg/dL    Creatinine 0.69 0.57 - 1.00 mg/dL    eGFR If NonAfricn Am 91 >59 mL/min/1.73    eGFR If Africn Am 105 >59 mL/min/1.73    BUN/Creatinine Ratio 14 12 - 28    Sodium 143 134 - 144 mmol/L    Potassium 4.1 3.5 - 5.2 mmol/L    Chloride 105 96 - 106 mmol/L    Total CO2 25 18 - 28 mmol/L    Calcium 9.0 8.7 - 10.3 mg/dL    Narrative    Performed at:  01 - [facility] [address], [place], [place]  [id number]  : Dallin Friedman MD, Phone:  [phone]   CBC with Diff/Plt ([facility])   Result Value Ref Range    WBC x10/cmm 5.3 3.8 - 11.0 x10/cmm    % Lymphocytes 35.2 20.5 - 51.1 %    % Monocytes 9.6 (A) 1.7 - 9.3 %    % Granulocytes 55.2 42.2 - 75.2 %    RBC x10/cmm 4.22 3.7 - 5.2 x10/cmm    Hemoglobin 13.6 11.8 - 15.5 g/dl    Hematocrit 41.3 35 - 46 %    MCV 97.9 80 - 100 fL    MCH 32.3 (A) 27.0 - 31.0 pg    MCHC 33.0 33.0 - 37.0 g/dL    Platelet Count 184 140 - 450 K/uL

## 2018-04-05 LAB
BUN SERPL-MCNC: 10 MG/DL (ref 8–27)
BUN/CREATININE RATIO: 14 (ref 12–28)
CALCIUM SERPL-MCNC: 9 MG/DL (ref 8.7–10.3)
CHLORIDE SERPLBLD-SCNC: 105 MMOL/L (ref 96–106)
CHOLEST SERPL-MCNC: 183 MG/DL (ref 100–199)
CREAT SERPL-MCNC: 0.69 MG/DL (ref 0.57–1)
EGFR IF AFRICN AM: 105 ML/MIN/1.73
EGFR IF NONAFRICN AM: 91 ML/MIN/1.73
GLUCOSE SERPL-MCNC: 98 MG/DL (ref 65–99)
HDLC SERPL-MCNC: 50 MG/DL
LDL/HDL RATIO: 2.1 RATIO (ref 0–3.2)
LDLC SERPL CALC-MCNC: 107 MG/DL (ref 0–99)
POTASSIUM SERPL-SCNC: 4.1 MMOL/L (ref 3.5–5.2)
SODIUM SERPL-SCNC: 143 MMOL/L (ref 134–144)
TOTAL CO2: 25 MMOL/L (ref 18–28)
TRIGL SERPL-MCNC: 130 MG/DL (ref 0–149)
VLDLC SERPL CALC-MCNC: 26 MG/DL (ref 5–40)

## 2018-08-07 ENCOUNTER — OFFICE VISIT (OUTPATIENT)
Dept: FAMILY MEDICINE | Facility: CLINIC | Age: 68
End: 2018-08-07

## 2018-08-07 VITALS
SYSTOLIC BLOOD PRESSURE: 126 MMHG | DIASTOLIC BLOOD PRESSURE: 74 MMHG | TEMPERATURE: 98.1 F | BODY MASS INDEX: 28.98 KG/M2 | HEART RATE: 60 BPM | WEIGHT: 163.6 LBS | RESPIRATION RATE: 12 BRPM

## 2018-08-07 DIAGNOSIS — J32.0 CHRONIC MAXILLARY SINUSITIS: ICD-10-CM

## 2018-08-07 DIAGNOSIS — R10.9 LEFT FLANK PAIN: Primary | ICD-10-CM

## 2018-08-07 LAB
BACTERIA URINE: 0
BILIRUB UR QL STRIP: 0
BLOOD URINE DIP: ABNORMAL
CASTS/LPF: 0
COLOR UR: YELLOW
CRYSTAL URINE: 0
EPITHELIAL CELLS - QUEST: ABNORMAL
GLUCOSE UR STRIP-MCNC: 0 MG/DL
KETONES UR QL STRIP: 0
LEUKOCYTE ESTERASE URINE DIP: ABNORMAL
MUCOUS URINE: 0
NITRITE UR QL STRIP: ABNORMAL
PH UR STRIP: 6 PH (ref 5–9)
PROT UR QL: 0 MG/DL (ref ?–0.01)
RBC URINE: ABNORMAL (ref 0–3)
SP GR UR STRIP: 1.01 (ref 1–1.02)
UROBILINOGEN UR QL STRIP: 1 EU/DL (ref 0.2–1)
WBC URINE: 0 (ref 0–3)

## 2018-08-07 PROCEDURE — 81003 URINALYSIS AUTO W/O SCOPE: CPT | Performed by: NURSE PRACTITIONER

## 2018-08-07 PROCEDURE — 99213 OFFICE O/P EST LOW 20 MIN: CPT | Performed by: NURSE PRACTITIONER

## 2018-08-07 RX ORDER — FLUTICASONE PROPIONATE 50 MCG
1-2 SPRAY, SUSPENSION (ML) NASAL DAILY
Qty: 1 BOTTLE | Refills: 3 | Status: SHIPPED | OUTPATIENT
Start: 2018-08-07 | End: 2019-02-13

## 2018-08-07 ASSESSMENT — PAIN SCALES - GENERAL: PAINLEVEL: MODERATE PAIN (5)

## 2018-08-07 NOTE — MR AVS SNAPSHOT
"              After Visit Summary   8/7/2018    Giovana Key    MRN: [id number]           Patient Information     Date Of Birth          1950        Visit Information        Provider Department      8/7/2018 9:00 AM Sada Mayes APRN CNP [facility]        Today's Diagnoses     Left flank pain    -  1    Chronic maxillary sinusitis           Follow-ups after your visit        Additional Services     [facility]       Referral to [facility]  Phone:  [phone]  Reason for Referral:  Chronic sinus congestion on left side    Please be aware that coverage of these services is subject to the terms and limitations of your health insurance plan.  Call member services at your health plan with any benefit or coverage questions.                  Who to contact     If you have questions or need follow up information about today's clinic visit or your schedule please contact [facility] directly at [phone].  Normal or non-critical lab and imaging results will be communicated to you by [employer], letter or phone within 4 business days after the clinic has received the results. If you do not hear from us within 7 days, please contact the clinic through [employer] or phone. If you have a critical or abnormal lab result, we will notify you by phone as soon as possible.  Submit refill requests through [employer]erv or call your pharmacy and they will forward the refill request to us. Please allow 3 business days for your refill to be completed.          Additional Information About Your Visit        [facility] Information     Transerv lets you send messages to your doctor, view your test results, renew your prescriptions, schedule appointments and more. To sign up, go to [email or web address] . Click on \"Log in\" on the left side of the screen, which will take you to the Welcome page. Then click on \"Sign up Now\" on " the right side of the page.     You will be asked to enter the access code listed below, as well as some personal information. Please follow the directions to create your username and password.     Your access code is: [email or web address]  Expires: 2018  1:58 PM     Your access code will  in 90 days. If you need help or a new code, please call your [facility] or [phone].        Care EveryWhere ID     This is your Care EveryWhere ID. This could be used by other organizations to access your [place] medical records  [id number]        Your Vitals Were     Pulse Temperature Respirations BMI (Body Mass Index)          60 98.1  F (36.7  C) (Oral) 12 28.98 kg/m2         Blood Pressure from Last 3 Encounters:   18 126/74   18 124/72   18 128/80    Weight from Last 3 Encounters:   18 74.2 kg (163 lb 9.6 oz)   18 74.5 kg (164 lb 3.2 oz)   18 74.4 kg (164 lb)              We Performed the Following     [facility]     Urinalysis w/reflex protein, bili ([facility])     Urine Culture  Routine ([facility])          Today's Medication Changes          These changes are accurate as of 18  1:58 PM.  If you have any questions, ask your nurse or doctor.               Start taking these medicines.        Dose/Directions    fluticasone 50 MCG/ACT spray   Commonly known as:  FLONASE   Used for:  Chronic maxillary sinusitis   Started by:  Sada Mayes APRN CNP        Dose:  1-2 spray   Spray 1-2 sprays into both nostrils daily   Quantity:  1 Bottle   Refills:  3            Where to get your medicines      These medications were sent to [facility] [id number] [place] [address] [address] [facility]  [address] [place] [id number]     Phone:  [phone]     fluticasone 50 MCG/ACT spray                Primary Care Provider Office Phone # Fax #    Nidia Pierce [phone] [address] [place] [address]        Equal Access to Services     EUGENIO DONNELLY : Hadii aad ku hadrosakhadra Mirnajessica, wagogodeepika grider, sujathabrodie horowitzstevendeepika rodriguez, racheal leonalexandroalexys maher. So [facility] [phone].    ATENCIÓN: Si habla español, tiene a myers disposición servicios gratuitos de asistencia lingüística. [facility] [phone].    We comply with applicable federal civil rights laws and [place] laws. We do not discriminate on the basis of race, color, national origin, age, disability, sex, sexual orientation, or gender identity.            Thank you!     Thank you for choosing [facility]  for your care. Our goal is always to provide you with excellent care. Hearing back from our patients is one way we can continue to improve our services. Please take a few minutes to complete the written survey that you may receive in the mail after your visit with us. Thank you!             Your Updated Medication List - Protect others around you: Learn how to safely use, store and throw away your medicines at www.disposemymeds.org.          This list is accurate as of 8/7/18  1:58 PM.  Always use your most recent med list.                   Brand Name Dispense Instructions for use Diagnosis    BIOTIN 5000 PO      Take 1 chew tab by mouth daily        cholecalciferol 5000 units Tabs tablet    vitamin D3     Take 5,000 Units by mouth daily        COLACE PO      Take 50 mg by mouth as needed        CYTOMEL PO      Take 10 mcg by mouth daily 2  @ 5 mcg tablets = 10 mcg/d        eletriptan 20 MG tablet    RELPAX    18 tablet    Take 1-2 tablets (20-40 mg) by mouth at onset of headache for migraine May repeat in 2 hours. Max 4 tablets/24 hours.    Migraine with aura and without status migrainosus, not intractable       fluticasone 50 MCG/ACT spray    FLONASE    1 Bottle    Spray 1-2 sprays into both nostrils daily    Chronic maxillary sinusitis       MOTRIN IB PO      Take 200 mg by mouth as  needed for moderate pain        MULTIVITAMINS PEDIATRIC PO      Take 2 tablets by mouth daily        SYNTHROID PO      Take 88 mcg by mouth daily        TESTOSTERONE IM      Inject 0.2 mLs into the muscle See Admin Instructions  q 2 weeks        UNABLE TO FIND      15 % daily MEDICATION NAME: progesterone cream Applies a 1/4 teaspoon daily    Diverticulitis of colon, Abdominal pain, left upper quadrant

## 2018-08-07 NOTE — PROGRESS NOTES
Problem(s) Oriented visit        SUBJECTIVE:                                                    Giovana Key is a 67 year old female who presents to clinic today for the following health issues : left sided pain x 2 days when pushes on side, worse with laying down and pushes, only bothers if pushes on it.  Not much appetite lately, discomfort not affected by eating.  Was nauseated for 2 mornings but no vomiting, only in morning then went away. No fevers or chills, burning with urination. Has been pushing on side frequently trying to pinpoint a spot that hurts. NO blood when wiping or in toilet.     Also pressure on right sinus for a while, uses saline every am.   Chronic problem with this side.         Problem list, Medication list, Allergies, and Medical/Social/Surgical histories reviewed in EPIC and updated as appropriate.   Additional history: as documented    ROS:  5 point ROS completed and negative except noted above, including Gen, CV, Resp, GI, MS    Histories:   Patient Active Problem List   Diagnosis     heel pain     Peroneal tendonitis     Pain in joint, ankle and foot     Other enthesopathy of ankle and tarsus     Health Care Home     Advanced directives, counseling/discussion     Diverticulitis of colon     Graves disease     Pharyngeal dysphagia     Migraine with aura and without status migrainosus, not intractable     Chronic congestion of paranasal sinus     Past Surgical History:   Procedure Laterality Date     CHOLECYSTECTOMY       COSMETIC SURGERY      breast augmentation     ENDOSCOPIC RETROGRADE CHOLANGIOPANCREATOGRAM N/A 6/2/2017    Procedure: COMBINED ENDOSCOPIC RETROGRADE CHOLANGIOPANCREATOGRAPHY, SPHINCTEROTOMY;  ENDOSCOPIC RETROGRADE CHOLANGIOPANCREATOGRAM (ERCP) STONE REMOVAL, SPINCHTEROTOMY;  Surgeon: Preston Barnett MD;  Location:  OR     ENT SURGERY  age 26    tonsillectomy     ESOPHAGOSCOPY, GASTROSCOPY, DUODENOSCOPY (EGD), COMBINED N/A 5/23/2017    Procedure: COMBINED  ENDOSCOPIC ULTRASOUND, ESOPHAGOSCOPY, GASTROSCOPY, DUODENOSCOPY (EGD);  ENDOSCOPIC ULTRASOUND, ESOPHAGOSCOPY, GASTROSCOPY, DUODENOSCOPY (EGD) (MAC SEDATION) ;  Surgeon: Dot Vargas MD;  Location:  GI     ESOPHAGOSCOPY, GASTROSCOPY, DUODENOSCOPY (EGD), COMBINED N/A 5/23/2017    Procedure: COMBINED ESOPHAGOSCOPY, GASTROSCOPY, DUODENOSCOPY (EGD), BIOPSY SINGLE OR MULTIPLE;;  Surgeon: Dot Vargas MD;  Location:  GI     GYN SURGERY      tubal ligation x2     HYSTERECTOMY VAGINAL  1997    Dr Grier       Social History   Substance Use Topics     Smoking status: Former Smoker     Types: Cigarettes     Quit date: 1/1/1983     Smokeless tobacco: Never Used     Alcohol use 0.0 oz/week     0 Standard drinks or equivalent per week      Comment: occasionally     Family History   Problem Relation Age of Onset     Hypertension Other            OBJECTIVE:                                                    /74  Pulse 60  Temp 98.1  F (36.7  C) (Oral)  Resp 12  Wt 74.2 kg (163 lb 9.6 oz)  BMI 28.98 kg/m2  Body mass index is 28.98 kg/(m^2).   APPEARANCE: = Relaxed and in no distress  Conj/Eyelids = noninjected and lids and lashes are without inflammation  PERRLA/Irises = Pupils Round Reactive to Light and Irisis without inflammation  Ears/Nose = External structures and Nares have usual shape and form, sinus tenderness on left side  Ear canals and TM's = Canals are not inflammed and have none or little wax and the drums are not injected and have a light reflex   Lips/Teeth/Gums = No lesions seen, good dentition, and gums seem healthy  Oropharynx = No leukoplakia, No injection to the tissues, Normal Uvula  Neck = No anterior or posterior adenopathy appreciated.  Thyroid = Not enlarged and no masses felt  Resp effort = Calm regular breathing  Breath Sounds = Good air movement with no rales or rhonchi in any lung fields  Heart Rate, Rhythm, & sounds (no Murm)  = Regular rate and rhythm with no  S3, S4, or murmur appreciated.  Abdomen = Soft, mild tenderness to palpation on left side mid abdomen, no masses, & bowel sounds in all quadrants, no flank pain   Musculsktl =  Strength and ROM of major joints are within normal limits  SKIN = absent significant rashes or lesions   Recent/Remote Memory = Alert and Oriented x 3  Mood/Affect = Cooperative and interested     ASSESSMENT/PLAN:                                                    1. Left flank pain  F/u if pain worsens or changes  Increase water intake  Don't push on abdomen  - Urinalysis w/reflex protein, bili ([facility])  - Urine Culture  Routine ([facility])    2. Chronic maxillary sinusitis  - fluticasone (FLONASE) 50 MCG/ACT spray; Spray 1-2 sprays into both nostrils daily  Dispense: 1 Bottle; Refill: 3  - [facility]    FUTURE APPOINTMENTS:       - Follow-up for annual visit or as needed    The following health maintenance items are reviewed in Epic and correct as of today:  Health Maintenance   Topic Date Due     MIGRAINE ACTION PLAN  11/07/1969     INFLUENZA VACCINE (1) 09/01/2018     FALL RISK ASSESSMENT  04/04/2019     PHQ-2 Q1 YR  04/04/2019     MAMMO SCREEN Q2 YR (SYSTEM ASSIGNED)  11/29/2019     ADVANCE DIRECTIVE PLANNING Q5 YRS  07/31/2020     TETANUS IMMUNIZATION (SYSTEM ASSIGNED)  04/07/2021     LIPID SCREEN Q5 YR FEMALE (SYSTEM ASSIGNED)  04/04/2023     COLONOSCOPY Q10 YR  01/17/2027     DEXA SCAN SCREENING (SYSTEM ASSIGNED)  Completed     PNEUMOCOCCAL  Completed     HEPATITIS C SCREENING  Completed       AIMEE Israel CNP  [facility]  Family Practice  [facility]  [phone]    For any issues my office # is [phone]

## 2018-08-07 NOTE — LETTER
[facility]  [address] [place]  [address]  Phone: [phone]    August 10, 2018      Giovana Key  [address]  [facility] [address]              Dear Giovana,    The results from your recent visit showed the urine culture did not show any growth. Please feel free to call and schedule a follow up appointment with me if pain persists.        Sincerely,     Sada Mayes, CNP  Amanda, CMA    Results for orders placed or performed in visit on 08/07/18   Urinalysis w/reflex protein, bili ([facility])   Result Value Ref Range    Color Urine Yellow     pH Urine 6.0 5 - 9 pH    Specific Gravity Urine 1.015 1.005 - 1.025    Protein Urine 0 0.01 mg/dL    Glucose Urine 0     Ketones Urine 0     Leukocyte Esterase Urine neg     Blood Urine 1+ (A)     Nitrite Urine Neg NEG    Bilirubin Urine Dip 0     Urobilinogen Urine 1  0.2 - 1.0 EU/dL    WBC Urine 0 0 - 3    RBC Urine 0-3 0 - 3    Epithelial Cells Few     Crystal Urine 0     Bacteria Urine 0     Mucous Urine 0     Casts/LPF 0    Urine Culture  Routine ([facility])   Result Value Ref Range    Urine Culture Final report     Result 1 No growth     Narrative    Performed at:  01 - [facility]  [address] [facility], [place], [place]  [id number]  : Chuckie Vela MD, Phone:  [phone]

## 2018-08-09 LAB
Lab: NO GROWTH
URINE CULTURE: NORMAL

## 2018-11-20 ENCOUNTER — OFFICE VISIT (OUTPATIENT)
Dept: FAMILY MEDICINE | Facility: CLINIC | Age: 68
End: 2018-11-20

## 2018-11-20 VITALS
WEIGHT: 167.6 LBS | SYSTOLIC BLOOD PRESSURE: 120 MMHG | BODY MASS INDEX: 29.69 KG/M2 | DIASTOLIC BLOOD PRESSURE: 82 MMHG | HEART RATE: 50 BPM | OXYGEN SATURATION: 98 % | RESPIRATION RATE: 16 BRPM

## 2018-11-20 DIAGNOSIS — H15.102 EPISCLERITIS OF LEFT EYE: Primary | ICD-10-CM

## 2018-11-20 PROCEDURE — 99213 OFFICE O/P EST LOW 20 MIN: CPT | Performed by: FAMILY MEDICINE

## 2018-11-20 NOTE — MR AVS SNAPSHOT
After Visit Summary   11/20/2018    Giovana Key    MRN: [id number]           Patient Information     Date Of Birth          1950        Visit Information        Provider Department      11/20/2018 2:30 PM Nidia Pierce MD [facility]        Today's Diagnoses     Episcleritis of left eye    -  1      Care Instructions    Recommend using ibuprofen 600 mg (3 OTC pills) 2-3 times daily for the eye symptom.          Follow-ups after your visit        Your next 10 appointments already scheduled     Dec 04, 2018  1:30 PM CST   DX HIP/PELVIS/SPINE with [id number]   [facility] [place] [facility] [place]    [address]  [facility] [id number] [phone]           How do I prepare for my exam? (Food and drink instructions) No Food and Drink Restrictions.  How do I prepare for my exam? (Other instructions) Please do not take any of the following 24 hours prior to the day of your exam: vitamins, calcium tablets, antacids.  What should I wear: If possible, please wear clothes without metal (snaps, zippers). A sweat suit works well.  How long does the exam take: The exam takes about 20 minutes.  What should I bring: Bring a list of your current medicines to your exam (including vitamins, minerals and over-the-counter drugs).  Do I need a :  No  is needed.  What should I do after the exam: No restrictions, You may resume normal activities.  How do I prepare for my exam? (Food and drink instructions) A DEXA scan is a bone-density scan. It uses a low level of radiation to check the strength of your bones. As you lie on a padded table, a machine will take X-rays. We most often scan the hips and lower spine.  Who should I call with questions: If you have any questions, please call the Imaging Department where you will have your exam. Directions, parking instructions, and other information is available on our website,  [email or web address].            Dec 04, 2018  2:00 PM CST   MA SCREENING DIGITAL BILATERAL with WEMA1   [facility] [place] ([facility] [place])    [address]  [place] [address] [phone]           How do I prepare for my exam? (Food and drink instructions) No Food and Drink Restrictions.  How do I prepare for my exam? (Other instructions) Do not use any powder, lotion or deodorant under your arms or on your breast. If you do, we will ask you to remove it before your exam.  What should I wear: Wear comfortable, two-piece clothing.  How long does the exam take: Most scans will take 15 minutes.  What should I bring: Bring any previous mammograms from other facilities or have them mailed to the breast center.  Do I need a :  No  is needed.  What do I need to tell my doctor: If you have any allergies, tell your care team.  What should I do after the exam: No restrictions, You may resume normal activities.  What is this test: This test is an x-ray of the breast to look for breast disease. The breast is pressed between two plates to flatten and spread the tissue. An X-ray is taken of the breast from different angles.  Who should I call with questions: If you have any questions, please call the Imaging Department where you will have your exam. Directions, parking instructions, and other information is available on our website, [email or web address].  Other information about my exam Three-dimensional (3D) mammograms are available at [place] locations in [facility], [place], [facility], [place], [facility] and [place].  Health locations include [place] and the [facility] in [place].  Benefits of 3D mammograms include * Improved rate of cancer detection * Decreases your chance of having to go back for more tests, which means fewer: * \"False-positive\" results (This means that there is an abnormal area but it " "isn't cancer.) * Invasive testing procedures, such as a biopsy or surgery * Can provide clearer images of the breast if you have dense breast tissue.  *3D mammography is an optional exam that anyone can have with a 2D mammogram. It doesn't replace or take the place of a 2D mammogram. 2D mammograms remain an effective screening test for all women.  Not all insurance companies cover the cost of a 3D mammogram. Check with your insurance. Three-dimensional (3D) mammograms are available at [place] locations in [facility], [facility], and [place]. [facility] locations include [place] and [facility] in [place]. Benefits of 3D mammograms include: - Improved rate of cancer detection - Decreases your chance of having to go back for more tests, which means fewer: - \"False-positive\" results (This means that there is an abnormal area but it isn't cancer.) - Invasive testing procedures, such as a biopsy or surgery - Can provide clearer images of the breast if you have dense breast tissue. 3D mammography is an optional exam that anyone can have with a 2D mammogram. It doesn't replace or take the place of a 2D mammogram. 2D mammograms remain an effective screening test for all women.  Not all insurance companies cover the cost of a 3D mammogram. Check with your insurance.            Dec 04, 2018  2:20 PM CST   PHYSICAL with Neema Allison MD   [facility] [place] ([facility] [place])    [address] [place] [id number] [phone]              Who to contact     If you have questions or need follow up information about today's clinic visit or your schedule please contact [facility] directly at [phone].  Normal or non-critical lab and imaging results will be communicated to you by MyChart, letter or phone within 4 business days after the clinic has received the results. If you do not hear " "from us within 7 days, please contact the clinic through [employer] or phone. If you have a critical or abnormal lab result, we will notify you by phone as soon as possible.  Submit refill requests through [employer] or call your pharmacy and they will forward the refill request to us. Please allow 3 business days for your refill to be completed.          Additional Information About Your Visit        [employer] Information     [facility] lets you send messages to your doctor, view your test results, renew your prescriptions, schedule appointments and more. To sign up, go to [email or web address] . Click on \"Log in\" on the left side of the screen, which will take you to the Welcome page. Then click on \"Sign up Now\" on the right side of the page.     You will be asked to enter the access code listed below, as well as some personal information. Please follow the directions to create your username and password.     Your access code is: [email or web address]  Expires: 2019  2:59 PM     Your access code will  in 90 days. If you need help or a new code, please call your [place] clinic or [phone].        Care EveryWhere ID     This is your Care EveryWhere ID. This could be used by other organizations to access your [place] medical records  [id number]        Your Vitals Were     Pulse Respirations Pulse Oximetry BMI (Body Mass Index)          50 16 98% 29.69 kg/m2         Blood Pressure from Last 3 Encounters:   18 120/82   18 126/74   18 124/72    Weight from Last 3 Encounters:   18 76 kg (167 lb 9.6 oz)   18 74.2 kg (163 lb 9.6 oz)   18 74.5 kg (164 lb 3.2 oz)              Today, you had the following     No orders found for display       Primary Care Provider Office Phone # Fax #    Nidia Pierce [phone] [address] [place] [address]        Equal Access to Services     EUGENIO DONNELLY AH: eduar Sue, " david carlos manuelstevendeepika cortesracheal arora marianin hayaan adeeg kharash la'aan ah. So [facility] [phone].    ATENCIÓN: Si joanna montes, tiene a myers disposición servicios gratuitos de asistencia lingüística. Ty al [phone].    We comply with applicable federal civil rights laws and [place] laws. We do not discriminate on the basis of race, color, national origin, age, disability, sex, sexual orientation, or gender identity.            Thank you!     Thank you for choosing [facility]  for your care. Our goal is always to provide you with excellent care. Hearing back from our patients is one way we can continue to improve our services. Please take a few minutes to complete the written survey that you may receive in the mail after your visit with us. Thank you!             Your Updated Medication List - Protect others around you: Learn how to safely use, store and throw away your medicines at www.disposemymeds.org.          This list is accurate as of 11/20/18  2:59 PM.  Always use your most recent med list.                   Brand Name Dispense Instructions for use Diagnosis    BIOTIN 5000 PO      Take 1 chew tab by mouth daily        cholecalciferol 5000 units Tabs tablet    vitamin D3     Take 5,000 Units by mouth daily        COLACE PO      Take 50 mg by mouth as needed        CYTOMEL PO      Take 10 mcg by mouth daily 2  @ 5 mcg tablets = 10 mcg/d        eletriptan 20 MG tablet    RELPAX    18 tablet    Take 1-2 tablets (20-40 mg) by mouth at onset of headache for migraine May repeat in 2 hours. Max 4 tablets/24 hours.    Migraine with aura and without status migrainosus, not intractable       fluticasone 50 MCG/ACT spray    FLONASE    1 Bottle    Spray 1-2 sprays into both nostrils daily    Chronic maxillary sinusitis       MOTRIN IB PO      Take 200 mg by mouth as needed for moderate pain        MULTIVITAMINS PEDIATRIC PO      Take 2 tablets by mouth daily        SYNTHROID PO      Take 88 mcg by mouth  daily        TESTOSTERONE IM      Inject 0.2 mLs into the muscle See Admin Instructions  q 2 weeks        UNABLE TO FIND      15 % daily MEDICATION NAME: progesterone cream Applies a 1/4 teaspoon daily    Diverticulitis of colon, Abdominal pain, left upper quadrant

## 2018-11-20 NOTE — PROGRESS NOTES
Problem(s) Oriented visit        SUBJECTIVE:                                                    Giovana Key is a 68 year old female who presents to clinic today for one week of red eyes. She washes her eyes every morning with a [employer] eye wash. She notes a week of Her eyes feel a pressure. No mattering. Vision seems fine. No FBS. Eyes seem mildly more sensitive to light for the last few days.     She continues to have sinus problems, seen by ENT and put on antibiotic and prednisone. Doesn't feel that it helped.      Problem list, Medication list, Allergies, and Medical/Social/Surgical histories reviewed in [facility] and updated as appropriate.   Additional history: as documented    ROS:  5 point ROS completed and negative except noted above, including Gen, CV, Resp, GI, MS      Histories:   Patient Active Problem List   Diagnosis     heel pain     Peroneal tendonitis     Pain in joint, ankle and foot     Other enthesopathy of ankle and tarsus     Health Care Home     Advanced directives, counseling/discussion     Diverticulitis of colon     Graves disease     Pharyngeal dysphagia     Migraine with aura and without status migrainosus, not intractable     Chronic congestion of paranasal sinus     Past Surgical History:   Procedure Laterality Date     CHOLECYSTECTOMY       COSMETIC SURGERY      breast augmentation     ENDOSCOPIC RETROGRADE CHOLANGIOPANCREATOGRAM N/A 6/2/2017    Procedure: COMBINED ENDOSCOPIC RETROGRADE CHOLANGIOPANCREATOGRAPHY, SPHINCTEROTOMY;  ENDOSCOPIC RETROGRADE CHOLANGIOPANCREATOGRAM (ERCP) STONE REMOVAL, SPINCHTEROTOMY;  Surgeon: Preston Barnett MD;  Location:  OR     ENT SURGERY  age 26    tonsillectomy     ESOPHAGOSCOPY, GASTROSCOPY, DUODENOSCOPY (EGD), COMBINED N/A 5/23/2017    Procedure: COMBINED ENDOSCOPIC ULTRASOUND, ESOPHAGOSCOPY, GASTROSCOPY, DUODENOSCOPY (EGD);  ENDOSCOPIC ULTRASOUND, ESOPHAGOSCOPY, GASTROSCOPY, DUODENOSCOPY (EGD) (MAC SEDATION) ;  Surgeon: Sam  Dot Lloyd MD;  Location:  GI     ESOPHAGOSCOPY, GASTROSCOPY, DUODENOSCOPY (EGD), COMBINED N/A 5/23/2017    Procedure: COMBINED ESOPHAGOSCOPY, GASTROSCOPY, DUODENOSCOPY (EGD), BIOPSY SINGLE OR MULTIPLE;;  Surgeon: Dot Vargas MD;  Location:  GI     GYN SURGERY      tubal ligation x2     HYSTERECTOMY VAGINAL  1997    Dr Grier       Social History   Substance Use Topics     Smoking status: Former Smoker     Types: Cigarettes     Quit date: 1/1/1983     Smokeless tobacco: Never Used     Alcohol use 0.0 oz/week     0 Standard drinks or equivalent per week      Comment: occasionally     Family History   Problem Relation Age of Onset     Hypertension Other            OBJECTIVE:                                                    /82  Pulse 50  Resp 16  Wt 76 kg (167 lb 9.6 oz)  SpO2 98%  BMI 29.69 kg/m2  Body mass index is 29.69 kg/(m^2).   GENERAL APPEARANCE: Alert, no acute distress  EYES: exophthalmos is noted. Conjunctiva of the left lid and globe appears injected. There is no mattering. EOM-I. PERRL  HENT: Ears and TMs normal, oral mucosa and posterior oropharynx normal  NECK: No adenopathy,masses or thyromegaly  NEURO: Alert, oriented, speech and mentation normal  PSYCH: mentation appears normal, affect and mood normal   Labs Resulted Today:   Results for orders placed or performed in visit on 08/07/18   Urinalysis w/reflex protein, bili ([facility])   Result Value Ref Range    Color Urine Yellow     pH Urine 6.0 5 - 9 pH    Specific Gravity Urine 1.015 1.005 - 1.025    Protein Urine 0 0.01 mg/dL    Glucose Urine 0     Ketones Urine 0     Leukocyte Esterase Urine neg     Blood Urine 1+ (A)     Nitrite Urine Neg NEG    Bilirubin Urine Dip 0     Urobilinogen Urine 1  0.2 - 1.0 EU/dL    WBC Urine 0 0 - 3    RBC Urine 0-3 0 - 3    Epithelial Cells Few     Crystal Urine 0     Bacteria Urine 0     Mucous Urine 0     Casts/LPF 0    Urine Culture  Routine ([facility])   Result Value Ref Range     Urine Culture Final report     Result 1 No growth     Narrative    Performed at:  01 - [facility] [address], [place], [place]  [id number]  : Chuckie Vela MD, Phone:  [phone]     ASSESSMENT/PLAN:                                                        Giovana was seen today for eye problem and sinus problem.    Diagnoses and all orders for this visit:    Episcleritis of left eye      Patient Instructions   Recommend using ibuprofen 600 mg (3 OTC pills) 2-3 times daily for the eye symptom.  If failure to resolve recommend evaluation by ophthalmology.    The following health maintenance items are reviewed in Epic and correct as of today:  Health Maintenance   Topic Date Due     MIGRAINE ACTION PLAN  11/07/1969     INFLUENZA VACCINE (1) 09/01/2018     FALL RISK ASSESSMENT  04/04/2019     PHQ-2 Q1 YR  04/04/2019     MAMMO SCREEN Q2 YR (SYSTEM ASSIGNED)  11/29/2019     ADVANCE DIRECTIVE PLANNING Q5 YRS  07/31/2020     TETANUS IMMUNIZATION (SYSTEM ASSIGNED)  04/07/2021     LIPID SCREEN Q5 YR FEMALE (SYSTEM ASSIGNED)  04/04/2023     COLONOSCOPY Q10 YR  01/17/2027     DEXA SCAN SCREENING (SYSTEM ASSIGNED)  Completed     PNEUMOCOCCAL  Completed     HEPATITIS C SCREENING  Completed       Nidia Pierce MD  [facility]  Family Practice  [facility]  [phone]    For any issues my office # is [phone]

## 2018-11-23 ENCOUNTER — TELEPHONE (OUTPATIENT)
Dept: FAMILY MEDICINE | Facility: CLINIC | Age: 68
End: 2018-11-23

## 2018-11-23 DIAGNOSIS — H15.102 EPISCLERITIS OF LEFT EYE: Primary | ICD-10-CM

## 2018-11-23 RX ORDER — FLUOROMETHOLONE 0.1 %
1 SUSPENSION, DROPS(FINAL DOSAGE FORM)(ML) OPHTHALMIC (EYE) 4 TIMES DAILY
Qty: 1 BOTTLE | Refills: 0 | Status: SHIPPED | OUTPATIENT
Start: 2018-11-23 | End: 2019-04-08

## 2018-11-23 NOTE — TELEPHONE ENCOUNTER
Pt calls reporting her eye is still red since seeing Dr Pierce 11/20. IBU is not helping. Let her know it can take 3 weeks to go away. She would like to try an eye drop before going to opthalmology. Id it doesn't help she will go in. Also discussed she can use cold compresses to help as well. VANNESA Meredith

## 2018-12-03 ENCOUNTER — TELEPHONE (OUTPATIENT)
Dept: OBGYN | Facility: CLINIC | Age: 68
End: 2018-12-03

## 2018-12-03 DIAGNOSIS — M81.0 POSTMENOPAUSAL BONE LOSS: Primary | ICD-10-CM

## 2018-12-04 ENCOUNTER — RADIANT APPOINTMENT (OUTPATIENT)
Dept: MAMMOGRAPHY | Facility: CLINIC | Age: 68
End: 2018-12-04
Payer: COMMERCIAL

## 2018-12-04 ENCOUNTER — OFFICE VISIT (OUTPATIENT)
Dept: OBGYN | Facility: CLINIC | Age: 68
End: 2018-12-04
Payer: COMMERCIAL

## 2018-12-04 ENCOUNTER — RADIANT APPOINTMENT (OUTPATIENT)
Dept: BONE DENSITY | Facility: CLINIC | Age: 68
End: 2018-12-04
Payer: COMMERCIAL

## 2018-12-04 VITALS
SYSTOLIC BLOOD PRESSURE: 118 MMHG | DIASTOLIC BLOOD PRESSURE: 66 MMHG | BODY MASS INDEX: 28.17 KG/M2 | WEIGHT: 165 LBS | HEIGHT: 64 IN

## 2018-12-04 DIAGNOSIS — Z12.31 VISIT FOR SCREENING MAMMOGRAM: ICD-10-CM

## 2018-12-04 DIAGNOSIS — Z01.419 ENCOUNTER FOR GYNECOLOGICAL EXAMINATION WITHOUT ABNORMAL FINDING: Primary | ICD-10-CM

## 2018-12-04 DIAGNOSIS — M81.0 POSTMENOPAUSAL BONE LOSS: ICD-10-CM

## 2018-12-04 PROCEDURE — 77080 DXA BONE DENSITY AXIAL: CPT | Performed by: OBSTETRICS & GYNECOLOGY

## 2018-12-04 PROCEDURE — 77063 BREAST TOMOSYNTHESIS BI: CPT | Mod: TC

## 2018-12-04 PROCEDURE — 77067 SCR MAMMO BI INCL CAD: CPT | Mod: TC

## 2018-12-04 PROCEDURE — 99397 PER PM REEVAL EST PAT 65+ YR: CPT | Performed by: OBSTETRICS & GYNECOLOGY

## 2018-12-04 ASSESSMENT — PATIENT HEALTH QUESTIONNAIRE - PHQ9
5. POOR APPETITE OR OVEREATING: NOT AT ALL
SUM OF ALL RESPONSES TO PHQ QUESTIONS 1-9: 0

## 2018-12-04 ASSESSMENT — ANXIETY QUESTIONNAIRES
GAD7 TOTAL SCORE: 0
5. BEING SO RESTLESS THAT IT IS HARD TO SIT STILL: NOT AT ALL
3. WORRYING TOO MUCH ABOUT DIFFERENT THINGS: NOT AT ALL
1. FEELING NERVOUS, ANXIOUS, OR ON EDGE: NOT AT ALL
6. BECOMING EASILY ANNOYED OR IRRITABLE: NOT AT ALL
7. FEELING AFRAID AS IF SOMETHING AWFUL MIGHT HAPPEN: NOT AT ALL
IF YOU CHECKED OFF ANY PROBLEMS ON THIS QUESTIONNAIRE, HOW DIFFICULT HAVE THESE PROBLEMS MADE IT FOR YOU TO DO YOUR WORK, TAKE CARE OF THINGS AT HOME, OR GET ALONG WITH OTHER PEOPLE: NOT DIFFICULT AT ALL
2. NOT BEING ABLE TO STOP OR CONTROL WORRYING: NOT AT ALL

## 2018-12-04 NOTE — MR AVS SNAPSHOT
"              After Visit Summary   2018    Giovana Key    MRN: [id number]           Patient Information     Date Of Birth          1950        Visit Information        Provider Department      2018 2:20 PM Neema Allison MD [facility]        Today's Diagnoses     Encounter for gynecological examination without abnormal finding    -  1       Follow-ups after your visit        Who to contact     If you have questions or need follow up information about today's clinic visit or your schedule please contact [facility] directly at [phone].  Normal or non-critical lab and imaging results will be communicated to you by Lemonhart, letter or phone within 4 business days after the clinic has received the results. If you do not hear from us within 7 days, please contact the clinic through [employer] or phone. If you have a critical or abnormal lab result, we will notify you by phone as soon as possible.  Submit refill requests through Aviir or call your pharmacy and they will forward the refill request to us. Please allow 3 business days for your refill to be completed.          Additional Information About Your Visit        MyChart Information     Aviir lets you send messages to your doctor, view your test results, renew your prescriptions, schedule appointments and more. To sign up, go to [email or web address] . Click on \"Log in\" on the left side of the screen, which will take you to the Welcome page. Then click on \"Sign up Now\" on the right side of the page.     You will be asked to enter the access code listed below, as well as some personal information. Please follow the directions to create your username and password.     Your access code is: [email or web address]  Expires: 2019  2:59 PM     Your access code will  in 90 days. If you need help or a new code, please call your [place] clinic or [phone].        Care EveryWhere ID     This is your " "Care EveryWhere ID. This could be used by other organizations to access your [place] medical records  [id number]        Your Vitals Were     Height Breastfeeding? BMI (Body Mass Index)             5' 3.5\" (1.613 m) No 28.77 kg/m2          Blood Pressure from Last 3 Encounters:   12/04/18 118/66   11/20/18 120/82   08/07/18 126/74    Weight from Last 3 Encounters:   12/04/18 165 lb (74.8 kg)   11/20/18 167 lb 9.6 oz (76 kg)   08/07/18 163 lb 9.6 oz (74.2 kg)              Today, you had the following     No orders found for display       Primary Care Provider Office Phone # Fax #    Nidia Zuri Pierce [phone] [address] [place] [address]        Equal Access to Services     [facility]: Hadii aad ku hadasho Soomaali, waaxda luqadaha, qaybta kaalmada adeegyada, waxay marianin hayaan shaila esteban . So [facility] [phone].    ATENCIÓN: Si habla español, tiene a myers disposición servicios gratuitos de asistencia lingüística. Joaquinaame al [phone].    We comply with applicable federal civil rights laws and [place] laws. We do not discriminate on the basis of race, color, national origin, age, disability, sex, sexual orientation, or gender identity.            Thank you!     Thank you for choosing [facility]  for your care. Our goal is always to provide you with excellent care. Hearing back from our patients is one way we can continue to improve our services. Please take a few minutes to complete the written survey that you may receive in the mail after your visit with us. Thank you!             Your Updated Medication List - Protect others around you: Learn how to safely use, store and throw away your medicines at www.disposemymeds.org.          This list is accurate as of 12/4/18  2:36 PM.  Always use your most recent med list.                   Brand Name Dispense Instructions for use Diagnosis    BIOTIN 5000 PO      Take 1 chew tab by mouth " daily        cholecalciferol 5000 units Tabs tablet    vitamin D3     Take 5,000 Units by mouth daily        COLACE PO      Take 50 mg by mouth as needed        CYTOMEL PO      Take 10 mcg by mouth daily 2  @ 5 mcg tablets = 10 mcg/d        dextran 70-hypromellose 0.1-0.3 % ophthalmic solution    ARTIFICIAL TEARS PF    1 each    Place 1 drop Into the left eye daily as needed (red eye)    Episcleritis of left eye       eletriptan 20 MG tablet    RELPAX    18 tablet    Take 1-2 tablets (20-40 mg) by mouth at onset of headache for migraine May repeat in 2 hours. Max 4 tablets/24 hours.    Migraine with aura and without status migrainosus, not intractable       fluticasone 50 MCG/ACT nasal spray    FLONASE    1 Bottle    Spray 1-2 sprays into both nostrils daily    Chronic maxillary sinusitis       MOTRIN IB PO      Take 200 mg by mouth as needed for moderate pain        MULTIVITAMINS PEDIATRIC PO      Take 2 tablets by mouth daily        SYNTHROID PO      Take 88 mcg by mouth daily        TESTOSTERONE IM      Inject 0.2 mLs into the muscle See Admin Instructions  q 2 weeks        UNABLE TO FIND      15 % daily MEDICATION NAME: progesterone cream Applies a 1/4 teaspoon daily    Diverticulitis of colon, Abdominal pain, left upper quadrant

## 2018-12-04 NOTE — PROGRESS NOTES
Giovana is a 68 year old  female who presents for annual exam.     Besides routine health maintenance, she has no other health concerns today .    Do you have a Health Care Directive?: No: Advance care planning was reviewed with patient; patient declined at this time.    Fall risk:        HPI:  The patient's PCP is Nidia Pierce MD.    Patient is still on low dose testosterone and progesterone cream, no estrogen  Prior hysterectomy,     GYNECOLOGIC HISTORY:  No LMP recorded. Patient has had a hysterectomy..   reports that she quit smoking about 35 years ago. Her smoking use included Cigarettes. She has never used smokeless tobacco.    Patient is sexually active.  STD testing offered?  Declined  Last PHQ-9 score on record=   PHQ-9 SCORE 2018   PHQ-9 Total Score 0     Last GAD7 score on record=   ROSALINO-7 SCORE 2017   Total Score 0 0     Alcohol Score = 3    HEALTH MAINTENANCE:  Cholesterol: (  Cholesterol   Date Value Ref Range Status   2018 183 100 - 199 mg/dL Final   2017 179 <200 mg/dL Final      Last Mammo: one year ago, Result: normal, Next Mammo: today   Pap: Hyst  DEXA:  today  Colonoscopy:  17, Result:  normal, Next Colonoscopy: 9 years.    Health maintenance updated:  yes    HISTORY:  Obstetric History       T0      L0     SAB0   TAB0   Ectopic0   Multiple0   Live Births0         Patient Active Problem List   Diagnosis     heel pain     Peroneal tendonitis     Pain in joint, ankle and foot     Other enthesopathy of ankle and tarsus     Health Care Home     Advanced directives, counseling/discussion     Diverticulitis of colon     Graves disease     Pharyngeal dysphagia     Migraine with aura and without status migrainosus, not intractable     Chronic congestion of paranasal sinus     Past Surgical History:   Procedure Laterality Date     CHOLECYSTECTOMY       COSMETIC SURGERY      breast augmentation     ENDOSCOPIC RETROGRADE CHOLANGIOPANCREATOGRAM  N/A 6/2/2017    Procedure: COMBINED ENDOSCOPIC RETROGRADE CHOLANGIOPANCREATOGRAPHY, SPHINCTEROTOMY;  ENDOSCOPIC RETROGRADE CHOLANGIOPANCREATOGRAM (ERCP) STONE REMOVAL, SPINCHTEROTOMY;  Surgeon: Preston Barnett MD;  Location:  OR     ENT SURGERY  age 26    tonsillectomy     ESOPHAGOSCOPY, GASTROSCOPY, DUODENOSCOPY (EGD), COMBINED N/A 5/23/2017    Procedure: COMBINED ENDOSCOPIC ULTRASOUND, ESOPHAGOSCOPY, GASTROSCOPY, DUODENOSCOPY (EGD);  ENDOSCOPIC ULTRASOUND, ESOPHAGOSCOPY, GASTROSCOPY, DUODENOSCOPY (EGD) (MAC SEDATION) ;  Surgeon: Dot Vargas MD;  Location:  GI     ESOPHAGOSCOPY, GASTROSCOPY, DUODENOSCOPY (EGD), COMBINED N/A 5/23/2017    Procedure: COMBINED ESOPHAGOSCOPY, GASTROSCOPY, DUODENOSCOPY (EGD), BIOPSY SINGLE OR MULTIPLE;;  Surgeon: Dot Vargas MD;  Location:  GI     GYN SURGERY      tubal ligation x2     HYSTERECTOMY VAGINAL  1997    Dr Grier      Social History   Substance Use Topics     Smoking status: Former Smoker     Types: Cigarettes     Quit date: 1/1/1983     Smokeless tobacco: Never Used     Alcohol use 0.0 oz/week     0 Standard drinks or equivalent per week      Comment: occasionally      Problem (# of Occurrences) Relation (Name,Age of Onset)    Hypertension (1) Other            Current Outpatient Prescriptions   Medication Sig     BIOTIN 5000 PO Take 1 chew tab by mouth daily     cholecalciferol (VITAMIN D3) 5000 units TABS tablet Take 5,000 Units by mouth daily     dextran 70-hypromellose (ARTIFICIAL TEARS PF) 0.1-0.3 % solution Place 1 drop Into the left eye daily as needed (red eye)     Docusate Sodium (COLACE PO) Take 50 mg by mouth as needed      eletriptan (RELPAX) 20 MG tablet Take 1-2 tablets (20-40 mg) by mouth at onset of headache for migraine May repeat in 2 hours. Max 4 tablets/24 hours.     fluticasone (FLONASE) 50 MCG/ACT spray Spray 1-2 sprays into both nostrils daily     Levothyroxine Sodium (SYNTHROID PO) Take 88 mcg by mouth daily      "Liothyronine Sodium (CYTOMEL PO) Take 10 mcg by mouth daily 2  @ 5 mcg tablets = 10 mcg/d     MOTRIN IB PO Take 200 mg by mouth as needed for moderate pain     Pediatric Multivit-Minerals-C (MULTIVITAMINS PEDIATRIC PO) Take 2 tablets by mouth daily     TESTOSTERONE IM Inject 0.2 mLs into the muscle See Admin Instructions  q 2 weeks     UNABLE TO FIND 15 % daily MEDICATION NAME: progesterone cream  Applies a 1/4 teaspoon daily     No current facility-administered medications for this visit.        Allergies   Allergen Reactions     Imitrex [Sumatriptan] Cramps     Codeine GI Disturbance     Doxycycline Nausea     Lactose Diarrhea     Orange Fruit [Citrus] Dermatitis     Cold sores     Strawberries [Strawberry]      Throat itching     Augmentin Hives and Itching     Metronidazole Hives and Itching       Past medical, surgical, social and family history were reviewed and updated in EPIC.    ROS:   12 point review of systems negative other than symptoms noted below.  Head: Nasal Congestion  Cardiovascular: Lightheadedness and Palpitations  Genitourinary: Night Sweats  Neurologic: Dizziness and Headaches  Endocrine: Loss of Hair    EXAM:  /66  Ht 5' 3.5\" (1.613 m)  Wt 165 lb (74.8 kg)  Breastfeeding? No  BMI 28.77 kg/m2   BMI: Body mass index is 28.77 kg/(m^2).    EXAM:  Constitutional: Appearance: Well nourished, well developed alert, in no acute distress  Neck:  Lymph Nodes:  No lymphadenopathy present    Thyroid:  Gland size normal, nontender, no nodules or masses present  on palpation  Chest:  Respiratory Effort:  Breathing unlabored  Cardiovascular:Heart    Auscultation:  Regular rate, normal rhythm, no murmurs present  Breasts: Inspection of Breasts:  No lymphadenopathy present., Palpation of Breasts and Axillae:  No masses present on palpation, no breast tenderness., Axillary Lymph Nodes:  No lymphadenopathy present. and No nodularity, asymmetry or nipple discharge bilaterally.    Implants present, " soft  Gastrointestinal:  Abdominal Examination:  Abdomen nontender to palpation, tone normal without     rigidity or guarding, no masses present, umbilicus without lesions    Liver and speen:  No hepatomegaly present, liver nontender to palpation    Hernias:  No hernias present  Lymphatic: Lymph Nodes:  No other lymphadenopathy present  Skin:  General Inspection:  No rashes present, no lesions present, no areas of  discoloration.    Genitalia and Groin:  No rashes present, no lesions present, no areas of  discoloration, no masses present  Neurologic/Psychiatric:    Mental Status:  Oriented X3     Pelvic Exam:  External Genitalia:     Normal appearance for age, no discharge present, no tenderness present, no inflammatory lesions present, color normal  Vagina:     Normal vaginal vault without central or paravaginal defects, no discharge present, no inflammatory lesions present, no masses present  Bladder:     Nontender to palpation  Urethra:   Urethral Body:  Urethra palpation normal, urethra structural support normal   Urethral Meatus:  No erythema or lesions present  Cervix:     Appearance healthy, no lesions present, nontender to palpation, no bleeding present  Uterus:     Uterus: firm, normal sized and nontender, midplane in position.   Adnexa:     No adnexal tenderness present, no adnexal masses present  Perineum:     Perineum within normal limits, no evidence of trauma, no rashes or skin lesions present  Anus:     Anus within normal limits, no hemorrhoids present  Inguinal Lymph Nodes:     No lymphadenopathy present  Pubic Hair:     Normal pubic hair distribution for age  Genitalia and Groin:     No rashes present, no lesions present, no areas of discoloration, no masses present      COUNSELING:   Reviewed preventive health counseling, as reflected in patient instructions       Regular exercise       Healthy diet/nutrition    BMI:  Body mass index is 28.77 kg/(m^2).     reports that she quit smoking about 35  years ago. Her smoking use included Cigarettes. She has never used smokeless tobacco.      ASSESSMENT:  68 year old female with satisfactory annual exam.    ICD-10-CM    1. Encounter for gynecological examination without abnormal finding Z01.419        PLAN:  Return 1 years  dexa done today  Has breast implants  Normal exam today    Neema Allison MD

## 2018-12-05 ASSESSMENT — ANXIETY QUESTIONNAIRES: GAD7 TOTAL SCORE: 0

## 2019-02-13 ENCOUNTER — OFFICE VISIT (OUTPATIENT)
Dept: FAMILY MEDICINE | Facility: CLINIC | Age: 69
End: 2019-02-13

## 2019-02-13 VITALS
TEMPERATURE: 98.6 F | RESPIRATION RATE: 16 BRPM | WEIGHT: 167 LBS | DIASTOLIC BLOOD PRESSURE: 60 MMHG | BODY MASS INDEX: 29.12 KG/M2 | HEART RATE: 48 BPM | OXYGEN SATURATION: 97 % | SYSTOLIC BLOOD PRESSURE: 112 MMHG

## 2019-02-13 DIAGNOSIS — J01.90 ACUTE SINUSITIS WITH SYMPTOMS > 10 DAYS: Primary | ICD-10-CM

## 2019-02-13 PROCEDURE — 99213 OFFICE O/P EST LOW 20 MIN: CPT | Performed by: FAMILY MEDICINE

## 2019-02-13 RX ORDER — AZITHROMYCIN 250 MG/1
TABLET, FILM COATED ORAL
Qty: 6 TABLET | Refills: 0 | Status: SHIPPED | OUTPATIENT
Start: 2019-02-13 | End: 2019-04-08

## 2019-04-08 ENCOUNTER — OFFICE VISIT (OUTPATIENT)
Dept: FAMILY MEDICINE | Facility: CLINIC | Age: 69
End: 2019-04-08

## 2019-04-08 VITALS
WEIGHT: 164 LBS | OXYGEN SATURATION: 96 % | HEART RATE: 50 BPM | RESPIRATION RATE: 16 BRPM | BODY MASS INDEX: 28.6 KG/M2 | DIASTOLIC BLOOD PRESSURE: 72 MMHG | SYSTOLIC BLOOD PRESSURE: 120 MMHG

## 2019-04-08 DIAGNOSIS — H10.32 ACUTE BACTERIAL CONJUNCTIVITIS OF LEFT EYE: ICD-10-CM

## 2019-04-08 DIAGNOSIS — B07.8 COMMON WART: ICD-10-CM

## 2019-04-08 DIAGNOSIS — H00.014 HORDEOLUM EXTERNUM LEFT UPPER EYELID: Primary | ICD-10-CM

## 2019-04-08 PROCEDURE — 99213 OFFICE O/P EST LOW 20 MIN: CPT | Mod: 25 | Performed by: FAMILY MEDICINE

## 2019-04-08 PROCEDURE — 17110 DESTRUCTION B9 LES UP TO 14: CPT | Performed by: FAMILY MEDICINE

## 2019-04-08 RX ORDER — POLYMYXIN B SULFATE AND TRIMETHOPRIM 1; 10000 MG/ML; [USP'U]/ML
1-2 SOLUTION OPHTHALMIC EVERY 6 HOURS
Qty: 1 BOTTLE | Refills: 0 | Status: SHIPPED | OUTPATIENT
Start: 2019-04-08 | End: 2019-12-17

## 2019-04-08 NOTE — PROGRESS NOTES
Problem(s) Oriented visit        SUBJECTIVE:                                                    Giovana Key is a 68 year old female who presents to clinic today for swelling of the left upper lid. It was painful yesterday and had some discharge. She touched the eye a lot to get it to drain, now she notes increased mattering overnight and during the day today. No eye pain. No photophobia. No vision change.    She also has a persistent lesion on her right palm for about a year. She has tried cutting it off and biting it off, but it keeps coming back.       Problem list, Medication list, Allergies, and Medical/Social/Surgical histories reviewed in EPIC and updated as appropriate.   Additional history: as documented    ROS:  5 point ROS completed and negative except noted above, including Gen, CV, Resp, GI, MS      Histories:   Patient Active Problem List   Diagnosis     heel pain     Peroneal tendonitis     Pain in joint, ankle and foot     Other enthesopathy of ankle and tarsus     Health Care Home     Advanced directives, counseling/discussion     Diverticulitis of colon     Graves disease     Pharyngeal dysphagia     Migraine with aura and without status migrainosus, not intractable     Chronic congestion of paranasal sinus     Past Surgical History:   Procedure Laterality Date     CHOLECYSTECTOMY       COSMETIC SURGERY      breast augmentation     ENDOSCOPIC RETROGRADE CHOLANGIOPANCREATOGRAM N/A 6/2/2017    Procedure: COMBINED ENDOSCOPIC RETROGRADE CHOLANGIOPANCREATOGRAPHY, SPHINCTEROTOMY;  ENDOSCOPIC RETROGRADE CHOLANGIOPANCREATOGRAM (ERCP) STONE REMOVAL, SPINCHTEROTOMY;  Surgeon: Preston Barnett MD;  Location:  OR     ENT SURGERY  age 26    tonsillectomy     ESOPHAGOSCOPY, GASTROSCOPY, DUODENOSCOPY (EGD), COMBINED N/A 5/23/2017    Procedure: COMBINED ENDOSCOPIC ULTRASOUND, ESOPHAGOSCOPY, GASTROSCOPY, DUODENOSCOPY (EGD);  ENDOSCOPIC ULTRASOUND, ESOPHAGOSCOPY, GASTROSCOPY, DUODENOSCOPY (EGD) (MAC SEDATION)  ;  Surgeon: Dot Vargas MD;  Location:  GI     ESOPHAGOSCOPY, GASTROSCOPY, DUODENOSCOPY (EGD), COMBINED N/A 2017    Procedure: COMBINED ESOPHAGOSCOPY, GASTROSCOPY, DUODENOSCOPY (EGD), BIOPSY SINGLE OR MULTIPLE;;  Surgeon: Dot Vargsa MD;  Location:  GI     GYN SURGERY      tubal ligation x2     HYSTERECTOMY VAGINAL      Dr Grier       Social History     Tobacco Use     Smoking status: Former Smoker     Types: Cigarettes     Last attempt to quit: 1983     Years since quittin.2     Smokeless tobacco: Never Used   Substance Use Topics     Alcohol use: Yes     Alcohol/week: 0.0 oz     Comment: occasionally     Family History   Problem Relation Age of Onset     Hypertension Other            OBJECTIVE:                                                    /72   Pulse 50   Resp 16   Wt 74.4 kg (164 lb)   SpO2 96%   BMI 28.60 kg/m    Body mass index is 28.6 kg/m .   GENERAL APPEARANCE: Alert, no acute distress  EYES: PERRL, moderate exophthalmos noted bilaterally. Left upper lid has an erythematous nodule about 3 mm in size. conjunctiva injected.  SKIN: common wart noted on the palm of the right hand. This is frozen with liquid nitrogen in a freeze, thaw, freeze manner.  NEURO: Alert, oriented, speech and mentation normal  PSYCH: mentation appears normal, affect and mood normal   Labs Resulted Today:   Results for orders placed or performed in visit on 18   MA Screen with Implants Bilateral w/Pete    Narrative    SCREENING MAMMOGRAM, BILATERAL, DIGITAL w/CAD AND TOMOSYNTHESIS -  2018 2:53 PM    BREAST SYMPTOMS: No current breast complaints.     COMPARISON:  17, 16, 4/21/15, 14.    BREAST DENSITY: Heterogeneously dense.    COMMENTS: No findings of suspicion for malignancy.   Bilateral  silicone implants appear stable.      Impression    IMPRESSION: BI-RADS CATEGORY: 2 - Benign.    RECOMMENDED FOLLOW-UP: Annual Mammography.    Exam results  letter mailed to patient.      WILLIAM ZAMBRANO MD     ASSESSMENT/PLAN:                                                        Giovana was seen today for eye problem.    Diagnoses and all orders for this visit:    Hordeolum externum left upper eyelid  Hot pack x 10 minutes QID until resolved.    Acute bacterial conjunctivitis of left eye  -     trimethoprim-polymyxin b (POLYTRIM) 50359-5.1 UNIT/ML-% ophthalmic solution; Place 1-2 drops Into the left eye every 6 hours    Common wart  -     DESTRUCT BENIGN LESION, UP TO 14  F/U for repeat freeze if full resolution does not occur in three weeks.      There are no Patient Instructions on file for this visit.    The following health maintenance items are reviewed in Epic and correct as of today:  Health Maintenance   Topic Date Due     MIGRAINE ACTION PLAN  11/07/1969     ZOSTER IMMUNIZATION (1 of 2) 11/07/2001     INFLUENZA VACCINE (1) 09/01/2018     FALL RISK ASSESSMENT  04/04/2019     MEDICARE ANNUAL WELLNESS VISIT  12/04/2019     MAMMO Q1 YR  12/04/2019     PHQ-2 Q1 YR  12/04/2019     ADVANCE DIRECTIVE PLANNING Q5 YRS  07/31/2020     DTAP/TDAP/TD IMMUNIZATION (2 - Td) 04/07/2021     LIPID SCREEN Q5 YR FEMALE (SYSTEM ASSIGNED)  04/04/2023     COLONOSCOPY Q10 YR  01/17/2027     DEXA SCAN SCREENING (SYSTEM ASSIGNED)  Completed     HEPATITIS C SCREENING  Completed     IPV IMMUNIZATION  Aged Out     MENINGITIS IMMUNIZATION  Aged Out       Nidia Pierce MD  [facility]  Family Practice  [facility]  [phone]    For any issues my office # is [phone]

## 2019-04-11 ENCOUNTER — TELEPHONE (OUTPATIENT)
Dept: FAMILY MEDICINE | Facility: CLINIC | Age: 69
End: 2019-04-11

## 2019-04-11 DIAGNOSIS — H10.9 BACTERIAL CONJUNCTIVITIS OF LEFT EYE: Primary | ICD-10-CM

## 2019-04-11 RX ORDER — TOBRAMYCIN 3 MG/ML
1-2 SOLUTION/ DROPS OPHTHALMIC EVERY 4 HOURS
Qty: 5 ML | Refills: 0 | Status: SHIPPED | OUTPATIENT
Start: 2019-04-11 | End: 2019-12-17

## 2019-04-11 NOTE — TELEPHONE ENCOUNTER
Patient called clinic concerned that Polytrim eye drops are burning after administration. . Per Dr. Viramontes -on call MD prescription changed to Tobradex-prescription sent to pharmacy. Patient advised to call clinic if symptoms worsen or do not resolve. Tammie Harvey

## 2019-04-20 ENCOUNTER — HOSPITAL ENCOUNTER (EMERGENCY)
Facility: CLINIC | Age: 69
Discharge: HOME | End: 2019-04-20
Attending: EMERGENCY MEDICINE | Admitting: EMERGENCY MEDICINE
Payer: MEDICARE

## 2019-04-20 VITALS
WEIGHT: 164 LBS | HEIGHT: 63 IN | OXYGEN SATURATION: 96 % | RESPIRATION RATE: 18 BRPM | TEMPERATURE: 98 F | BODY MASS INDEX: 29.06 KG/M2 | SYSTOLIC BLOOD PRESSURE: 117 MMHG | DIASTOLIC BLOOD PRESSURE: 59 MMHG | HEART RATE: 83 BPM

## 2019-04-20 DIAGNOSIS — T78.40XA ALLERGIC REACTION, INITIAL ENCOUNTER: ICD-10-CM

## 2019-04-20 PROCEDURE — 25000128 H RX IP 250 OP 636: Performed by: EMERGENCY MEDICINE

## 2019-04-20 PROCEDURE — 25000125 ZZHC RX 250: Performed by: EMERGENCY MEDICINE

## 2019-04-20 PROCEDURE — 99284 EMERGENCY DEPT VISIT MOD MDM: CPT | Mod: 25

## 2019-04-20 PROCEDURE — 96375 TX/PRO/DX INJ NEW DRUG ADDON: CPT

## 2019-04-20 PROCEDURE — 96374 THER/PROPH/DIAG INJ IV PUSH: CPT

## 2019-04-20 PROCEDURE — 96372 THER/PROPH/DIAG INJ SC/IM: CPT | Mod: 59

## 2019-04-20 RX ORDER — DIPHENHYDRAMINE HYDROCHLORIDE 50 MG/ML
25 INJECTION INTRAMUSCULAR; INTRAVENOUS ONCE
Status: COMPLETED | OUTPATIENT
Start: 2019-04-20 | End: 2019-04-20

## 2019-04-20 RX ORDER — EPINEPHRINE 0.3 MG/.3ML
0.3 INJECTION SUBCUTANEOUS PRN
Qty: 0.6 ML | Refills: 0 | Status: SHIPPED | OUTPATIENT
Start: 2019-04-20 | End: 2021-03-03

## 2019-04-20 RX ORDER — PREDNISONE 20 MG/1
40 TABLET ORAL DAILY
Qty: 10 TABLET | Refills: 0 | Status: SHIPPED | OUTPATIENT
Start: 2019-04-20 | End: 2019-06-04

## 2019-04-20 RX ORDER — METHYLPREDNISOLONE SODIUM SUCCINATE 125 MG/2ML
125 INJECTION, POWDER, LYOPHILIZED, FOR SOLUTION INTRAMUSCULAR; INTRAVENOUS ONCE
Status: COMPLETED | OUTPATIENT
Start: 2019-04-20 | End: 2019-04-20

## 2019-04-20 RX ADMIN — METHYLPREDNISOLONE SODIUM SUCCINATE 125 MG: 125 INJECTION, POWDER, FOR SOLUTION INTRAMUSCULAR; INTRAVENOUS at 19:37

## 2019-04-20 RX ADMIN — DIPHENHYDRAMINE HYDROCHLORIDE 25 MG: 50 INJECTION INTRAMUSCULAR; INTRAVENOUS at 19:30

## 2019-04-20 RX ADMIN — FAMOTIDINE 20 MG: 10 INJECTION INTRAVENOUS at 19:33

## 2019-04-20 RX ADMIN — EPINEPHRINE 0.5 MG: 1 INJECTION INTRAMUSCULAR; INTRAVENOUS; SUBCUTANEOUS at 19:38

## 2019-04-20 ASSESSMENT — ENCOUNTER SYMPTOMS
TROUBLE SWALLOWING: 1
VOMITING: 0
FEVER: 0
CHEST TIGHTNESS: 0
APNEA: 0
SHORTNESS OF BREATH: 0

## 2019-04-20 ASSESSMENT — MIFFLIN-ST. JEOR: SCORE: 1248.03

## 2019-04-20 NOTE — ED TRIAGE NOTES
Patient eating PB& J sandwich at [facility]. Patient noticed trouble swallowing. Redness around neck area. Patient doesnt feel right.  Does endorse allergy to Strawberries and honey. 25 mg PO bendryl taken half hr ago.

## 2019-04-20 NOTE — ED AVS SNAPSHOT
Emergency Department  [address] [place] [id number]  Phone:  [phone]  Fax:  [phone]                                    Giovana Key   MRN: [id number]    Department:   Emergency Department   Date of Visit:  4/20/2019           After Visit Summary Signature Page    I have received my discharge instructions, and my questions have been answered. I have discussed any challenges I see with this plan with the nurse or doctor.    ..........................................................................................................................................  Patient/Patient Representative Signature      ..........................................................................................................................................  Patient Representative Print Name and Relationship to Patient    ..................................................               ................................................  Date                                   Time    ..........................................................................................................................................  Reviewed by Signature/Title    ...................................................              ..............................................  Date                                               Time          22EPIC Rev 08/18

## 2019-04-20 NOTE — ED PROVIDER NOTES
History     Chief Complaint:  Allergic Reaction    HPI   Giovana Key is a 68 year old female with a history of Rand-Parkinson-White syndrome and frequent recurrent palpitations, who presents with an allergic reaction. The patient has a number of food allergies including allergies to strawberries, citrus fruit and honey. She reports that today she was at [facility] where she ate a PB&J sandwich on wheat bread with potato chips and a cup of coffee and developed left-sided throat swelling and difficulty swallowing, no difficulty breathing or rash. The patient does not have a known allergy to peanuts and states that she has eaten peanuts a number of times in the past and does not know what ultimately triggered this reaction.  Additionally she does not believe this was strawberry jelly, she believes it was grape jelly.  Prior to evaluation here the patient took a half tablet of benadryl followed by another half as her symptoms did not improve. She reports continued left throat swelling radiating into her left ear with trouble swallowing but denies the sensation of throat scratching and is not in respiratory distress. She has a flushing along the collar of her neck into her chest but denies any further symptoms or concerns otherwise.     Allergies:  Imitrex [Sumatriptan]  Codeine  Doxycycline  Lactose  Orange Fruit [Citrus]  Strawberries [Strawberry]  Augmentin  Metronidazole     Medications:    Biotin   Relpax   Synthroid   Cytomel   Testosterone     Past Medical History:    Graves disease   Diverticulitis   GERD  Renal disease   Thyroid disease   WPW syndrome   Migraines     Past Surgical History:    Cholecystectomy   Breast augmentation   Cholangiopancreatogram   EGD  Tubal ligation   Hysterectomy     Family History:    Hypertension     Social History:  The patient was accompanied to the ED by her mother.  Smoking Status: Former  Smokeless Tobacco: Never  Alcohol Use: Yes   Marital Status:   [2]     Review of  "Systems   Constitutional: Negative for fever.   HENT: Positive for ear pain and trouble swallowing.    Respiratory: Negative for apnea, chest tightness and shortness of breath.    Gastrointestinal: Negative for vomiting.   Skin: Positive for rash.   Neurological: Negative for syncope.   All other systems reviewed and are negative.      Physical Exam     Patient Vitals for the past 24 hrs:   BP Temp Temp src Pulse Heart Rate Resp SpO2 Height Weight   04/20/19 2100 129/65 -- -- 92 98 16 96 % -- --   04/20/19 2000 (!) 113/98 -- -- 83 84 17 98 % -- --   04/20/19 1934 -- -- -- -- -- -- -- 1.6 m (5' 3\") 74.4 kg (164 lb)   04/20/19 1900 138/77 -- -- 60 -- -- 97 % -- --   04/20/19 1855 148/75 98  F (36.7  C) Oral 73 -- 20 97 % -- --       Physical Exam    Physical Exam   Constitutional:  Patient is oriented to person, place, and time. They appear well-developed and well-nourished. Mild distress secondary to throat swelling   HENT:    No swelling, bogginess, difficulty swallowing.   Mouth/Throat:   Oropharynx is clear and moist.   Eyes:    Conjunctivae normal and EOM are normal. Pupils are equal, round, and reactive to light.   Neck:    Normal range of motion.   Cardiovascular: Normal rate, regular rhythm and normal heart sounds.  Exam reveals no gallop and no friction rub.  No murmur heard.  Pulmonary/Chest:  Effort normal and breath sounds normal. Patient has no wheezes. Patient has no rales. No stridor  Abdominal:   Soft. Bowel sounds are normal. Patient exhibits no mass. There is no tenderness. There is no rebound and no guarding.   Musculoskeletal:  Normal range of motion. Patient exhibits no edema.   Neurological:   Patient is alert and oriented to person, place, and time. Patient has normal strength. No cranial nerve deficit or sensory deficit. GCS 15  Skin:   Mild erythema in a collar distribution at the base of the neck.   Psychiatric:   Patient has a normal mood and affect. Patient's behavior is normal. Judgment " and thought content normal.     Emergency Department Course     Interventions:  1930 - Benadryl injection 25 mg IV  1933 - Pepcid injection 20 mg IV             Solumedrol 125 mg IV  1838 - Adrenalin kit 0.3-0.5  Intramuscular     Emergency Department Course:  Nursing notes and vitals reviewed.    1908: I performed an exam of the patient as documented above.     2135: Patient rechecked and updated. Patient reports that she feels improved after the above interventions.     Findings and plan explained to the Patient. Patient discharged home with instructions regarding supportive care, medications, and reasons to return. The importance of close follow-up was reviewed.     Impression & Plan      Medical Decision Making:  Giovana Key is a 68-year-old female presenting with an allergic reaction.  It is noted that she ate a peanut butter and jelly sandwich but she does not think it was strawberry jam. Clearly that was something that she ate or cross-contamination in the food today that caused her symptoms. She had no rash.  She had no obvious swelling on her throat exam, no stridor on her respiratory exam.  I did give her the above interventions being that she complained of feeling scratchy in the left side of her throat.  She was observed in the emergency department for 3 hours with good resolution of her symptoms.  At this time I feel she is safe for discharge.  I feel she should probably follow-up with an allergist that she has not been tested for decades.    I will put her on daily steroids with for 5 days.  Benadryl for the next couple of days scheduled then as needed.  I will write her for EpiPen's and I instructed her specifically what she should take this for and to return to the emergency department if she does so.    Diagnosis:    ICD-10-CM    1. Allergic reaction, initial encounter T78.40XA        Disposition:  discharged to home    Discharge Medications:     Medication List      Started    EPINEPHrine 0.3  MG/0.3ML injection 2-pack  Commonly known as:  EPIPEN/ADRENACLICK/or ANY BX GENERIC EQUIV  0.3 mg, Intramuscular, PRN     predniSONE 20 MG tablet  Commonly known as:  DELTASONE  40 mg, Oral, DAILY            Joan Irwin  4/20/2019    EMERGENCY DEPARTMENT  Joan BORJAS, lavon serving as a scribe at 7:08 PM on 4/20/2019 to document services personally performed by Martha Hand MD based on my observations and the provider's statements to me.      Martha Hand MD  04/21/19 8804

## 2019-04-21 ENCOUNTER — NURSE TRIAGE (OUTPATIENT)
Dept: NURSING | Facility: CLINIC | Age: 69
End: 2019-04-21

## 2019-04-21 NOTE — TELEPHONE ENCOUNTER
"Patient was in ED last night due to an allergic reaction to food.  They gave her benadryl and prednisone and sent her home with an RX for prednisone, telling her to take Benadryl regularly for the next few days.    About an hour ago, she ate a small breakfast and took the benadryl.  She hasn't taken the predinsone yet today.      Now, she states she is \"Feeling rev'd up already. My whole body is not good inside\"  She states she is concerned about taking prednisone with feeling rev'd up as she does right now (acknowledging that the prednisone will make her feel even more \"rev'd up\").    She states she feels different.  \"I don't feel at all like myself,\" \"I feel faded away\"  She feels a dizzy and a little nauseasted.  She is able to move around as needed    Denies swelling in face or tongue  No difficulty breathing, however, she states she \"Feels a little breathy.\"   Feels anxious    Patient's PCP is [facility].  Advised patient to call the clinic and see if she can speak with a physician.  If she cannot reach a physician, or if she would prefer, advised patient to return to the Emergency Department now.  Patient states she will do this.    Protocol and care advice reviewed  Caller states understanding of the recommended disposition  Advised to call back if further questions or concerns      Carlene Barlow RN / [facility]    Reason for Disposition    [1] Recent medical visit within 24 hours AND [2] NEW symptom AND [2] that could be serious    Additional Information    Negative: [1] Recent medical visit within 24 hours AND [2] condition/symptoms WORSE    Negative: SEVERE pain (e.g., excruciating, pain scale 8-10) AND [2] not improved after pain medications    Negative: Fever > 104 F (40 C)    Negative: [1] Drinking very little AND [2] dehydration suspected (e.g., no urine > 12 hours, very dry mouth, very lightheaded)    Negative: Patient sounds very sick or weak to the triager    Negative: " Shock suspected (e.g., cold/pale/clammy skin, too weak to stand, low BP, rapid pulse)    Negative: Difficult to awaken or acting confused  (e.g., disoriented, slurred speech)    Negative: Sounds like a life-threatening emergency to the triager    Protocols used: RECENT MEDICAL VISIT FOR ILLNESS FOLLOW-UP CALL-ADULT-AH

## 2019-04-25 ENCOUNTER — OFFICE VISIT (OUTPATIENT)
Dept: FAMILY MEDICINE | Facility: CLINIC | Age: 69
End: 2019-04-25

## 2019-04-25 VITALS
BODY MASS INDEX: 29.23 KG/M2 | OXYGEN SATURATION: 95 % | RESPIRATION RATE: 16 BRPM | DIASTOLIC BLOOD PRESSURE: 68 MMHG | HEART RATE: 56 BPM | WEIGHT: 165 LBS | SYSTOLIC BLOOD PRESSURE: 118 MMHG

## 2019-04-25 DIAGNOSIS — F41.1 GENERALIZED ANXIETY DISORDER: ICD-10-CM

## 2019-04-25 DIAGNOSIS — H00.014 HORDEOLUM EXTERNUM OF LEFT UPPER EYELID: ICD-10-CM

## 2019-04-25 DIAGNOSIS — T78.40XD ALLERGIC REACTION, SUBSEQUENT ENCOUNTER: Primary | ICD-10-CM

## 2019-04-25 DIAGNOSIS — B07.8 COMMON WART: ICD-10-CM

## 2019-04-25 DIAGNOSIS — F32.1 CURRENT MODERATE EPISODE OF MAJOR DEPRESSIVE DISORDER WITHOUT PRIOR EPISODE (H): ICD-10-CM

## 2019-04-25 PROCEDURE — 17110 DESTRUCTION B9 LES UP TO 14: CPT | Performed by: FAMILY MEDICINE

## 2019-04-25 PROCEDURE — 99214 OFFICE O/P EST MOD 30 MIN: CPT | Mod: 25 | Performed by: FAMILY MEDICINE

## 2019-04-25 RX ORDER — PREDNISONE 20 MG/1
20 TABLET ORAL 2 TIMES DAILY
Qty: 10 TABLET | Refills: 1 | Status: SHIPPED | OUTPATIENT
Start: 2019-04-25 | End: 2019-06-04

## 2019-04-25 NOTE — PROGRESS NOTES
Problem(s) Oriented visit        SUBJECTIVE:                                                    Giovana Key is a 68 year old female who presents to clinic today for ER follow up. She had a reaction when she was eating at [employer] and suddenly had her throat feel like it was closing. She was given prednisone 20 mg BID for 5 days, just finished it. She has an appointment for allergist, Sheila Hammer, for Monday, but is nervous to be out of the prednisone until then.     She notes that her anxiety is increasing. She is now at the point of wanting to take something for her anxiety. She is very nervous to eat anything out of the ordinary for fear of a repeat reaction. She feels her moods are not as good as they had been. Her situation with her 's health issues makes her feel more stressed and feels sad about the changes in her life. She is sad that he has not been able to be sexually intimate in over a year. Her horse passed a few years ago and she no longer has that as a stress outlet. She took sertraline for 8 months many years ago and did very well, then was successfully off for many years, just really feeling like everything caught up with her over the last few months.    Wart on her left hand failed to resolve with last freezing, wants another treatment.    Feels that her sty is not entirely resolved, although it is better. She stopped hot-packing and is no longer using eye drops.      Problem list, Medication list, Allergies, and Medical/Social/Surgical histories reviewed in [facility] and updated as appropriate.   Additional history: as documented    ROS:  5 point ROS completed and negative except noted above, including Gen, CV, Resp, GI, MS  See PHQ-9 and Burns Anxiety Checklist for mood related ROS      Histories:   Patient Active Problem List   Diagnosis     heel pain     Peroneal tendonitis     Pain in joint, ankle and foot     Other enthesopathy of ankle and tarsus     Health Care Home     Advanced directives,  counseling/discussion     Diverticulitis of colon     Graves disease     Pharyngeal dysphagia     Migraine with aura and without status migrainosus, not intractable     Chronic congestion of paranasal sinus     Past Surgical History:   Procedure Laterality Date     CHOLECYSTECTOMY       COSMETIC SURGERY      breast augmentation     ENDOSCOPIC RETROGRADE CHOLANGIOPANCREATOGRAM N/A 2017    Procedure: COMBINED ENDOSCOPIC RETROGRADE CHOLANGIOPANCREATOGRAPHY, SPHINCTEROTOMY;  ENDOSCOPIC RETROGRADE CHOLANGIOPANCREATOGRAM (ERCP) STONE REMOVAL, SPINCHTEROTOMY;  Surgeon: Preston Barnett MD;  Location:  OR     ENT SURGERY  age 26    tonsillectomy     ESOPHAGOSCOPY, GASTROSCOPY, DUODENOSCOPY (EGD), COMBINED N/A 2017    Procedure: COMBINED ENDOSCOPIC ULTRASOUND, ESOPHAGOSCOPY, GASTROSCOPY, DUODENOSCOPY (EGD);  ENDOSCOPIC ULTRASOUND, ESOPHAGOSCOPY, GASTROSCOPY, DUODENOSCOPY (EGD) (MAC SEDATION) ;  Surgeon: Dot Vargas MD;  Location:  GI     ESOPHAGOSCOPY, GASTROSCOPY, DUODENOSCOPY (EGD), COMBINED N/A 2017    Procedure: COMBINED ESOPHAGOSCOPY, GASTROSCOPY, DUODENOSCOPY (EGD), BIOPSY SINGLE OR MULTIPLE;;  Surgeon: Dot Vargas MD;  Location:  GI     GYN SURGERY      tubal ligation x2     HYSTERECTOMY VAGINAL      Dr Grier       Social History     Tobacco Use     Smoking status: Former Smoker     Types: Cigarettes     Last attempt to quit: 1983     Years since quittin.3     Smokeless tobacco: Never Used   Substance Use Topics     Alcohol use: Yes     Alcohol/week: 0.0 oz     Comment: 1 drink nightly     Family History   Problem Relation Age of Onset     Hypertension Other            OBJECTIVE:                                                    /68   Pulse 56   Resp 16   Wt 74.8 kg (165 lb)   SpO2 95%   BMI 29.23 kg/m    Body mass index is 29.23 kg/m .   GENERAL APPEARANCE: Alert, no acute distress  EYES: PERRL, exophthalmos noted bilaterally. Left upper  lid is mildly edematous and mildly pink with small palpable firmness near the lash line  SKIN: left palm with 3 mm verrucous growth, this was frozen with liquid nitrogen in a freeze, thaw, freeze manner  NEURO: Alert, oriented, speech and mentation normal  PSYCH: mentation appears normal, affect and mood normal   Labs Resulted Today:   Results for orders placed or performed in visit on 12/04/18   MA Screen with Implants Bilateral w/Pete    Narrative    SCREENING MAMMOGRAM, BILATERAL, DIGITAL w/CAD AND TOMOSYNTHESIS -  12/4/2018 2:53 PM    BREAST SYMPTOMS: No current breast complaints.     COMPARISON:  11/29/17, 11/21/16, 4/21/15, 4/2/14.    BREAST DENSITY: Heterogeneously dense.    COMMENTS: No findings of suspicion for malignancy.   Bilateral  silicone implants appear stable.      Impression    IMPRESSION: BI-RADS CATEGORY: 2 - Benign.    RECOMMENDED FOLLOW-UP: Annual Mammography.    Exam results letter mailed to patient.      WILLIAM ZAMBRANO MD     ASSESSMENT/PLAN:                                                        Giovana was seen today for recheck, derm problem and er f/u.    Diagnoses and all orders for this visit:    Allergic reaction, subsequent encounter  -     predniSONE (DELTASONE) 20 MG tablet; Take 20 mg by mouth 2 times daily.   Renewal of prednisone is given, but hold off on use unless experiencing return of symptoms.    Generalized anxiety disorder  -     sertraline (ZOLOFT) 50 MG tablet; Take 1 tablet (50 mg) by mouth daily  Return in one month to discuss symptom control.    Current moderate episode of major depressive disorder without prior episode (H)  -     sertraline (ZOLOFT) 50 MG tablet; Take 1 tablet (50 mg) by mouth daily  Return in one month to discuss symptom control.    Hordeolum externum of left upper eyelid  Continue with hot packing QID, need to keep it heated for at least ten full minutes at a time.     Common wart  -     DESTRUCT BENIGN LESION, UP TO 14  Re-freeze in 3 weeks if failure  to entirely resolve.        There are no Patient Instructions on file for this visit.    The following health maintenance items are reviewed in Epic and correct as of today:  Health Maintenance   Topic Date Due     MIGRAINE ACTION PLAN  11/07/1969     DEPRESSION ACTION PLAN  11/07/1969     ZOSTER IMMUNIZATION (1 of 2) 11/07/2001     INFLUENZA VACCINE (1) 09/01/2018     FALL RISK ASSESSMENT  04/04/2019     PHQ-9 Q6 MONTHS  06/04/2019     MEDICARE ANNUAL WELLNESS VISIT  12/04/2019     MAMMO Q1 YR  12/04/2019     ADVANCE DIRECTIVE PLANNING Q5 YRS  07/31/2020     DTAP/TDAP/TD IMMUNIZATION (2 - Td) 04/07/2021     LIPID SCREEN Q5 YR FEMALE (SYSTEM ASSIGNED)  04/04/2023     COLONOSCOPY Q10 YR  01/17/2027     DEXA SCAN SCREENING (SYSTEM ASSIGNED)  Completed     HEPATITIS C SCREENING  Completed     IPV IMMUNIZATION  Aged Out     MENINGITIS IMMUNIZATION  Aged Out       Nidia Pierce MD  [facility]  Family Practice  [facility]  [phone]    For any issues my office # is [phone]

## 2019-04-30 ASSESSMENT — PATIENT HEALTH QUESTIONNAIRE - PHQ9: SUM OF ALL RESPONSES TO PHQ QUESTIONS 1-9: 9

## 2019-05-17 ENCOUNTER — OFFICE VISIT (OUTPATIENT)
Dept: FAMILY MEDICINE | Facility: CLINIC | Age: 69
End: 2019-05-17

## 2019-05-17 DIAGNOSIS — F41.1 GAD (GENERALIZED ANXIETY DISORDER): ICD-10-CM

## 2019-05-17 DIAGNOSIS — J02.9 ACUTE SORE THROAT: Primary | ICD-10-CM

## 2019-05-17 LAB
% GRANULOCYTES: 75.6 % (ref 42.2–75.2)
HCT VFR BLD AUTO: 41.2 % (ref 35–46)
HEMOGLOBIN: 13.9 G/DL (ref 11.8–15.5)
LYMPHOCYTES NFR BLD AUTO: 19.2 % (ref 20.5–51.1)
MCH RBC QN AUTO: 32.9 PG (ref 27–31)
MCHC RBC AUTO-ENTMCNC: 33.6 G/DL (ref 33–37)
MCV RBC AUTO: 97.8 FL (ref 80–100)
MONOCYTES NFR BLD AUTO: 5.2 % (ref 1.7–9.3)
PLATELET # BLD AUTO: 203 K/UL (ref 140–450)
RBC # BLD AUTO: 4.21 X10/CMM (ref 3.7–5.2)
WBC # BLD AUTO: 7.7 X10/CMM (ref 3.8–11)

## 2019-05-17 PROCEDURE — 36415 COLL VENOUS BLD VENIPUNCTURE: CPT | Performed by: FAMILY MEDICINE

## 2019-05-17 PROCEDURE — 85025 COMPLETE CBC W/AUTO DIFF WBC: CPT | Performed by: FAMILY MEDICINE

## 2019-05-17 PROCEDURE — 99214 OFFICE O/P EST MOD 30 MIN: CPT | Performed by: FAMILY MEDICINE

## 2019-05-17 RX ORDER — BUSPIRONE HYDROCHLORIDE 5 MG/1
5 TABLET ORAL 3 TIMES DAILY PRN
Qty: 45 TABLET | Refills: 1 | Status: SHIPPED | OUTPATIENT
Start: 2019-05-17 | End: 2019-12-17

## 2019-05-17 NOTE — PROGRESS NOTES
CHIEF COMPLAINT:   Chief Complaint   Patient presents with     Pharyngitis     SUBJECTIVE:  Giovana Key is a 68 year old female with a chief complaint of sore throat.  Onset of symptoms was 1-2 week(s) ago.    Course of illness: gradual onset and still present.  Severity mild  Current and Associated symptoms: runny nose and cough - non-productive  Treatment measures tried include Tylenol/Ibuprofen and Antihistamine.  Predisposing factors include recent illness.    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 Max 4 tablets/24 hours. 18 tablet 1     EPINEPHrine (EPIPEN/ADRENACLICK/OR ANY BX GENERIC EQUIV) 0.3 MG/0.3ML injection 2-pack Inject 0.3 mLs (0.3 mg) into the muscle as needed for anaphylaxis 0.6 mL 0     Levothyroxine Sodium (SYNTHROID PO) Take 88 mcg by mouth daily       Liothyronine Sodium (CYTOMEL PO) Take 10 mcg by mouth daily 2  @ 5 mcg tablets = 10 mcg/d       MOTRIN IB PO Take 200 mg by mouth as needed for moderate pain       Pediatric Multivit-Minerals-C (MULTIVITAMINS  PEDIATRIC PO) Take 2 tablets by mouth daily       predniSONE (DELTASONE) 20 MG tablet Take 20 mg by mouth 2 times daily. 10 tablet 1     sertraline (ZOLOFT) 50 MG tablet Take 1 tablet (50 mg) by mouth daily 30 tablet 1     Sod Phos [place]-Sod Phos Brenda-assisted (VISICOL PO)        TESTOSTERONE IM Inject 0.2 mLs into the muscle See Admin Instructions  q 2 weeks       tobramycin (TOBREX) 0.3 % ophthalmic solution Place 1-2 drops Into the left eye every 4 hours 5 mL 0     trimethoprim-polymyxin b (POLYTRIM) 43477-6.1 UNIT/ML-% ophthalmic solution Place 1-2 drops Into the left eye every 6 hours 1 Bottle 0     UNABLE TO FIND 15 % daily MEDICATION NAME: progesterone cream  Applies a 1/4 teaspoon daily       Social History     Tobacco Use     Smoking status: Former Smoker     Types: Cigarettes     Last attempt to quit: 1983     Years since quittin.3     Smokeless tobacco: Never Used   Substance Use Topics     Alcohol use: Yes     Alcohol/week: 0.0 oz     Comment: 1 drink nightly       ROS:  CONSTITUTIONAL:NEGATIVE for fever, chills, change in weight  INTEGUMENTARY/SKIN: NEGATIVE for worrisome rashes, moles or lesions    OBJECTIVE:   There were no vitals taken for this visit.  GENERAL APPEARANCE: healthy, alert and no distress  EYES: EOMI,  PERRL, conjunctiva clear  HENT: TM's normal bilaterally and oral mucous membranes moist, no erythema noted; pnd noted  NECK: supple, non-tender to palpation, no adenopathy noted  RESP: lungs clear to auscultation - no rales, rhonchi or wheezes  CV: regular rates and rhythm, normal S1 S2, no murmur noted  SKIN: no suspicious lesions or rashes  PSYCH: normal eye contact, not tearful    ASSESSMENT:  1. Acute sore throat  Cbc neg   Reassure   Likely viral illness or allergies  - CBC with Diff/Plt ([facility])    2. ROSALINO (generalized anxiety disorder)  Trial of buspar prn    - busPIRone (BUSPAR) 5 MG tablet; Take 1 tablet (5 mg) by mouth 3 times daily as needed (Patient not taking: Reported on  5/20/2019)  Dispense: 45 tablet; Refill: 1

## 2019-05-20 ENCOUNTER — OFFICE VISIT (OUTPATIENT)
Dept: FAMILY MEDICINE | Facility: CLINIC | Age: 69
End: 2019-05-20

## 2019-05-20 VITALS
OXYGEN SATURATION: 97 % | SYSTOLIC BLOOD PRESSURE: 146 MMHG | BODY MASS INDEX: 28.77 KG/M2 | HEART RATE: 50 BPM | WEIGHT: 162.4 LBS | TEMPERATURE: 97.9 F | DIASTOLIC BLOOD PRESSURE: 78 MMHG

## 2019-05-20 DIAGNOSIS — Z87.891 FORMER SMOKER: ICD-10-CM

## 2019-05-20 DIAGNOSIS — R05.9 COUGH: Primary | ICD-10-CM

## 2019-05-20 PROCEDURE — 99213 OFFICE O/P EST LOW 20 MIN: CPT | Performed by: FAMILY MEDICINE

## 2019-05-20 RX ORDER — BENZONATATE 100 MG/1
100 CAPSULE ORAL 3 TIMES DAILY PRN
Qty: 30 CAPSULE | Refills: 0 | Status: SHIPPED | OUTPATIENT
Start: 2019-05-20 | End: 2020-05-29

## 2019-05-20 NOTE — PATIENT INSTRUCTIONS
Tessalon perrles for cough    Finish the prednisone 2 days    F/u if not better in one month or worsening

## 2019-05-20 NOTE — PROGRESS NOTES
"Chest cold    Subjective     Giovana Key is a 68 year old female who presents to clinic today for the following health issues:    HPI   RESPIRATORY SYMPTOMS  Congestion  Dry cough  Friday had Prednisone 5 day burst ( still has 2 days left of that still)  Goes Boxing on Mondays  Someone in her building went to [place] and got sick \" weshare door knobs in the same building  Allergy rx to Peanut Butter  Former smoker  Nap today       ROS: 10 point ROS neg other than the symptoms noted above in the HPI.    /78 (BP Location: Right arm, Patient Position: Sitting, Cuff Size: Adult Regular)   Pulse 50   Temp 97.9  F (36.6  C) (Oral)   Wt 73.7 kg (162 lb 6.4 oz)   SpO2 97%   BMI 28.77 kg/m    Non focal exam  Exam:  Constitutional: healthy, alert and no distress  Head: Normocephalic. No masses, lesions, tenderness or abnormalities  Neck: Neck supple. No adenopathy. Thyroid symmetric, normal size,, Carotids without bruits.  ENT: ENT exam normal, no neck nodes or sinus tenderness  Cardiovascular: negative, PMI normal. No lifts, heaves, or thrills. RRR. No murmurs, clicks gallops or rub  Respiratory: negative, Percussion normal. Good diaphragmatic excursion. Lungs clear  Gastrointestinal: Abdomen soft, non-tender. BS normal. No masses, organomegaly  : Deferred  Musculoskeletal: extremities normal- no gross deformities noted, gait normal and normal muscle tone  Skin: no suspicious lesions or rashes  Neurologic: Gait normal. Reflexes normal and symmetric. Sensation grossly WNL.  Psychiatric: mentation appears normal and affect normal/bright  Hematologic/Lymphatic/Immunologic: Normal cervical lymph nodes      Has two days left of prednisone still.   Tessalon perrles for cough          {  Patient Active Problem List   Diagnosis     heel pain     Peroneal tendonitis     Health Care Home     Advanced directives, counseling/discussion     Diverticulitis of colon     Graves disease     Pharyngeal dysphagia     " Migraine with aura and without status migrainosus, not intractable     Chronic congestion of paranasal sinus     Past Surgical History:   Procedure Laterality Date     CHOLECYSTECTOMY       COSMETIC SURGERY      breast augmentation     ENDOSCOPIC RETROGRADE CHOLANGIOPANCREATOGRAM N/A 2017    Procedure: COMBINED ENDOSCOPIC RETROGRADE CHOLANGIOPANCREATOGRAPHY, SPHINCTEROTOMY;  ENDOSCOPIC RETROGRADE CHOLANGIOPANCREATOGRAM (ERCP) STONE REMOVAL, SPINCHTEROTOMY;  Surgeon: Preston Barnett MD;  Location:  OR     ENT SURGERY  age 26    tonsillectomy     ESOPHAGOSCOPY, GASTROSCOPY, DUODENOSCOPY (EGD), COMBINED N/A 2017    Procedure: COMBINED ENDOSCOPIC ULTRASOUND, ESOPHAGOSCOPY, GASTROSCOPY, DUODENOSCOPY (EGD);  ENDOSCOPIC ULTRASOUND, ESOPHAGOSCOPY, GASTROSCOPY, DUODENOSCOPY (EGD) (MAC SEDATION) ;  Surgeon: Dot Vargas MD;  Location:  GI     ESOPHAGOSCOPY, GASTROSCOPY, DUODENOSCOPY (EGD), COMBINED N/A 2017    Procedure: COMBINED ESOPHAGOSCOPY, GASTROSCOPY, DUODENOSCOPY (EGD), BIOPSY SINGLE OR MULTIPLE;;  Surgeon: Dot Vargas MD;  Location:  GI     GYN SURGERY      tubal ligation x2     HYSTERECTOMY VAGINAL      Dr Grier       Social History     Tobacco Use     Smoking status: Former Smoker     Types: Cigarettes     Last attempt to quit: 1983     Years since quittin.4     Smokeless tobacco: Never Used   Substance Use Topics     Alcohol use: Yes     Alcohol/week: 0.0 oz     Comment: 1 drink nightly     Family History   Problem Relation Age of Onset     Hypertension Other          Current Outpatient Medications   Medication Sig Dispense Refill     BIOTIN 5000 PO Take 1 chew tab by mouth daily       busPIRone (BUSPAR) 5 MG tablet Take 1 tablet (5 mg) by mouth 3 times daily as needed 45 tablet 1     cholecalciferol (VITAMIN D3) 5000 units TABS tablet Take 5,000 Units by mouth daily       Docusate Sodium (COLACE PO) Take 50 mg by mouth as needed        eletriptan  (RELPAX) 20 MG tablet Take 1-2 tablets (20-40 mg) by mouth at onset of headache for migraine May repeat in 2 hours. Max 4 tablets/24 hours. 18 tablet 1     EPINEPHrine (EPIPEN/ADRENACLICK/OR ANY BX GENERIC EQUIV) 0.3 MG/0.3ML injection 2-pack Inject 0.3 mLs (0.3 mg) into the muscle as needed for anaphylaxis 0.6 mL 0     Levothyroxine Sodium (SYNTHROID PO) Take 88 mcg by mouth daily       Liothyronine Sodium (CYTOMEL PO) Take 5 mcg by mouth daily       MOTRIN IB PO Take 200 mg by mouth as needed for moderate pain       Pediatric Multivit-Minerals-C (MULTIVITAMINS PEDIATRIC PO) Take 2 tablets by mouth daily       predniSONE (DELTASONE) 20 MG tablet Take 20 mg by mouth 2 times daily. 10 tablet 1     Sod Phos [place]-Sod Phos Brenda-[facility] (VISICOL PO)        TESTOSTERONE IM Inject 0.2 mLs into the muscle See Admin Instructions  q 2 weeks       tobramycin (TOBREX) 0.3 % ophthalmic solution Place 1-2 drops Into the left eye every 4 hours 5 mL 0     trimethoprim-polymyxin b (POLYTRIM) 87343-9.1 UNIT/ML-% ophthalmic solution Place 1-2 drops Into the left eye every 6 hours 1 Bottle 0     UNABLE TO FIND 15 % daily MEDICATION NAME: progesterone cream  Applies a 1/4 teaspoon daily       Allergies   Allergen Reactions     Imitrex [Sumatriptan] Cramps     Codeine GI Disturbance     Doxycycline Nausea     Lactose Diarrhea     Orange Fruit [Citrus] Dermatitis     Cold sores     Strawberries [Strawberry]      Throat itching     Augmentin Hives and Itching     Metronidazole Hives and Itching       Reviewed and updated as needed this visit by Provider           Diagnostic Test Results:  Labs reviewed in Epic  Results for orders placed or performed in visit on 05/17/19   CBC with Diff/Plt ([facility])   Result Value Ref Range    WBC x10/cmm 7.7 3.8 - 11.0 x10/cmm    % Lymphocytes 19.2 (A) 20.5 - 51.1 %    % Monocytes 5.2 1.7 - 9.3 %    % Granulocytes 75.6 (A) 42.2 - 75.2 %    RBC x10/cmm 4.21 3.7 - 5.2 x10/cmm    Hemoglobin 13.9 11.8 - 15.5 g/dl     "Hematocrit 41.2 35 - 46 %    MCV 97.8 80 - 100 fL    MCH 32.9 (A) 27.0 - 31.0 pg    MCHC 33.6 33.0 - 37.0 g/dL    Platelet Count 203 140 - 450 K/uL           Assessment & Plan       ICD-10-CM    1. Cough R05 benzonatate (TESSALON) 100 MG capsule   2. Former smoker Z87.891         BMI:   Estimated body mass index is 29.23 kg/m  as calculated from the following:    Height as of 4/20/19: 1.6 m (5' 3\").    Weight as of 4/25/19: 74.8 kg (165 lb).   Weight management plan: Discussed healthy diet and exercise guidelines        Patient Instructions   Tessalon perrles for cough    Finish the prednisone 2 days    F/u if not better in one month or worsening        Carey Gunn MD  [facility]      "

## 2019-05-27 VITALS
TEMPERATURE: 98 F | DIASTOLIC BLOOD PRESSURE: 82 MMHG | RESPIRATION RATE: 10 BRPM | HEART RATE: 72 BPM | SYSTOLIC BLOOD PRESSURE: 122 MMHG

## 2019-05-29 ENCOUNTER — TRANSFERRED RECORDS (OUTPATIENT)
Dept: FAMILY MEDICINE | Facility: CLINIC | Age: 69
End: 2019-05-29

## 2019-06-04 ENCOUNTER — OFFICE VISIT (OUTPATIENT)
Dept: FAMILY MEDICINE | Facility: CLINIC | Age: 69
End: 2019-06-04

## 2019-06-04 VITALS
BODY MASS INDEX: 28.73 KG/M2 | WEIGHT: 162.2 LBS | DIASTOLIC BLOOD PRESSURE: 50 MMHG | TEMPERATURE: 98.5 F | SYSTOLIC BLOOD PRESSURE: 94 MMHG | OXYGEN SATURATION: 97 % | HEART RATE: 48 BPM

## 2019-06-04 DIAGNOSIS — J32.9 CHRONIC SINUSITIS, UNSPECIFIED LOCATION: Primary | ICD-10-CM

## 2019-06-04 PROCEDURE — 99213 OFFICE O/P EST LOW 20 MIN: CPT | Performed by: FAMILY MEDICINE

## 2019-06-06 ENCOUNTER — TRANSFERRED RECORDS (OUTPATIENT)
Dept: FAMILY MEDICINE | Facility: CLINIC | Age: 69
End: 2019-06-06

## 2019-12-17 ENCOUNTER — ANCILLARY PROCEDURE (OUTPATIENT)
Dept: MAMMOGRAPHY | Facility: CLINIC | Age: 69
End: 2019-12-17
Payer: MEDICARE

## 2019-12-17 ENCOUNTER — OFFICE VISIT (OUTPATIENT)
Dept: OBGYN | Facility: CLINIC | Age: 69
End: 2019-12-17
Payer: MEDICARE

## 2019-12-17 VITALS
DIASTOLIC BLOOD PRESSURE: 76 MMHG | SYSTOLIC BLOOD PRESSURE: 118 MMHG | WEIGHT: 158 LBS | HEIGHT: 63 IN | BODY MASS INDEX: 28 KG/M2

## 2019-12-17 DIAGNOSIS — Z01.419 ENCOUNTER FOR GYNECOLOGICAL EXAMINATION WITHOUT ABNORMAL FINDING: Primary | ICD-10-CM

## 2019-12-17 DIAGNOSIS — Z12.31 VISIT FOR SCREENING MAMMOGRAM: ICD-10-CM

## 2019-12-17 PROCEDURE — 77067 SCR MAMMO BI INCL CAD: CPT | Mod: TC

## 2019-12-17 PROCEDURE — 77063 BREAST TOMOSYNTHESIS BI: CPT | Mod: TC

## 2019-12-17 PROCEDURE — 99397 PER PM REEVAL EST PAT 65+ YR: CPT | Performed by: OBSTETRICS & GYNECOLOGY

## 2019-12-17 RX ORDER — CALCIUM CARBONATE 500(1250)
1 TABLET ORAL 2 TIMES DAILY
COMMUNITY
End: 2021-06-18

## 2019-12-17 RX ORDER — ZINC GLUCONATE 50 MG
50 TABLET ORAL DAILY
COMMUNITY
End: 2020-12-18

## 2019-12-17 ASSESSMENT — ANXIETY QUESTIONNAIRES
7. FEELING AFRAID AS IF SOMETHING AWFUL MIGHT HAPPEN: NOT AT ALL
1. FEELING NERVOUS, ANXIOUS, OR ON EDGE: NOT AT ALL
GAD7 TOTAL SCORE: 0
5. BEING SO RESTLESS THAT IT IS HARD TO SIT STILL: NOT AT ALL
2. NOT BEING ABLE TO STOP OR CONTROL WORRYING: NOT AT ALL
3. WORRYING TOO MUCH ABOUT DIFFERENT THINGS: NOT AT ALL
6. BECOMING EASILY ANNOYED OR IRRITABLE: NOT AT ALL
IF YOU CHECKED OFF ANY PROBLEMS ON THIS QUESTIONNAIRE, HOW DIFFICULT HAVE THESE PROBLEMS MADE IT FOR YOU TO DO YOUR WORK, TAKE CARE OF THINGS AT HOME, OR GET ALONG WITH OTHER PEOPLE: NOT DIFFICULT AT ALL

## 2019-12-17 ASSESSMENT — PATIENT HEALTH QUESTIONNAIRE - PHQ9
5. POOR APPETITE OR OVEREATING: NOT AT ALL
SUM OF ALL RESPONSES TO PHQ QUESTIONS 1-9: 0

## 2019-12-17 ASSESSMENT — MIFFLIN-ST. JEOR: SCORE: 1215.81

## 2019-12-18 ASSESSMENT — ANXIETY QUESTIONNAIRES: GAD7 TOTAL SCORE: 0

## 2020-01-17 ENCOUNTER — OFFICE VISIT (OUTPATIENT)
Dept: FAMILY MEDICINE | Facility: CLINIC | Age: 70
End: 2020-01-17

## 2020-01-17 VITALS
WEIGHT: 157 LBS | OXYGEN SATURATION: 99 % | HEART RATE: 56 BPM | BODY MASS INDEX: 27.81 KG/M2 | DIASTOLIC BLOOD PRESSURE: 68 MMHG | RESPIRATION RATE: 16 BRPM | SYSTOLIC BLOOD PRESSURE: 126 MMHG

## 2020-01-17 DIAGNOSIS — K13.29 TONGUE PLAQUE: Primary | ICD-10-CM

## 2020-01-17 PROCEDURE — 99213 OFFICE O/P EST LOW 20 MIN: CPT | Performed by: NURSE PRACTITIONER

## 2020-01-17 NOTE — LETTER
My Depression Action Plan  Name: Giovana Key   Date of Birth 1950  Date: 1/17/2020    My doctor: Nidia Pierce   My clinic: [facility] [address] [place] [address] [phone]          GREEN    ZONE   Good Control    What it looks like:     Things are going generally well. You have normal ups and downs. You may even feel depressed from time to time, but bad moods usually last less than a day.   What you need to do:  1. Continue to care for yourself (see self care plan)  2. Check your depression survival kit and update it as needed  3. Follow your physician s recommendations including any medication.  4. Do not stop taking medication unless you consult with your physician first.           YELLOW         ZONE Getting Worse    What it looks like:     Depression is starting to interfere with your life.     It may be hard to get out of bed; you may be starting to isolate yourself from others.    Symptoms of depression are starting to last most all day and this has happened for several days.     You may have suicidal thoughts but they are not constant.   What you need to do:     1. Call your care team. Your response to treatment will improve if you keep your care team informed of your progress. Yellow periods are signs an adjustment may need to be made.     2. Continue your self-care.  Just get dressed and ready for the day.  Don't give yourself time to talk yourself out of it.    3. Talk to someone in your support network.    4. Open up your Depression Self-Care Plan/Wellness Kit.           RED    ZONE Medical Alert - Get Help    What it looks like:     Depression is seriously interfering with your life.     You may experience these or other symptoms: You can t get out of bed most days, can t work or engage in other necessary activities, you have trouble taking care of basic hygiene, or basic responsibilities, thoughts of suicide or death that will not go away,  self-injurious behavior.     What you need to do:  1. Call your care team and request a same-day appointment. If they are not available (weekends or after hours) call your local crisis line, emergency room or 911.            Depression Self-Care Plan / Wellness Kit    Self-Care for Depression  Here s the deal. Your body and mind are really not as separate as most people think.  What you do and think affects how you feel and how you feel influences what you do and think. This means if you do things that people who feel good do, it will help you feel better.  Sometimes this is all it takes.  There is also a place for medication and therapy depending on how severe your depression is, so be sure to consult with your medical provider and/ or Behavioral Health Consultant if your symptoms are worsening or not improving.     In order to better manage my stress, I will:    Exercise  Get some form of exercise, every day. This will help reduce pain and release endorphins, the  feel good  chemicals in your brain. This is almost as good as taking antidepressants!  This is not the same as joining a gym and then never going! (they count on that by the way ) It can be as simple as just going for a walk or doing some gardening, anything that will get you moving.      Hygiene   Maintain good hygiene (get out of bed in the morning, make your bed, brush your teeth, take a shower, and get dressed like you were going to work, even if you are unemployed).  If your clothes don't fit try to get ones that do.    Diet  Strive to eat foods that are good for me, drink plenty of water, and avoid excessive sugar, caffeine, alcohol, and other mood-altering substances.  Some foods that are helpful in depression are: complex carbohydrates, B vitamins, flaxseed, fish or fish oil, fresh fruits and vegetables.    Psychotherapy  Agree to participate in Individual Therapy (if recommended).    Medication  If prescribed medications, I agree to take them.   Missing doses can result in serious side effects.  I understand that drinking alcohol, or other illicit drug use, may cause potential side effects.  I will not stop my medication abruptly without first discussing it with my provider.    Staying Connected With Others  Stay in touch with my friends, family members, and my primary care provider/team.    Use your imagination  Be creative.  We all have a creative side; it doesn t matter if it s oil painting, sand castles, or mud pies! This will also kick up the endorphins.    Witness Beauty  (AKA stop and smell the roses) Take a look outside, even in mid-winter. Notice colors, textures. Watch the squirrels and birds.     Service to others  Be of service to others.  There is always someone else in need.  By helping others we can  get out of ourselves  and remember the really important things.  This also provides opportunities for practicing all the other parts of the program.    Humor  Laugh and be silly!  Adjust your TV habits for less news and crime-drama and more comedy.    Control your stress  Try breathing deep, massage therapy, biofeedback, and meditation. Find time to relax each day.     Crisis Text Line  http://www.crisistextline.org    The Crisis Text Line serves anyone, in any type of crisis, providing access to free, 24/7 support and information via the medium people already use and trust:    Here's how it works:  1.  Text 425-945 from anywhere in the [place], anytime, about any type of crisis.  2.  A live, trained Crisis Counselor receives the text and responds quickly.  3.  The volunteer Crisis Counselor will help you move from a 'hot moment to a cool moment'.    My support system    Clinic Contact:  Phone number:    Contact 1:  Phone number:    Contact 2:  Phone number:    Judaism/:  Phone number:    Therapist:  Phone number:    Local crisis center:    Phone number:    Other community support:  Phone number:

## 2020-02-21 ENCOUNTER — OFFICE VISIT (OUTPATIENT)
Dept: URGENT CARE | Facility: URGENT CARE | Age: 70
End: 2020-02-21
Payer: COMMERCIAL

## 2020-02-21 VITALS
DIASTOLIC BLOOD PRESSURE: 54 MMHG | TEMPERATURE: 97.5 F | HEART RATE: 63 BPM | BODY MASS INDEX: 28.34 KG/M2 | WEIGHT: 160 LBS | OXYGEN SATURATION: 95 % | SYSTOLIC BLOOD PRESSURE: 121 MMHG

## 2020-02-21 DIAGNOSIS — B96.89 ACUTE BACTERIAL BRONCHITIS: Primary | ICD-10-CM

## 2020-02-21 DIAGNOSIS — G43.109 MIGRAINE WITH AURA AND WITHOUT STATUS MIGRAINOSUS, NOT INTRACTABLE: ICD-10-CM

## 2020-02-21 DIAGNOSIS — J20.8 ACUTE BACTERIAL BRONCHITIS: Primary | ICD-10-CM

## 2020-02-21 PROCEDURE — 99203 OFFICE O/P NEW LOW 30 MIN: CPT

## 2020-02-21 RX ORDER — AZITHROMYCIN 250 MG/1
TABLET, FILM COATED ORAL
Qty: 6 TABLET | Refills: 0 | Status: SHIPPED | OUTPATIENT
Start: 2020-02-21 | End: 2020-03-02

## 2020-02-21 RX ORDER — ELETRIPTAN HYDROBROMIDE 20 MG/1
20-40 TABLET, FILM COATED ORAL
Qty: 18 TABLET | Refills: 1 | Status: SHIPPED | OUTPATIENT
Start: 2020-02-21 | End: 2021-05-13

## 2020-02-21 ASSESSMENT — ENCOUNTER SYMPTOMS
FATIGUE: 1
ABDOMINAL PAIN: 0
CHILLS: 1
DIARRHEA: 0
COUGH: 1
SHORTNESS OF BREATH: 0
SINUS PAIN: 0
SORE THROAT: 0
NAUSEA: 0
SINUS PRESSURE: 0
VOMITING: 0
MYALGIAS: 0

## 2020-02-21 NOTE — PROGRESS NOTES
For the past month, the patient has been experiencing nasal congestion, postnasal drip, and cough.    Starting 4 days ago, the patient started feeling more ill and fatigued.  Cough has become productive.  She has occasional chills but no recorded fever.  Denies shortness of breath.      Past Medical History:   Diagnosis Date     Decreased libido     sees Dr Caruso for testosterone injections causing clitoral megally, hirsutism and alopecia, pt still wants to continue the injections     Diverticulitis of colon 2/29/2016     GERD (gastroesophageal reflux disease)      Graves disease 3/14/2016     PONV (postoperative nausea and vomiting)      Renal disease     2 kidneys on left side, 1 kidney on right side     Thyroid disease      WPW syndrome        Review of Systems   Constitutional: Positive for chills and fatigue.   HENT: Positive for congestion and postnasal drip. Negative for sinus pressure, sinus pain and sore throat.    Respiratory: Positive for cough. Negative for shortness of breath.    Cardiovascular: Positive for chest pain (Sternal posttussive).   Gastrointestinal: Negative for abdominal pain, diarrhea, nausea and vomiting.   Musculoskeletal: Negative for myalgias.       /54   Pulse 63   Temp 97.5  F (36.4  C) (Oral)   Wt 72.6 kg (160 lb)   SpO2 95%   BMI 28.34 kg/m      Physical Exam  Constitutional:       General: She is not in acute distress.     Appearance: She is not ill-appearing.   HENT:      Nose: Congestion present.      Mouth/Throat:      Pharynx: No oropharyngeal exudate or posterior oropharyngeal erythema.   Cardiovascular:      Rate and Rhythm: Normal rate and regular rhythm.      Heart sounds: Normal heart sounds.   Pulmonary:      Effort: Pulmonary effort is normal. No respiratory distress.      Breath sounds: Normal breath sounds.   Lymphadenopathy:      Cervical: No cervical adenopathy.   Neurological:      Mental Status: She is alert and oriented to person, place, and time.          ICD-10-CM    1. Acute bacterial bronchitis J20.8 azithromycin (ZITHROMAX) 250 MG tablet    B96.89        Patient Instructions   Follow up with your doctor if your symptoms persist/worsens, or if you develop new symptoms or side effects from the medication.

## 2020-02-21 NOTE — TELEPHONE ENCOUNTER
Relpax. LOV 1/17/20(acute). Last full labs 4/4/18. No future appt scheduled. Needs AWV with labs.

## 2020-02-24 ENCOUNTER — TELEPHONE (OUTPATIENT)
Dept: FAMILY MEDICINE | Facility: CLINIC | Age: 70
End: 2020-02-24

## 2020-02-24 NOTE — TELEPHONE ENCOUNTER
Patient calls stating was seen at urgent care 2/21 for cough/bronchitis and given antibiotics (zpak). Has 2 doses left of zpak and not feeling better yet (also not worse) and asks if should get another zpak to start when finishes this one.   Discussed how zpak works and encouraged give more time. Call if symptoms worsen or no better by 3/2. Patient agrees.  Chika Samuel RN

## 2020-03-02 ENCOUNTER — OFFICE VISIT (OUTPATIENT)
Dept: FAMILY MEDICINE | Facility: CLINIC | Age: 70
End: 2020-03-02

## 2020-03-02 VITALS
TEMPERATURE: 97.7 F | RESPIRATION RATE: 17 BRPM | OXYGEN SATURATION: 97 % | DIASTOLIC BLOOD PRESSURE: 70 MMHG | BODY MASS INDEX: 28.17 KG/M2 | HEART RATE: 52 BPM | SYSTOLIC BLOOD PRESSURE: 120 MMHG | WEIGHT: 159 LBS

## 2020-03-02 DIAGNOSIS — J01.90 ACUTE SINUSITIS TREATED WITH ANTIBIOTICS IN THE PAST 60 DAYS: ICD-10-CM

## 2020-03-02 DIAGNOSIS — R11.0 NAUSEA: ICD-10-CM

## 2020-03-02 DIAGNOSIS — J20.9 ACUTE BRONCHITIS TREATED WITH ANTIBIOTICS IN THE PAST 60 DAYS: ICD-10-CM

## 2020-03-02 DIAGNOSIS — R05.9 COUGH: Primary | ICD-10-CM

## 2020-03-02 LAB
% GRANULOCYTES: 61.4 % (ref 42.2–75.2)
HCT VFR BLD AUTO: 41.4 % (ref 35–46)
HEMOGLOBIN: 14 G/DL (ref 11.8–15.5)
LYMPHOCYTES NFR BLD AUTO: 30.4 % (ref 20.5–51.1)
MCH RBC QN AUTO: 32.9 PG (ref 27–31)
MCHC RBC AUTO-ENTMCNC: 33.9 G/DL (ref 33–37)
MCV RBC AUTO: 97.1 FL (ref 80–100)
MONOCYTES NFR BLD AUTO: 8.2 % (ref 1.7–9.3)
PLATELET # BLD AUTO: 225 K/UL (ref 140–450)
RBC # BLD AUTO: 4.27 X10/CMM (ref 3.7–5.2)
WBC # BLD AUTO: 7.4 X10/CMM (ref 3.8–11)

## 2020-03-02 PROCEDURE — 71046 X-RAY EXAM CHEST 2 VIEWS: CPT | Mod: FY | Performed by: FAMILY MEDICINE

## 2020-03-02 PROCEDURE — 99214 OFFICE O/P EST MOD 30 MIN: CPT | Mod: 25 | Performed by: FAMILY MEDICINE

## 2020-03-02 PROCEDURE — 85025 COMPLETE CBC W/AUTO DIFF WBC: CPT | Performed by: FAMILY MEDICINE

## 2020-03-02 PROCEDURE — 36415 COLL VENOUS BLD VENIPUNCTURE: CPT | Performed by: FAMILY MEDICINE

## 2020-03-02 RX ORDER — ALBUTEROL SULFATE 90 UG/1
1-2 AEROSOL, METERED RESPIRATORY (INHALATION) EVERY 6 HOURS PRN
Qty: 18 G | Refills: 0 | Status: SHIPPED | OUTPATIENT
Start: 2020-03-02 | End: 2020-03-02

## 2020-03-02 RX ORDER — DOXYCYCLINE 100 MG/1
100 CAPSULE ORAL 2 TIMES DAILY
Qty: 10 CAPSULE | Refills: 0 | Status: SHIPPED | OUTPATIENT
Start: 2020-03-02 | End: 2020-03-19

## 2020-03-02 RX ORDER — ONDANSETRON 4 MG/1
4 TABLET, FILM COATED ORAL EVERY 12 HOURS PRN
Qty: 10 TABLET | Refills: 0 | Status: SHIPPED | OUTPATIENT
Start: 2020-03-02 | End: 2020-05-29

## 2020-03-02 NOTE — LETTER
My Migraine Action Plan      Date: 3/2/2020     My Name: Giovana Key   YOB: 1950  My Pharmacy: [id number] - [address]     My Preventive Medicine(s):  none  My Rescue Medicine(s):  Eletriptan (Relpax) 20 mg  Ibuprofen (Motrin) 800 mg My Doctor: Susan Stinson CNP    My Clinic: [facility] [address] [place] [address] [phone]        GREEN ZONE = Good Control    My headache plan is working.   I can do what I need to do.         I WILL:     ? Keep managing my triggers.  ? Write in my migraine diary each time I have a headache.  ? Keep taking my preventive medicine daily.  ? Take my rescue medicine as needed.             YELLOW ZONE = Not Enough Control    My headache plan isn t always working.   My headaches keep me from doing   some of the things I need to do.   I WILL:     ? Set goals to control my triggers and act on them.  ? Write in my migraine diary each time I have a headache and review it for                     patterns or new triggers.  ? Keep taking my preventive medicine daily.  ? Take my rescue medicine as needed.  l Make sure I stay hydrated.  ? Call my provider or clinic if my rescue medication did not help after taking it on             at least two separate headache days  ? Call my provider or clinic if I need to use my rescue medicine more than an                  average of 2 times per week over the course of one month.               RED ZONE = Poor or No Control    My headache plan has  failed. I can t do anything  when I have one. My  medicines aren t working.   I WILL:   ? Keep taking my preventive medicine daily.  ? Make sure I stay hydrated.  ? Call my provider or clinic if my medicines are not helping or if I am not able to              keep fluids down because of nausea.  ? Let my provider or clinic know within 2 weeks if I have gone to an urgent care or          emergency  department.          Provider specific instructions:      Do not take over the counter pain relievers more than 14 days per month. This includes NSAIDS (Ibuprofen, Motrin, Advil, Naproxen, Aleve, etc), acetaminophen (Tylenol), aspirin, and Excedrin.     If you use a triptan medicine (sumatriptan, rizatriptan, frovatriptan, zolmitriptan, eletriptan etc) take it as soon as you notice the migraine starting. You can take a second dose 2 hours after the first dose if you still have any migraine symptoms.    It is fine to take 600 mg of ibuprofen (Advil) or 440 mg of naproxen (Aleve) with the triptan medicine.  Try not to use triptan medicines more than 2 days a week.    If you use your rescue medicine more than 2 times per week over the course of 1 month, set up an appointment with your provider to talk about starting a medication to prevent migraines.               Other Things I Can Do to Help My Migraines   Track Migraines and Triggers     Keep a headache journal of your symptoms. Try to track how often you have a headache, how long it lasts and what medicines you used. You can also track severity of headache.    You can use a smart phone addison: My Migraine Luciano. The information that you track in the addison can be uploaded for your provider through [employer].     You can also track your migraines on paper. The clinic can print a copy of the Migraine Diary for you. Link: [email or web address]      Lifestyle Changes 1. Try to drink enough water each day (48-70 fluid ounces a day)  2. Limit caffeine intact-one caffeinated beverage a day  3. Eat regular meals  4. Try to get cardiovascular exercise (walking, swimming, biking) 4-5 times a week  5. Try to have a routine sleep schedule going to bed at the same time each night and getting up the same time each morning with enough time to sleep in between  6. Sometimes foods can trigger migraines you can try to eliminate them if you think they make symptoms worse: dairy,  gluten, nuts (cashews, almonds), artificial sweeteners, artificial colors, high sugar content foods, certain alcohol, chocolate, and preservatives like MSG and nitrates.      Other Therapies  Talk to your doctor about adding other therapies to your headache treatment plan including:   - Cognitive behavioral therapy  - Biofeedback  - Practice therapeutic techniques at home such as Progressive Relaxation and Deep Breathing    Research suggests that combining one or more of these therapies with medication can be helpful to reduce the number and severity of migraines.     Learn More To learn more about headaches you can go to the following websites:  https://americanmigrainefoundation.org/   http://www.headaches.org/

## 2020-03-02 NOTE — PROGRESS NOTES
"Problem(s) Oriented visit        SUBJECTIVE:                                                    CC: Giovana Key is a 69 year old female who presents to clinic today for     HPI: Was seen in  for cough on 2/21, completed a course of azithromycin and this was not helpful. When breathing in it can become uncomfortable. Dry cough. Fatigued, dizzy. Sinus pain L sided, feels congested. Subjective mild fevers. Having some wheezing, lots of noise in her chest sometimes. Some nasal drainage. Cough productive of yellow green sputum. Occasional nausea. Thinks she is \"in the middle\" got to a certain point and has not improved. No sick contacts. Does not smoke.    Problem list, Medication list, Allergies, and Medical/Social/Surgical histories reviewed in [facility] and updated as appropriate.     ROS:  Constitutional, HEENT, resp, CV, GI, MSK negative except as listed per HPI    Histories:   Patient Active Problem List   Diagnosis     heel pain     Peroneal tendonitis     Health Care Home     Advanced directives, counseling/discussion     Diverticulitis of colon     Graves disease     Pharyngeal dysphagia     Migraine with aura and without status migrainosus, not intractable     Chronic congestion of paranasal sinus     Past Surgical History:   Procedure Laterality Date     CHOLECYSTECTOMY       COSMETIC SURGERY      breast augmentation     ENDOSCOPIC RETROGRADE CHOLANGIOPANCREATOGRAM N/A 6/2/2017    Procedure: COMBINED ENDOSCOPIC RETROGRADE CHOLANGIOPANCREATOGRAPHY, SPHINCTEROTOMY;  ENDOSCOPIC RETROGRADE CHOLANGIOPANCREATOGRAM (ERCP) STONE REMOVAL, SPINCHTEROTOMY;  Surgeon: Preston Barnett MD;  Location:  OR     ENT SURGERY  age 26    tonsillectomy     ESOPHAGOSCOPY, GASTROSCOPY, DUODENOSCOPY (EGD), COMBINED N/A 5/23/2017    Procedure: COMBINED ENDOSCOPIC ULTRASOUND, ESOPHAGOSCOPY, GASTROSCOPY, DUODENOSCOPY (EGD);  ENDOSCOPIC ULTRASOUND, ESOPHAGOSCOPY, GASTROSCOPY, DUODENOSCOPY (EGD) (MAC SEDATION) ;  Surgeon: Sam" Dot Lloyd MD;  Location:  GI     ESOPHAGOSCOPY, GASTROSCOPY, DUODENOSCOPY (EGD), COMBINED N/A 2017    Procedure: COMBINED ESOPHAGOSCOPY, GASTROSCOPY, DUODENOSCOPY (EGD), BIOPSY SINGLE OR MULTIPLE;;  Surgeon: Dot Vargas MD;  Location:  GI     GYN SURGERY      tubal ligation x2     HYSTERECTOMY VAGINAL      Dr Grier       Social History     Tobacco Use     Smoking status: Former Smoker     Types: Cigarettes     Last attempt to quit: 1983     Years since quittin.1     Smokeless tobacco: Never Used   Substance Use Topics     Alcohol use: Yes     Alcohol/week: 0.0 standard drinks     Comment: 1 drink nightly     Family History   Problem Relation Age of Onset     Hypertension Other      Hypertension Mother 89     No Known Problems Father      No Known Problems Sister      No Known Problems Brother      No Known Problems Maternal Grandmother      No Known Problems Maternal Grandfather      No Known Problems Paternal Grandmother            OBJECTIVE:                                                    Physical Exam:    /70   Pulse 52   Temp 97.7  F (36.5  C) (Oral)   Resp 17   Wt 72.1 kg (159 lb)   SpO2 97%   BMI 28.17 kg/m      CONSTITUTIONAL: Alert non-toxic appearing female in no acute distress, appears to be feeling ill  HEAD: Normocephalic, atraumatic, L maxillary and frontal sinuses tender to palpation, R sinuses non-tender  EYES: PERRLA; no scleral icterus; sclera with mild injection  ENT: EACs clear bilaterally, TMs pearly gray and intact with good visualization of bony landmarks and cone of light, no bulging or erythema; nares patent without ulceration or edema- small amt rhinorrhea; oropharynx pink without lesions; posterior pharynx without exudates; no palatal petechiae  NECK: Neck supple without lymphadenopathy  RESPIRATORY: Lungs clear to auscultation with scattered wheezing throughout, respirations unlabored  CV: Regular rate and rhythm, S1S2, no clicks,  murmurs, rubs, or gallops; bilateral lower extremities without edema, dorsalis pedis pulses 2+ bilaterally  GASTROINTESTINAL: Normoactive bowel sounds x4 quadrants, abdomen soft, non-distended, and non-tender to palpation without masses or organomegaly  MUSCULOSKELETAL: Moves all extremities, no obvious muscle wasting  NEUROLOGIC: No gross deficits, normal gait  SKIN: Appropriate color for race, warm and dry, no rashes or lesions to unclothed skin  PSYCHIATRIC: Pleasant and interactive, affect bright, makes appropriate eye contact, thought process linear       Labs Resulted Today: No results found for any visits on 03/02/20.  ASSESSMENT/PLAN:                                                        Giovana was seen today for recheck.    Diagnoses and all orders for this visit:    Cough  -     X-ray Chest 2 vws*  -     CBC with Diff/Plt ([facility])    Acute sinusitis treated with antibiotics in the past 60 days  -     doxycycline hyclate (VIBRAMYCIN) 100 MG capsule; Take 1 capsule (100 mg) by mouth 2 times daily for 5 days    Acute bronchitis treated with antibiotics in the past 60 days  -     doxycycline hyclate (VIBRAMYCIN) 100 MG capsule; Take 1 capsule (100 mg) by mouth 2 times daily for 5 days  -     Discontinue: albuterol (PROAIR HFA/PROVENTIL HFA/VENTOLIN HFA) 108 (90 Base) MCG/ACT inhaler; Inhale 1-2 puffs into the lungs every 6 hours as needed for shortness of breath / dyspnea or wheezing    Nausea  -     ondansetron (ZOFRAN) 4 MG tablet; Take 1 tablet (4 mg) by mouth every 12 hours as needed for nausea      Given her sinus tenderness and duration of her symptoms, lack of improvement on azithromycin, will treat for both sinusitis and bacterial bronchitis with doxycycline- had nausea with this in the past, may take ondansetron beforehand if needed. Albuterol inhaler for wheezing if needed. Reviewed symptomatic cares and when to be seen further.    Patient Instructions     I am concerned that you may have both a sinus  infection and a pneumonia/bacterial bronchitis.    Sinus infection:  Keep doing your sinus rinses  Flonase can be helpful  Doxycycline 100mg twice daily x5 days  If you have nausea with this, take Zofran 30 minutes beforehand    For your breathing:   Will draw labs, will have formal read of the x-ray  Doxycycline 100mg twice daily x5 days  Albuterol inhaler  Call for fevers or worsening breathing    Patient Education     Acute Bacterial Rhinosinusitis (ABRS)    Acute bacterial rhinosinusitis (ABRS) is an infection of your nasal cavity and sinuses. It s caused by bacteria. Acute means that you ve had symptoms for less than 4 weeks, but possibly up to 12 weeks.  Understanding your sinuses  The nasal cavity is the large air-filled space behind your nose. The sinuses are a group of spaces formed by the bones of your face. They connect with your nasal cavity. ABRS causes the tissue lining these spaces to become inflamed. Mucus may not drain normally. This leads to facial pain and other symptoms.  What causes ABRS?  ABRS most often follows an upper respiratory infection caused by a virus. Bacteria then infect the lining of your nasal cavity and sinuses. But you can also get ABRS if you have:    Nasal allergies    Long-term nasal swelling and congestion not caused by allergies    Blockage in the nose  Symptoms of ABRS  The symptoms of ABRS may be different for each person and include:    Nasal congestion or blockage    Pain or pressure in the face    Thick, colored drainage from the nose  Other symptoms may include:    Runny nose    Fluid draining from the nose down the throat (postnasal drip)    Headache    Cough    Pain    Fever  Diagnosing ABRS  ABRS may be diagnosed if you ve had an upper respiratory infection like a cold and cough for 10 or more days without improvement or with worsening symptoms. Your healthcare provider will ask about your symptoms and your medical history. The provider will check your vital signs,  including your temperature. You ll have a physical exam. The healthcare provider will check your ears, nose, and throat. You likely won t need any tests. If ABRS comes back, you may have a culture or other tests.  Treatment for ABRS  Treatment may include:    Antibiotic medicine. This is for symptoms that last for at least 10 to 14 days.    Nasal corticosteroid medicine. Drops or spray used in the nose can lessen swelling and congestion.    Over-the-counter pain medicine. This is to lessen sinus pain and pressure.    Nasal decongestant medicine. Spray or drops may help to lessen congestion. Do not use them for more than a few days.    Salt wash (saline irrigation). This can help to loosen mucus.  Possible complications of ABRS  ABRS may come back or become long-term (chronic). In rare cases, ABRS may cause complications such as:     Inflamed tissue around the brain and spinal cord (meningitis)    Inflamed tissue around the eyes (orbital cellulitis)    Inflamed bones around the sinuses (osteitis)  These problems may need to be treated in a hospital with intravenous (IV) antibiotic medicine or surgery.  When to call the healthcare provider  Call your healthcare provider if you have any of the following:    Symptoms that don t get better, or get worse    Symptoms that don t get better after 3 to 5 days on antibiotics    Trouble seeing    Swelling around your eyes    Confusion or trouble staying awake   Date Last Reviewed: 5/1/2017 2000-2019 [employer]. [address] [place], [place] [id number]. All rights reserved. This information is not intended as a substitute for professional medical care. Always follow your healthcare professional's instructions.         Patient Education     Bronchitis, Antibiotic Treatment (Adult)    Bronchitis is an infection of the air passages (bronchial tubes) in your lungs. It often occurs when you have a cold. This illness is contagious during the first few days and is  spread through the air by coughing and sneezing, or by direct contact (touching the sick person and then touching your own eyes, nose, or mouth).  Symptoms of bronchitis include cough with mucus (phlegm) and low-grade fever. Bronchitis usually lasts 7 to 14 days. Mild cases can be treated with simple home remedies. More severe infection is treated with an antibiotic.  Home care  Follow these guidelines when caring for yourself at home:    If your symptoms are severe, rest at home for the first 2 to 3 days. When you go back to your usual activities, don't let yourself get too tired.    Don't smoke. Also stay away from secondhand smoke.    You may use over-the-counter medicines to control fever or pain, unless another medicine was prescribed. If you have chronic liver or kidney disease or have ever had a stomach ulcer or gastrointestinal bleeding, talk with your healthcare provider before using these medicines. Also talk to your provider if you are taking medicine to prevent blood clots. Aspirin should never be given to anyone younger than 18 who is ill with a viral infection or fever. It may cause severe liver or brain damage.    Your appetite may be low, so a light diet is fine. Stay well hydrated by drinking 6 to 8 glasses of fluids per day. This includes water, soft drinks, sports drinks, juices, tea, or soup. Extra fluids will help loosen mucus in your nose and lungs.    Over-the-counter cough, cold, and sore-throat medicines will not shorten the length of the illness, but they may be helpful to reduce your symptoms. Don't use decongestants if you have high blood pressure.    Finish all antibiotic medicine. Do this even if you are feeling better after only a few days.  Follow-up care  Follow up with your healthcare provider, or as advised. If you had an X-ray or ECG (electrocardiogram), a specialist will review it. You will be told of any new test results that may affect your care.  If you are age 65 or older, if  you smoke, or if you have a chronic lung disease or condition that affects your immune system, ask your healthcare provider about getting a pneumococcal vaccine and a yearly flu shot (influenza vaccine).  When to seek medical advice  Call your healthcare provider right away if any of these occur:    Fever of 100.4 F (38 C) or higher, or as directed by your healthcare provider    Coughing up more sputum    Weakness, drowsiness, headache, facial pain, ear pain, or a stiff neck  Call 911  Call 911 if any of these occur.    Coughing up blood    Weakness, drowsiness, headache, or stiff neck that get worse    Trouble breathing, wheezing, or pain with breathing  Date Last Reviewed: 6/1/2018 2000-2019 [employer]. [address] [place] [address], [place] [address]. All rights reserved. This information is not intended as a substitute for professional medical care. Always follow your healthcare professional's instructions.               The following health maintenance items are reviewed in Epic and correct as of today:  Health Maintenance   Topic Date Due     MIGRAINE ACTION PLAN  1950     ZOSTER IMMUNIZATION (1 of 2) 11/07/2001     PHQ-9  06/17/2020     MEDICARE ANNUAL WELLNESS VISIT  12/17/2020     MAMMO SCREENING  12/17/2020     FALL RISK ASSESSMENT  01/17/2021     DTAP/TDAP/TD IMMUNIZATION (2 - Td) 04/07/2021     LIPID  04/04/2023     ADVANCE CARE PLANNING  12/17/2024     COLONOSCOPY  01/17/2027     DEXA  Completed     HEPATITIS C SCREENING  Completed     DEPRESSION ACTION PLAN  Completed     INFLUENZA VACCINE  Completed     PNEUMOCOCCAL IMMUNIZATION 65+ LOW/MEDIUM RISK  Completed     IPV IMMUNIZATION  Aged Out     MENINGITIS IMMUNIZATION  Aged Out       AIMEE Acevedo CNP  [facility]  Family Practice  [facility]  [phone]    For any issues my office # is [phone]

## 2020-03-02 NOTE — PATIENT INSTRUCTIONS
I am concerned that you may have both a sinus infection and a pneumonia/bacterial bronchitis.    Sinus infection:  Keep doing your sinus rinses  Flonase can be helpful  Doxycycline 100mg twice daily x5 days  If you have nausea with this, take Zofran 30 minutes beforehand    For your breathing:   Will draw labs, will have formal read of the x-ray  Doxycycline 100mg twice daily x5 days  Albuterol inhaler  Call for fevers or worsening breathing    Patient Education     Acute Bacterial Rhinosinusitis (ABRS)    Acute bacterial rhinosinusitis (ABRS) is an infection of your nasal cavity and sinuses. It s caused by bacteria. Acute means that you ve had symptoms for less than 4 weeks, but possibly up to 12 weeks.  Understanding your sinuses  The nasal cavity is the large air-filled space behind your nose. The sinuses are a group of spaces formed by the bones of your face. They connect with your nasal cavity. ABRS causes the tissue lining these spaces to become inflamed. Mucus may not drain normally. This leads to facial pain and other symptoms.  What causes ABRS?  ABRS most often follows an upper respiratory infection caused by a virus. Bacteria then infect the lining of your nasal cavity and sinuses. But you can also get ABRS if you have:    Nasal allergies    Long-term nasal swelling and congestion not caused by allergies    Blockage in the nose  Symptoms of ABRS  The symptoms of ABRS may be different for each person and include:    Nasal congestion or blockage    Pain or pressure in the face    Thick, colored drainage from the nose  Other symptoms may include:    Runny nose    Fluid draining from the nose down the throat (postnasal drip)    Headache    Cough    Pain    Fever  Diagnosing ABRS  ABRS may be diagnosed if you ve had an upper respiratory infection like a cold and cough for 10 or more days without improvement or with worsening symptoms. Your healthcare provider will ask about your symptoms and your medical  history. The provider will check your vital signs, including your temperature. You ll have a physical exam. The healthcare provider will check your ears, nose, and throat. You likely won t need any tests. If ABRS comes back, you may have a culture or other tests.  Treatment for ABRS  Treatment may include:    Antibiotic medicine. This is for symptoms that last for at least 10 to 14 days.    Nasal corticosteroid medicine. Drops or spray used in the nose can lessen swelling and congestion.    Over-the-counter pain medicine. This is to lessen sinus pain and pressure.    Nasal decongestant medicine. Spray or drops may help to lessen congestion. Do not use them for more than a few days.    Salt wash (saline irrigation). This can help to loosen mucus.  Possible complications of ABRS  ABRS may come back or become long-term (chronic). In rare cases, ABRS may cause complications such as:     Inflamed tissue around the brain and spinal cord (meningitis)    Inflamed tissue around the eyes (orbital cellulitis)    Inflamed bones around the sinuses (osteitis)  These problems may need to be treated in a hospital with intravenous (IV) antibiotic medicine or surgery.  When to call the healthcare provider  Call your healthcare provider if you have any of the following:    Symptoms that don t get better, or get worse    Symptoms that don t get better after 3 to 5 days on antibiotics    Trouble seeing    Swelling around your eyes    Confusion or trouble staying awake   Date Last Reviewed: 5/1/2017 2000-2019 [employer]. [address] [place] [address], [place] [id number]. All rights reserved. This information is not intended as a substitute for professional medical care. Always follow your healthcare professional's instructions.         Patient Education     Bronchitis, Antibiotic Treatment (Adult)    Bronchitis is an infection of the air passages (bronchial tubes) in your lungs. It often occurs when you have a cold. This  illness is contagious during the first few days and is spread through the air by coughing and sneezing, or by direct contact (touching the sick person and then touching your own eyes, nose, or mouth).  Symptoms of bronchitis include cough with mucus (phlegm) and low-grade fever. Bronchitis usually lasts 7 to 14 days. Mild cases can be treated with simple home remedies. More severe infection is treated with an antibiotic.  Home care  Follow these guidelines when caring for yourself at home:    If your symptoms are severe, rest at home for the first 2 to 3 days. When you go back to your usual activities, don't let yourself get too tired.    Don't smoke. Also stay away from secondhand smoke.    You may use over-the-counter medicines to control fever or pain, unless another medicine was prescribed. If you have chronic liver or kidney disease or have ever had a stomach ulcer or gastrointestinal bleeding, talk with your healthcare provider before using these medicines. Also talk to your provider if you are taking medicine to prevent blood clots. Aspirin should never be given to anyone younger than 18 who is ill with a viral infection or fever. It may cause severe liver or brain damage.    Your appetite may be low, so a light diet is fine. Stay well hydrated by drinking 6 to 8 glasses of fluids per day. This includes water, soft drinks, sports drinks, juices, tea, or soup. Extra fluids will help loosen mucus in your nose and lungs.    Over-the-counter cough, cold, and sore-throat medicines will not shorten the length of the illness, but they may be helpful to reduce your symptoms. Don't use decongestants if you have high blood pressure.    Finish all antibiotic medicine. Do this even if you are feeling better after only a few days.  Follow-up care  Follow up with your healthcare provider, or as advised. If you had an X-ray or ECG (electrocardiogram), a specialist will review it. You will be told of any new test results  that may affect your care.  If you are age 65 or older, if you smoke, or if you have a chronic lung disease or condition that affects your immune system, ask your healthcare provider about getting a pneumococcal vaccine and a yearly flu shot (influenza vaccine).  When to seek medical advice  Call your healthcare provider right away if any of these occur:    Fever of 100.4 F (38 C) or higher, or as directed by your healthcare provider    Coughing up more sputum    Weakness, drowsiness, headache, facial pain, ear pain, or a stiff neck  Call 911  Call 911 if any of these occur.    Coughing up blood    Weakness, drowsiness, headache, or stiff neck that get worse    Trouble breathing, wheezing, or pain with breathing  Date Last Reviewed: 6/1/2018 2000-2019 [employer]. [address] [place] [address], [place] [address]. All rights reserved. This information is not intended as a substitute for professional medical care. Always follow your healthcare professional's instructions.

## 2020-03-03 ENCOUNTER — TELEPHONE (OUTPATIENT)
Dept: FAMILY MEDICINE | Facility: CLINIC | Age: 70
End: 2020-03-03

## 2020-03-19 ENCOUNTER — OFFICE VISIT (OUTPATIENT)
Dept: FAMILY MEDICINE | Facility: CLINIC | Age: 70
End: 2020-03-19

## 2020-03-19 VITALS
WEIGHT: 157 LBS | SYSTOLIC BLOOD PRESSURE: 124 MMHG | RESPIRATION RATE: 16 BRPM | DIASTOLIC BLOOD PRESSURE: 72 MMHG | BODY MASS INDEX: 27.81 KG/M2

## 2020-03-19 DIAGNOSIS — H10.023 OTHER MUCOPURULENT CONJUNCTIVITIS OF BOTH EYES: Primary | ICD-10-CM

## 2020-03-19 PROCEDURE — 99214 OFFICE O/P EST MOD 30 MIN: CPT | Performed by: FAMILY MEDICINE

## 2020-03-19 RX ORDER — TOBRAMYCIN AND DEXAMETHASONE 3; 1 MG/ML; MG/ML
1-2 SUSPENSION/ DROPS OPHTHALMIC EVERY 4 HOURS
Qty: 5 ML | Refills: 0 | Status: SHIPPED | OUTPATIENT
Start: 2020-03-19 | End: 2020-05-29

## 2020-03-19 NOTE — PROGRESS NOTES
Swollen burning lids going for last 4-5 days,      The patient complains of typical reflux symptoms: burning sensation after heavy meals, especially when lying down, sometimes with true waterbrash. Denies dysphagia, black or bloody stools or abdominal pain.      Problem(s) Oriented visit      ROS:  General and CV completed and negative except as noted above     HISTORY:   reports current alcohol use.   reports that she quit smoking about 37 years ago. Her smoking use included cigarettes. She has never used smokeless tobacco.    Past Medical History:   Diagnosis Date     Decreased libido      Diverticulitis of colon 2/29/2016     GERD (gastroesophageal reflux disease)      Graves disease 3/14/2016     PONV (postoperative nausea and vomiting)      Renal disease      Thyroid disease      WPW syndrome      Past Surgical History:   Procedure Laterality Date     CHOLECYSTECTOMY       COSMETIC SURGERY      breast augmentation     ENDOSCOPIC RETROGRADE CHOLANGIOPANCREATOGRAM N/A 6/2/2017    Procedure: COMBINED ENDOSCOPIC RETROGRADE CHOLANGIOPANCREATOGRAPHY, SPHINCTEROTOMY;  ENDOSCOPIC RETROGRADE CHOLANGIOPANCREATOGRAM (ERCP) STONE REMOVAL, SPINCHTEROTOMY;  Surgeon: Preston Barnett MD;  Location:  OR     ENT SURGERY  age 26    tonsillectomy     ESOPHAGOSCOPY, GASTROSCOPY, DUODENOSCOPY (EGD), COMBINED N/A 5/23/2017    Procedure: COMBINED ENDOSCOPIC ULTRASOUND, ESOPHAGOSCOPY, GASTROSCOPY, DUODENOSCOPY (EGD);  ENDOSCOPIC ULTRASOUND, ESOPHAGOSCOPY, GASTROSCOPY, DUODENOSCOPY (EGD) (MAC SEDATION) ;  Surgeon: Dot Vargas MD;  Location:  GI     ESOPHAGOSCOPY, GASTROSCOPY, DUODENOSCOPY (EGD), COMBINED N/A 5/23/2017    Procedure: COMBINED ESOPHAGOSCOPY, GASTROSCOPY, DUODENOSCOPY (EGD), BIOPSY SINGLE OR MULTIPLE;;  Surgeon: Dot Vargas MD;  Location:  GI     GYN SURGERY      tubal ligation x2     HYSTERECTOMY VAGINAL  1997    Dr Grier       EXAM:  BP: 124/72   Pulse: Data Unavailable    Temp:  "Data Unavailable    Wt Readings from Last 2 Encounters:   03/19/20 71.2 kg (157 lb)   03/02/20 72.1 kg (159 lb)       BMI= Body mass index is 27.81 kg/m .    EXAM:  APPEARANCE: = Relaxed and in no distress  Conj/Eyelids = injected and lids and lashes are inflamed  PERRLA/Irises = Pupils Round Reactive to Light and Irisis without inflammation  Neck = No anterior or posterior adenopathy appreciated.  Thyroid = Not enlarged and no masses felt  Resp effort = Calm regular breathing  Breath Sounds = Good air movement with no rales or rhonchi in any lung fields  Heart Rate, Rythym, & sounds (no Murm)  = Regular rate and rythym with no S3, S4, or murmer appreciated.  Carotid Art's = Pulses full and equal and no bruits appreciated  Abdomen = Soft, nontender, no masses, & bowel sounds in all quadrants  Liver/Spleen = Normal span and no splenomegaly noted  Digits and Nails = FROM in all finger joints, no nail dystrophy  Ext (edema) = No pretibial edema noted or elsewhere  Musculsktl =  Strength and ROM of major joints are within normal limits  SKIN = absent significant rashes or lesions   Recent/Remote Memory = Alert and Oriented x 3  Mood/Affect = Cooperative and interested      Assessment/Plan:  Giovana was seen today for eye problem, heartburn and recheck.    Diagnoses and all orders for this visit:    Other mucopurulent conjunctivitis of both eyes  -     tobramycin-dexamethasone (TOBRADEX) 0.3-0.1 % ophthalmic suspension; Place 1-2 drops into both eyes every 4 hours        COUNSELING:   reports that she quit smoking about 37 years ago. Her smoking use included cigarettes. She has never used smokeless tobacco.    Estimated body mass index is 27.81 kg/m  as calculated from the following:    Height as of 12/17/19: 1.6 m (5' 3\").    Weight as of this encounter: 71.2 kg (157 lb).       Appropriate preventive services were discussed with this patient, including applicable screening as appropriate for cardiovascular disease, " diabetes, osteopenia/osteoporosis, and glaucoma.  As appropriate for age/gender, discussed screening for colorectal cancer, prostate cancer, breast cancer, and cervical cancer. Checklist reviewing preventive services available has been given to the patient.    Reviewed patients plan of care and provided an AVS. The Basic Care Plan (routine screening as documented in Health Maintenance) for Giovana meets the Care Plan requirement. This Care Plan has been established and reviewed with the  Patient.      The following health maintenance items are reviewed in Epic and correct as of today:  Health Maintenance   Topic Date Due     ZOSTER IMMUNIZATION (1 of 2) 11/07/2001     PHQ-9  06/17/2020     MEDICARE ANNUAL WELLNESS VISIT  12/17/2020     MAMMO SCREENING  12/17/2020     FALL RISK ASSESSMENT  01/17/2021     DTAP/TDAP/TD IMMUNIZATION (2 - Td) 04/07/2021     LIPID  04/04/2023     ADVANCE CARE PLANNING  12/17/2024     COLORECTAL CANCER SCREENING  01/17/2027     DEXA  Completed     HEPATITIS C SCREENING  Completed     DEPRESSION ACTION PLAN  Completed     MIGRAINE ACTION PLAN  Completed     INFLUENZA VACCINE  Completed     PNEUMOCOCCAL IMMUNIZATION 65+ LOW/MEDIUM RISK  Completed     IPV IMMUNIZATION  Aged Out     MENINGITIS IMMUNIZATION  Aged Out       Sriram Viramontes  [facility]  For any issues my office # is [phone]

## 2020-03-23 NOTE — PROGRESS NOTES
Name from pharmacy: FLUOXETINE 20MG CAPSULES         Will file in chart as: FLUoxetine (PROZAC) 20 MG capsule         Sig: Take 1 capsule by mouth daily.    Original sig: TAKE 1 CAPSULE BY MOUTH DAILY    Disp:  30 capsule    Refills:  5 (Pharmacy requested: Not specified)    Start: 3/22/2020    Class: Eprescribe    Non-formulary    Last ordered: 6 months ago by Steven J Heyden, MD     Rx #: [id number]       To be filled at: [id number] [place], [place] - [id number] AT [facility] OF [address]     APPTS AND LABS ARE UP-TO-DATE  LAST OFFICE VISIT: 12/27/2019  FOLLOW UP APPT: NONE   She also reports having migraines that are only partially helped with current using maxalt. She used sumatriptan in the past without good result. She notes that if she takes too high a dose she gets palpitations and has predisposal to this due to her WPW.

## 2020-05-29 ENCOUNTER — OFFICE VISIT (OUTPATIENT)
Dept: FAMILY MEDICINE | Facility: CLINIC | Age: 70
End: 2020-05-29

## 2020-05-29 VITALS
TEMPERATURE: 98.8 F | HEIGHT: 64 IN | WEIGHT: 159.6 LBS | DIASTOLIC BLOOD PRESSURE: 64 MMHG | RESPIRATION RATE: 16 BRPM | SYSTOLIC BLOOD PRESSURE: 122 MMHG | OXYGEN SATURATION: 97 % | BODY MASS INDEX: 27.25 KG/M2 | HEART RATE: 48 BPM

## 2020-05-29 DIAGNOSIS — E05.00 GRAVES DISEASE: Primary | ICD-10-CM

## 2020-05-29 DIAGNOSIS — H00.011 HORDEOLUM EXTERNUM OF RIGHT UPPER EYELID: ICD-10-CM

## 2020-05-29 DIAGNOSIS — R19.8 ABDOMINAL FULLNESS IN RIGHT UPPER QUADRANT: ICD-10-CM

## 2020-05-29 DIAGNOSIS — R11.0 NAUSEA: ICD-10-CM

## 2020-05-29 LAB
% GRANULOCYTES: 56 % (ref 42.2–75.2)
BACTERIA URINE: NORMAL
BILIRUB UR QL STRIP: NORMAL
BLOOD URINE DIP: NORMAL
CASTS/LPF: NORMAL
COLOR UR: YELLOW
CRYSTAL URINE: NORMAL
EPITHELIAL CELLS - QUEST: NORMAL
GLUCOSE UR STRIP-MCNC: NORMAL MG/DL
HCT VFR BLD AUTO: 41 % (ref 35–46)
HEMOGLOBIN: 14.1 G/DL (ref 11.8–15.5)
KETONES UR QL STRIP: NORMAL
LEUKOCYTE ESTERASE URINE DIP: NORMAL
LYMPHOCYTES NFR BLD AUTO: 35.7 % (ref 20.5–51.1)
MCH RBC QN AUTO: 33.3 PG (ref 27–31)
MCHC RBC AUTO-ENTMCNC: 34.5 G/DL (ref 33–37)
MCV RBC AUTO: 96.5 FL (ref 80–100)
MONOCYTES NFR BLD AUTO: 8.3 % (ref 1.7–9.3)
MUCOUS URINE: NORMAL
NITRITE UR QL STRIP: NORMAL
PH UR STRIP: 5.5 PH (ref 5–9)
PLATELET # BLD AUTO: 201 K/UL (ref 140–450)
PROT UR QL: NORMAL MG/DL (ref ?–0.01)
RBC # BLD AUTO: 4.24 X10/CMM (ref 3.7–5.2)
RBC URINE: NORMAL
SP GR UR STRIP: 1.01 (ref 1–1.02)
UROBILINOGEN UR QL STRIP: 0.2 EU/DL (ref 0.2–1)
WBC # BLD AUTO: 6 X10/CMM (ref 3.8–11)
WBC URINE: NORMAL

## 2020-05-29 PROCEDURE — 81003 URINALYSIS AUTO W/O SCOPE: CPT | Performed by: FAMILY MEDICINE

## 2020-05-29 PROCEDURE — 85025 COMPLETE CBC W/AUTO DIFF WBC: CPT | Performed by: FAMILY MEDICINE

## 2020-05-29 PROCEDURE — 36415 COLL VENOUS BLD VENIPUNCTURE: CPT | Performed by: FAMILY MEDICINE

## 2020-05-29 PROCEDURE — 99214 OFFICE O/P EST MOD 30 MIN: CPT | Performed by: FAMILY MEDICINE

## 2020-05-29 RX ORDER — ERYTHROMYCIN 5 MG/G
0.5 OINTMENT OPHTHALMIC 3 TIMES DAILY
Qty: 3.5 G | Refills: 0 | Status: SHIPPED | OUTPATIENT
Start: 2020-05-29 | End: 2020-12-18

## 2020-05-29 ASSESSMENT — MIFFLIN-ST. JEOR: SCORE: 1238.94

## 2020-05-29 NOTE — PROGRESS NOTES
Problem(s) Oriented visit        SUBJECTIVE:                                                    Giovana Key is a 69 year old female who presents to clinic today for the following health issues :    Right side pain: Has noted the symptoms for about a month.  She denies any significant pain but states that she has a sensation of fullness on the right side with occasional slight discomfort.  It is associated with occasional nausea and dizziness.  She denies any hematuria, fever, chills, chest pain or shortness of breath.  She has tried charcoal and over-the-counter gas meds which has not made a significant improvement.  No bowel changes.  She is status post cholecystectomy.  She is also status post hysterectomy.  No history of kidney stones.    She has a history of Graves' disease that was managed medically per her report and is now on thyroid hormone replacement.  She denies any new symptoms.    She also notes right upper eyelid swelling that is been mild.  She felt like 1 of the glands on the eyelid margin was slightly swollen.  No drainage.  No visual symptoms.        Problem list, Medication list, Allergies, and Medical/Social/Surgical histories reviewed in [facility] and updated as appropriate.   Additional history: as documented    ROS:  10 point ROS completed and negative except noted above, including Gen, HEENT, CV, Resp, GI, , MS, Neurologic, Psych    Histories:   Patient Active Problem List   Diagnosis     heel pain     Peroneal tendonitis     Health Care Home     Advanced directives, counseling/discussion     Diverticulitis of colon     Graves disease     Pharyngeal dysphagia     Migraine with aura and without status migrainosus, not intractable     Chronic congestion of paranasal sinus     Past Surgical History:   Procedure Laterality Date     CHOLECYSTECTOMY       COSMETIC SURGERY      breast augmentation     ENDOSCOPIC RETROGRADE CHOLANGIOPANCREATOGRAM N/A 6/2/2017    Procedure: COMBINED ENDOSCOPIC  "RETROGRADE CHOLANGIOPANCREATOGRAPHY, SPHINCTEROTOMY;  ENDOSCOPIC RETROGRADE CHOLANGIOPANCREATOGRAM (ERCP) STONE REMOVAL, SPINCHTEROTOMY;  Surgeon: Preston Barnett MD;  Location:  OR     ENT SURGERY  age 26    tonsillectomy     ESOPHAGOSCOPY, GASTROSCOPY, DUODENOSCOPY (EGD), COMBINED N/A 2017    Procedure: COMBINED ENDOSCOPIC ULTRASOUND, ESOPHAGOSCOPY, GASTROSCOPY, DUODENOSCOPY (EGD);  ENDOSCOPIC ULTRASOUND, ESOPHAGOSCOPY, GASTROSCOPY, DUODENOSCOPY (EGD) (MAC SEDATION) ;  Surgeon: Dot Vargas MD;  Location:  GI     ESOPHAGOSCOPY, GASTROSCOPY, DUODENOSCOPY (EGD), COMBINED N/A 2017    Procedure: COMBINED ESOPHAGOSCOPY, GASTROSCOPY, DUODENOSCOPY (EGD), BIOPSY SINGLE OR MULTIPLE;;  Surgeon: Dot Vargas MD;  Location:  GI     GYN SURGERY      tubal ligation x2     HYSTERECTOMY VAGINAL      Dr Grier       Social History     Tobacco Use     Smoking status: Former Smoker     Types: Cigarettes     Last attempt to quit: 1983     Years since quittin.4     Smokeless tobacco: Never Used   Substance Use Topics     Alcohol use: Yes     Alcohol/week: 0.0 standard drinks     Comment: 1 drink nightly     Family History   Problem Relation Age of Onset     Hypertension Other      Hypertension Mother 89     No Known Problems Father      No Known Problems Sister      No Known Problems Brother      No Known Problems Maternal Grandmother      No Known Problems Maternal Grandfather      No Known Problems Paternal Grandmother            OBJECTIVE:                                                    /64   Pulse (!) 48   Temp 98.8  F (37.1  C) (Oral)   Resp 16   Ht 1.626 m (5' 4\")   Wt 72.4 kg (159 lb 9.6 oz)   SpO2 97%   BMI 27.40 kg/m    Body mass index is 27.4 kg/m .   APPEARANCE: = Relaxed and in no distress  Conj/Eyelids = very slight swelling of the right upper eyelid.  No significant erythema.  Otherwise clear  Oropharynx = No leukoplakia, No injection to the " tissues, Normal Uvula  Resp effort = Calm regular breathing  Breath Sounds = Good air movement with no rales or rhonchi in any lung fields  Heart Rate, Rhythm, & sounds (no Murm)  = Regular rate and rhythm with no S3, S4, or murmur appreciated.  Abdomen: Soft, nondistended.  There is very mild tenderness in the right upper quadrant focally to palpation.  No hepatomegaly appreciated.  No other masses appreciated.  No rebound or guarding.  Mood/Affect = Cooperative and interested     ASSESSMENT/PLAN:                                                      Abdominal fullness in right upper quadrant: Uncertain etiology of patient's symptoms.  I am reassured that symptoms have been ongoing for quite some time without worsening.  However, we also discussed that it is unusual for symptoms to last this long.  No red flag or alarm symptoms.  Will obtain labs as noted.  UA and CBC normal, CMP pending. Pending her clinical course and results will consider abdominal imaging for further evaluation.  -     CBC with Diff/Plt ([facility])  -     Comp. Metabolic Panel (14) ([facility])  -     Urinalysis w/reflex protein, bili ([facility])    Graves disease: Stable, no changes    Nausea: Mild, see above  -     CBC with Diff/Plt ([facility])  -     Comp. Metabolic Panel (14) ([facility])  -     Urinalysis w/reflex protein, bili ([facility])    Hordeolum externum of right upper eyelid: Very mild.  Follow-up if not improved or worsening.  Reassurance provided.  -     erythromycin (ROMYCIN) 5 MG/GM ophthalmic ointment; Place 0.5 inches into the right eye 3 times daily        ASSESSMENT/PLAN:       The following health maintenance items are reviewed in Epic and correct as of today:  Health Maintenance   Topic Date Due     ZOSTER IMMUNIZATION (1 of 2) 11/07/2001     MEDICARE ANNUAL WELLNESS VISIT  12/17/2020     MAMMO SCREENING  12/17/2020     FALL RISK ASSESSMENT  01/17/2021     DTAP/TDAP/TD IMMUNIZATION (2 - Td) 04/07/2021     LIPID  04/04/2023     ADVANCE CARE PLANNING   12/17/2024     COLORECTAL CANCER SCREENING  01/17/2027     DEXA  Completed     HEPATITIS C SCREENING  Completed     DEPRESSION ACTION PLAN  Completed     MIGRAINE ACTION PLAN  Completed     INFLUENZA VACCINE  Completed     PNEUMOCOCCAL IMMUNIZATION 65+ LOW/MEDIUM RISK  Completed     IPV IMMUNIZATION  Aged Out     MENINGITIS IMMUNIZATION  Aged Out       Gato Vincent MD  [facility]  [phone]    For any issues my office # is [phone]

## 2020-05-30 LAB
ALBUMIN SERPL-MCNC: 4.2 G/DL (ref 3.8–4.8)
ALBUMIN/GLOB SERPL: 2.3 {RATIO} (ref 1.2–2.2)
ALP SERPL-CCNC: 66 IU/L (ref 39–117)
ALT SERPL-CCNC: 23 IU/L (ref 0–32)
AST SERPL-CCNC: 21 IU/L (ref 0–40)
BILIRUB SERPL-MCNC: 0.5 MG/DL (ref 0–1.2)
BUN SERPL-MCNC: 11 MG/DL (ref 8–27)
BUN/CREATININE RATIO: 16 (ref 12–28)
CALCIUM SERPL-MCNC: 8.7 MG/DL (ref 8.7–10.3)
CHLORIDE SERPLBLD-SCNC: 104 MMOL/L (ref 96–106)
CREAT SERPL-MCNC: 0.69 MG/DL (ref 0.57–1)
EGFR IF AFRICN AM: 103 ML/MIN/1.73
EGFR IF NONAFRICN AM: 90 ML/MIN/1.73
GLOBULIN, TOTAL: 1.8 G/DL (ref 1.5–4.5)
GLUCOSE SERPL-MCNC: 84 MG/DL (ref 65–99)
POTASSIUM SERPL-SCNC: 4 MMOL/L (ref 3.5–5.2)
PROT SERPL-MCNC: 6 G/DL (ref 6–8.5)
SODIUM SERPL-SCNC: 144 MMOL/L (ref 134–144)
TOTAL CO2: 25 MMOL/L (ref 20–29)

## 2020-06-01 ENCOUNTER — TELEPHONE (OUTPATIENT)
Dept: FAMILY MEDICINE | Facility: CLINIC | Age: 70
End: 2020-06-01

## 2020-06-01 NOTE — TELEPHONE ENCOUNTER
Called patient to inform her of lab results.  Informed her that all of her labs-urine test, blood counts and kidney and liver tests were all normal. If her pain persists it is recommended she get an US of RUQ.   If that is normal then she should follow up to discuss getting a CT.scan. Patient will call back if pain persists.

## 2020-07-10 ENCOUNTER — TELEPHONE (OUTPATIENT)
Dept: FAMILY MEDICINE | Facility: CLINIC | Age: 70
End: 2020-07-10

## 2020-07-10 NOTE — TELEPHONE ENCOUNTER
Prior authorization submitted via covermymeds-approved. Will wait for fax confirmation. Tammie Harvey

## 2020-08-21 NOTE — PROGRESS NOTES
SUBJECTIVE:   Giovana Key is a 68 year old female presenting with a chief complaint of facial pain/pressure.  Onset of symptoms was 1 month(s) ago.  Course of illness is same.    Severity moderate  Current and Associated symptoms: cough - non-productive, sore throat, fatigue and dizziness  Treatment measures tried include None tried. Only nasal saline was and gargle with salt water.  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 Max 4 tablets/24 hours. 18 tablet 1     fluticasone (FLONASE) 50 MCG/ACT spray Spray 1-2 sprays into both nostrils daily 1 Bottle 3     Levothyroxine Sodium (SYNTHROID PO) Take 88 mcg by mouth daily       Liothyronine Sodium (CYTOMEL PO) Take 10 mcg by mouth daily 2  @ 5 mcg tablets = 10 mcg/d       MOTRIN IB PO Take 200 mg by mouth as needed for moderate pain       Pediatric Multivit-Minerals-C (MULTIVITAMINS PEDIATRIC PO) Take 2 tablets by mouth daily       TESTOSTERONE IM Inject 0.2 mLs into the muscle See Admin Instructions  q 2 weeks       UNABLE TO FIND 15 % daily MEDICATION NAME: progesterone  cream  Applies a 1/4 teaspoon daily       Social History     Tobacco Use     Smoking status: Former Smoker     Types: Cigarettes     Last attempt to quit: 1983     Years since quittin.1     Smokeless tobacco: Never Used   Substance Use Topics     Alcohol use: Yes     Alcohol/week: 0.0 oz     Comment: occasionally       ROS:  CONSTITUTIONAL:NEGATIVE  for chills and fever  INTEGUMENTARY/SKIN: NEGATIVE for worrisome rashes, moles or lesions    OBJECTIVE:  /60   Pulse (!) 48   Temp 98.6  F (37  C) (Temporal)   Resp 16   Wt 75.8 kg (167 lb)   SpO2 97%   BMI 29.12 kg/m    GENERAL APPEARANCE: healthy, alert and no distress  EYES: EOMI,  PERRL, conjunctiva clear  HENT: ear canals and TM's normal.  Nose and mouth without ulcers, erythema or lesions  NECK: supple, nontender, no lymphadenopathy  RESP: lungs clear to auscultation - no rales, rhonchi or wheezes  CV: regular rates and rhythm, normal S1 S2, no murmur noted  NEURO: Normal strength and tone, sensory exam grossly normal,  normal speech and mentation  SKIN: no suspicious lesions or rashes    ASSESSMENT:  1. Acute sinusitis with symptoms > 10 days  Symptomatic cares were discussed in detail.   Pt instructed to come back to the clinic for worsening sx    - azithromycin (ZITHROMAX) 250 MG tablet; Take 2 tablets (500 mg) by mouth daily for 1 day, THEN 1 tablet (250 mg) daily for 4 days.  Dispense: 6 tablet; Refill: 0      No

## 2020-10-14 ENCOUNTER — VIRTUAL VISIT (OUTPATIENT)
Dept: FAMILY MEDICINE | Facility: CLINIC | Age: 70
End: 2020-10-14

## 2020-10-14 DIAGNOSIS — J01.90 ACUTE NON-RECURRENT SINUSITIS, UNSPECIFIED LOCATION: Primary | ICD-10-CM

## 2020-10-14 PROCEDURE — 99213 OFFICE O/P EST LOW 20 MIN: CPT | Mod: 95 | Performed by: NURSE PRACTITIONER

## 2020-10-14 RX ORDER — FLUTICASONE PROPIONATE 50 MCG
1 SPRAY, SUSPENSION (ML) NASAL DAILY
Qty: 15.8 ML | Refills: 1 | Status: SHIPPED | OUTPATIENT
Start: 2020-10-14 | End: 2021-06-18

## 2020-10-14 NOTE — PATIENT INSTRUCTIONS
Recommend:  Claritin-D - follow instructions on box  Flonase nasal spray - 1 spray in each nostril 1-2 times daily  Lots of water and rest  Follow-up Monday if not improving (antibiotic)    Sinus pressure is primarily a problem of drainage.  You can best help your body clear the sinus secretions and pressure by opening up the natural passageways which are often blocked by viral colds and allergies.      Short courses of a nasal decongestant spray (Afrin or Neosinephrine) are one of the most effective tools in opening sinus drainage passageways.  Their use should be restricted to 3 days though due to the high risk of nasal addiction.  Pseudoephedrine or phenylephrine (Sudafed) is often helpful but it can cause elevations in blood pressure and insomnia.     Sometimes a nasal saline spray will help rinse out the nasal passages and feel good.     Guaifenesin (Robitussin or Mucinex) helps loosen secretions and often help make the mucous more liquid and easier to clear.    For pain and fevers, acetaminophen (Tylenol) is most appropriate.  Ibuprofen (Advil) or naproxen (Aleve) are useful too and last longer but they can cause elevation of blood pressure or stomach problems.    Antihistamines (Benadryl, Dimetapp, etc.) cause sedation, confusion, bowel and urinary abnormalities and are of little use for infectious causes of cough and nasal congestion.  Their use should be reserved for allergic symptoms.    The body needs to be treated well in order to help heal itself.  Rest as needed.  It is ok to reduce food intake if appetite is poor but it is quite important to maintain/increase fluid intake.  Sinus pressure and infections usually go away on their own with appropriate care.  If symptoms worsen or persist beyond 10 days, an antibiotic might be worth trying to treat a possible bacterial component.

## 2020-10-14 NOTE — PROGRESS NOTES
Problem(s) Oriented visit      Phone-Visit Details    Type of service:  Phone Visit    Phone Start Time (time phone started): 1116    Phone End Time (time phone stopped): 1128    Originating Location (pt. Location): Home    Distant Location (provider location):  [facility]     Mode of Communication: Phone conference    Physician has received verbal consent for a Phone Visit from the patient? Yes    This was a virtual phone visit conducted during COVID-19 outbreak in regulation with social distancing and quarantine recommendations of the CDC and [place] department of health and human services. A two way audio/video connection was used in real time with patient's consent.    AIMEE Rivera CNP      SUBJECTIVE:                                                    Giovana Key is a 69 year old female who presents to clinic today for the following health issues :    Symptoms started 3 days ago - headache above eyebrows. Does have history of migraines. Left cheek nose area painful. Fatigue. No loss of appetite, no fever - highest 98.4. Lot of mucous down back of throat. Took one Motrin last night. Has also tried Benadryl. Nausea without vomiting. Little bit of dizziness. Decreased taste but not gone. Slight cough with drainage. Denies shortness of breath, sore throat, diarrhea.    Problem list, Medication list, Allergies, and Medical/Social/Surgical histories reviewed in [facility] and updated as appropriate.   Additional history: as documented    ROS:  6 point ROS completed and negative except noted above, including Gen, HEENT, CV, Resp, GI    Histories:   Patient Active Problem List   Diagnosis     heel pain     Peroneal tendonitis     Health Care Home     Advanced directives, counseling/discussion     Diverticulitis of colon     Graves disease     Pharyngeal dysphagia     Migraine with aura and without status migrainosus, not intractable     Chronic congestion of paranasal sinus     Past Surgical History:    Procedure Laterality Date     CHOLECYSTECTOMY       COSMETIC SURGERY      breast augmentation     ENDOSCOPIC RETROGRADE CHOLANGIOPANCREATOGRAM N/A 2017    Procedure: COMBINED ENDOSCOPIC RETROGRADE CHOLANGIOPANCREATOGRAPHY, SPHINCTEROTOMY;  ENDOSCOPIC RETROGRADE CHOLANGIOPANCREATOGRAM (ERCP) STONE REMOVAL, SPINCHTEROTOMY;  Surgeon: Preston Barnett MD;  Location:  OR     ENT SURGERY  age 26    tonsillectomy     ESOPHAGOSCOPY, GASTROSCOPY, DUODENOSCOPY (EGD), COMBINED N/A 2017    Procedure: COMBINED ENDOSCOPIC ULTRASOUND, ESOPHAGOSCOPY, GASTROSCOPY, DUODENOSCOPY (EGD);  ENDOSCOPIC ULTRASOUND, ESOPHAGOSCOPY, GASTROSCOPY, DUODENOSCOPY (EGD) (MAC SEDATION) ;  Surgeon: Dot Vargas MD;  Location:  GI     ESOPHAGOSCOPY, GASTROSCOPY, DUODENOSCOPY (EGD), COMBINED N/A 2017    Procedure: COMBINED ESOPHAGOSCOPY, GASTROSCOPY, DUODENOSCOPY (EGD), BIOPSY SINGLE OR MULTIPLE;;  Surgeon: Dot Vargas MD;  Location:  GI     GYN SURGERY      tubal ligation x2     HYSTERECTOMY VAGINAL      Dr Grier       Social History     Tobacco Use     Smoking status: Former Smoker     Types: Cigarettes     Quit date: 1983     Years since quittin.8     Smokeless tobacco: Never Used   Substance Use Topics     Alcohol use: Yes     Alcohol/week: 0.0 standard drinks     Comment: 1 drink nightly     Family History   Problem Relation Age of Onset     Hypertension Other      Hypertension Mother 89     No Known Problems Father      No Known Problems Sister      No Known Problems Brother      No Known Problems Maternal Grandmother      No Known Problems Maternal Grandfather      No Known Problems Paternal Grandmother            OBJECTIVE:                                                    No vitals taken due to telehealth visit    General: Elderly woman sounds congested but no distress  Resp: Speaking in full sentences, no coughing  Psych: Normal mood     ASSESSMENT/PLAN:                                                         Giovana was seen today for sinus problem.    Diagnoses and all orders for this visit:    Acute non-recurrent sinusitis, unspecified location  -     fluticasone (FLONASE) 50 MCG/ACT nasal spray; Spray 1 spray into both nostrils daily        See Patient Instructions  Patient Instructions   Recommend:  Claritin-D - follow instructions on box  Flonase nasal spray - 1 spray in each nostril 1-2 times daily  Lots of water and rest  Follow-up Monday if not improving (antibiotic)    Sinus pressure is primarily a problem of drainage.  You can best help your body clear the sinus secretions and pressure by opening up the natural passageways which are often blocked by viral colds and allergies.      Short courses of a nasal decongestant spray (Afrin or Neosinephrine) are one of the most effective tools in opening sinus drainage passageways.  Their use should be restricted to 3 days though due to the high risk of nasal addiction.  Pseudoephedrine or phenylephrine (Sudafed) is often helpful but it can cause elevations in blood pressure and insomnia.     Sometimes a nasal saline spray will help rinse out the nasal passages and feel good.     Guaifenesin (Robitussin or Mucinex) helps loosen secretions and often help make the mucous more liquid and easier to clear.    For pain and fevers, acetaminophen (Tylenol) is most appropriate.  Ibuprofen (Advil) or naproxen (Aleve) are useful too and last longer but they can cause elevation of blood pressure or stomach problems.    Antihistamines (Benadryl, Dimetapp, etc.) cause sedation, confusion, bowel and urinary abnormalities and are of little use for infectious causes of cough and nasal congestion.  Their use should be reserved for allergic symptoms.    The body needs to be treated well in order to help heal itself.  Rest as needed.  It is ok to reduce food intake if appetite is poor but it is quite important to maintain/increase fluid intake.  Sinus pressure  and infections usually go away on their own with appropriate care.  If symptoms worsen or persist beyond 10 days, an antibiotic might be worth trying to treat a possible bacterial component.        The following health maintenance items are reviewed in Epic and correct as of today:  Health Maintenance   Topic Date Due     ZOSTER IMMUNIZATION (1 of 2) 11/07/2001     INFLUENZA VACCINE (1) 09/01/2020     MEDICARE ANNUAL WELLNESS VISIT  12/17/2020     MAMMO SCREENING  12/17/2020     FALL RISK ASSESSMENT  01/17/2021     DTAP/TDAP/TD IMMUNIZATION (2 - Td) 04/07/2021     LIPID  04/04/2023     ADVANCE CARE PLANNING  12/17/2024     COLORECTAL CANCER SCREENING  01/17/2027     DEXA  Completed     HEPATITIS C SCREENING  Completed     DEPRESSION ACTION PLAN  Completed     MIGRAINE ACTION PLAN  Completed     Pneumococcal Vaccine: 65+ Years  Completed     Pneumococcal Vaccine: Pediatrics (0 to 5 Years) and At-Risk Patients (6 to 64 Years)  Aged Out     IPV IMMUNIZATION  Aged Out     MENINGITIS IMMUNIZATION  Aged Out     HEPATITIS B IMMUNIZATION  Aged Out       AIMEE Rivera CNP  [facility]  Family Practice  [facility]  [phone]    For any issues my office # is [phone]

## 2020-12-18 ENCOUNTER — OFFICE VISIT (OUTPATIENT)
Dept: FAMILY MEDICINE | Facility: CLINIC | Age: 70
End: 2020-12-18

## 2020-12-18 VITALS
OXYGEN SATURATION: 95 % | SYSTOLIC BLOOD PRESSURE: 112 MMHG | BODY MASS INDEX: 26.29 KG/M2 | HEIGHT: 64 IN | DIASTOLIC BLOOD PRESSURE: 72 MMHG | TEMPERATURE: 98.1 F | HEART RATE: 61 BPM | WEIGHT: 154 LBS

## 2020-12-18 DIAGNOSIS — H02.59 SUPERFICIAL SWELLING OF EYELID: Primary | ICD-10-CM

## 2020-12-18 DIAGNOSIS — H10.31 ACUTE BACTERIAL CONJUNCTIVITIS OF RIGHT EYE: ICD-10-CM

## 2020-12-18 PROCEDURE — 99213 OFFICE O/P EST LOW 20 MIN: CPT | Performed by: NURSE PRACTITIONER

## 2020-12-18 RX ORDER — LIOTHYRONINE SODIUM 5 MCG
TABLET ORAL
COMMUNITY
Start: 2020-11-25 | End: 2021-06-18

## 2020-12-18 RX ORDER — LEVOTHYROXINE SODIUM 88 MCG
TABLET ORAL
COMMUNITY
Start: 2020-10-14 | End: 2020-12-18

## 2020-12-18 RX ORDER — ERYTHROMYCIN 5 MG/G
0.5 OINTMENT OPHTHALMIC 3 TIMES DAILY
Qty: 3.5 G | Refills: 1 | Status: SHIPPED | OUTPATIENT
Start: 2020-12-18 | End: 2020-12-25

## 2020-12-18 ASSESSMENT — MIFFLIN-ST. JEOR: SCORE: 1208.54

## 2020-12-18 NOTE — PROGRESS NOTES
Problem(s) Oriented visit        SUBJECTIVE:                                                    Giovana Key is a 70 year old female who presents to clinic today for the following health issues :    Both upper eyelids feel swollen. States this has been present since this summer but worsening. Now right eye looks red. Some discharge present. No pain or changes in vision. No exposure to others with similar symptoms. Did recently change mascara in the past few weeks but does not think that is the cause. Has history of allergies. Taking benadryl at night which has helped some. Also has history of Graves disease which is why she says her eyes are bulging.    Problem list, Medication list, Allergies, and Medical/Social/Surgical histories reviewed in EPIC and updated as appropriate.   Additional history: as documented    ROS:  3 point ROS completed and negative except noted above, including Gen, HEENT    Histories:   Patient Active Problem List   Diagnosis     Peroneal tendonitis     Advanced directives, counseling/discussion     Diverticulitis of colon     Graves disease     Pharyngeal dysphagia     Migraine with aura and without status migrainosus, not intractable     Chronic congestion of paranasal sinus     Past Surgical History:   Procedure Laterality Date     CHOLECYSTECTOMY       COSMETIC SURGERY      breast augmentation     ENDOSCOPIC RETROGRADE CHOLANGIOPANCREATOGRAM N/A 6/2/2017    Procedure: COMBINED ENDOSCOPIC RETROGRADE CHOLANGIOPANCREATOGRAPHY, SPHINCTEROTOMY;  ENDOSCOPIC RETROGRADE CHOLANGIOPANCREATOGRAM (ERCP) STONE REMOVAL, SPINCHTEROTOMY;  Surgeon: Preston Barnett MD;  Location:  OR     ENT SURGERY  age 26    tonsillectomy     ESOPHAGOSCOPY, GASTROSCOPY, DUODENOSCOPY (EGD), COMBINED N/A 5/23/2017    Procedure: COMBINED ENDOSCOPIC ULTRASOUND, ESOPHAGOSCOPY, GASTROSCOPY, DUODENOSCOPY (EGD);  ENDOSCOPIC ULTRASOUND, ESOPHAGOSCOPY, GASTROSCOPY, DUODENOSCOPY (EGD) (MAC SEDATION) ;  Surgeon: Sam  "Dot Lloyd MD;  Location:  GI     ESOPHAGOSCOPY, GASTROSCOPY, DUODENOSCOPY (EGD), COMBINED N/A 2017    Procedure: COMBINED ESOPHAGOSCOPY, GASTROSCOPY, DUODENOSCOPY (EGD), BIOPSY SINGLE OR MULTIPLE;;  Surgeon: Dot Vargas MD;  Location:  GI     GYN SURGERY      tubal ligation x2     HYSTERECTOMY VAGINAL      Dr Grier       Social History     Tobacco Use     Smoking status: Former Smoker     Types: Cigarettes     Quit date: 1983     Years since quittin.9     Smokeless tobacco: Never Used   Substance Use Topics     Alcohol use: Yes     Alcohol/week: 0.0 standard drinks     Comment: 1 drink nightly     Family History   Problem Relation Age of Onset     Hypertension Other      Hypertension Mother 89     No Known Problems Father      No Known Problems Sister      No Known Problems Brother      No Known Problems Maternal Grandmother      No Known Problems Maternal Grandfather      No Known Problems Paternal Grandmother            OBJECTIVE:                                                    /72   Pulse 61   Temp 98.1  F (36.7  C)   Ht 1.626 m (5' 4\")   Wt 69.9 kg (154 lb)   SpO2 95%   BMI 26.43 kg/m    Body mass index is 26.43 kg/m .   GENERAL: healthy, alert and no distress  EYES: Bilateral exophthalmos, upper eyelids slightly erythematous along lash line, conjunctiva on right with mild erythema and watering  HENT: Normal external ears and nose  Resp: normal work of breathing  Cards: normal rate  Psych: normal affect & mood     ASSESSMENT/PLAN:                                                      Giovana was seen today for consult and musculoskeletal problem.    Diagnoses and all orders for this visit:    Superficial swelling of eyelid  -     EYE ADULT REFERRAL; Future    Acute bacterial conjunctivitis of right eye  -     erythromycin (ROMYCIN) 5 MG/GM ophthalmic ointment; Place 0.5 inches into both eyes 3 times daily for 7 days        See Patient Instructions  Patient " Instructions   Take Cetirizine 10 mg once daily (Zyrtec)  Take Benadryl at night as needed    Erythromycin ointment in eye 3 times daily for 5-7 days    Make ophthalmology appointment.      The following health maintenance items are reviewed in Epic and correct as of today:  Health Maintenance   Topic Date Due     ZOSTER IMMUNIZATION (1 of 2) 11/07/2001     MEDICARE ANNUAL WELLNESS VISIT  12/17/2020     MAMMO SCREENING  12/17/2020     FALL RISK ASSESSMENT  01/17/2021     DTAP/TDAP/TD IMMUNIZATION (2 - Td) 04/07/2021     LIPID  04/04/2023     ADVANCE CARE PLANNING  12/17/2024     COLORECTAL CANCER SCREENING  01/17/2027     DEXA  12/04/2033     HEPATITIS C SCREENING  Completed     DEPRESSION ACTION PLAN  Completed     MIGRAINE ACTION PLAN  Completed     INFLUENZA VACCINE  Completed     Pneumococcal Vaccine: 65+ Years  Completed     Pneumococcal Vaccine: Pediatrics (0 to 5 Years) and At-Risk Patients (6 to 64 Years)  Aged Out     IPV IMMUNIZATION  Aged Out     MENINGITIS IMMUNIZATION  Aged Out     HEPATITIS B IMMUNIZATION  Aged Out       AIMEE Rivera CNP  [facility]  Family Practice  [facility]  [phone]    For any issues my office # is [phone]

## 2020-12-18 NOTE — PATIENT INSTRUCTIONS
Take Cetirizine 10 mg once daily (Zyrtec)  Take Benadryl at night as needed    Erythromycin ointment in eye 3 times daily for 5-7 days    Make ophthalmology appointment.

## 2021-01-14 ENCOUNTER — ANCILLARY PROCEDURE (OUTPATIENT)
Dept: MAMMOGRAPHY | Facility: CLINIC | Age: 71
End: 2021-01-14
Attending: OBSTETRICS & GYNECOLOGY
Payer: COMMERCIAL

## 2021-01-14 DIAGNOSIS — Z12.31 VISIT FOR SCREENING MAMMOGRAM: ICD-10-CM

## 2021-01-14 PROCEDURE — 77063 BREAST TOMOSYNTHESIS BI: CPT | Mod: TC | Performed by: RADIOLOGY

## 2021-01-14 PROCEDURE — 77067 SCR MAMMO BI INCL CAD: CPT | Mod: TC | Performed by: RADIOLOGY

## 2021-02-10 ENCOUNTER — TRANSFERRED RECORDS (OUTPATIENT)
Dept: FAMILY MEDICINE | Facility: CLINIC | Age: 71
End: 2021-02-10

## 2021-03-03 DIAGNOSIS — Z23 HIGH PRIORITY FOR COVID-19 VIRUS VACCINATION: Primary | ICD-10-CM

## 2021-03-03 DIAGNOSIS — T78.40XD ALLERGIC REACTION, SUBSEQUENT ENCOUNTER: Primary | ICD-10-CM

## 2021-03-03 PROCEDURE — 0011A PR COVID VAC MODERNA 100 MCG/0.5 ML IM: CPT | Performed by: FAMILY MEDICINE

## 2021-03-03 PROCEDURE — 91301 PR COVID VAC MODERNA 100 MCG/0.5 ML IM: CPT | Performed by: FAMILY MEDICINE

## 2021-03-03 RX ORDER — EPINEPHRINE 0.3 MG/.3ML
0.3 INJECTION SUBCUTANEOUS PRN
Qty: 0.6 ML | Refills: 0 | Status: SHIPPED | OUTPATIENT
Start: 2021-03-03 | End: 2022-02-03

## 2021-03-24 ENCOUNTER — TRANSFERRED RECORDS (OUTPATIENT)
Dept: FAMILY MEDICINE | Facility: CLINIC | Age: 71
End: 2021-03-24

## 2021-03-31 DIAGNOSIS — Z23 HIGH PRIORITY FOR COVID-19 VIRUS VACCINATION: Primary | ICD-10-CM

## 2021-03-31 PROCEDURE — 0012A PR COVID VAC MODERNA 100 MCG/0.5 ML IM: CPT | Performed by: FAMILY MEDICINE

## 2021-03-31 PROCEDURE — 91301 PR COVID VAC MODERNA 100 MCG/0.5 ML IM: CPT | Performed by: FAMILY MEDICINE

## 2021-05-13 DIAGNOSIS — G43.109 MIGRAINE WITH AURA AND WITHOUT STATUS MIGRAINOSUS, NOT INTRACTABLE: ICD-10-CM

## 2021-05-13 RX ORDER — ELETRIPTAN HYDROBROMIDE 20 MG/1
20-40 TABLET, FILM COATED ORAL
Qty: 18 TABLET | Refills: 1 | Status: SHIPPED | OUTPATIENT
Start: 2021-05-13 | End: 2022-02-09

## 2021-06-18 ENCOUNTER — OFFICE VISIT (OUTPATIENT)
Dept: FAMILY MEDICINE | Facility: CLINIC | Age: 71
End: 2021-06-18

## 2021-06-18 VITALS
HEIGHT: 64 IN | WEIGHT: 153 LBS | OXYGEN SATURATION: 96 % | HEART RATE: 61 BPM | SYSTOLIC BLOOD PRESSURE: 120 MMHG | DIASTOLIC BLOOD PRESSURE: 72 MMHG | BODY MASS INDEX: 26.12 KG/M2

## 2021-06-18 DIAGNOSIS — F43.23 ADJUSTMENT DISORDER WITH MIXED ANXIETY AND DEPRESSED MOOD: ICD-10-CM

## 2021-06-18 DIAGNOSIS — R07.89 CHEST TIGHTNESS: Primary | ICD-10-CM

## 2021-06-18 DIAGNOSIS — J34.89 NASAL DRAINAGE: ICD-10-CM

## 2021-06-18 DIAGNOSIS — R13.10 ABNORMAL SWALLOWING: ICD-10-CM

## 2021-06-18 DIAGNOSIS — H92.02 EAR DISCOMFORT, LEFT: ICD-10-CM

## 2021-06-18 DIAGNOSIS — R39.9 LOWER URINARY TRACT SYMPTOMS (LUTS): ICD-10-CM

## 2021-06-18 LAB
% GRANULOCYTES: 59.7 % (ref 42.2–75.2)
BACTERIA URINE: 0
BILIRUB UR QL STRIP: ABNORMAL
BLOOD URINE DIP: ABNORMAL
CASTS/LPF: 0
COLOR UR: YELLOW
CRYSTAL URINE: 0
EPITHELIAL CELLS - QUEST: ABNORMAL
GLUCOSE UR STRIP-MCNC: ABNORMAL MG/DL
HCT VFR BLD AUTO: 39.6 % (ref 35–46)
HEMOGLOBIN: 13.9 G/DL (ref 11.8–15.5)
KETONES UR QL STRIP: ABNORMAL
LEUKOCYTE ESTERASE URINE DIP: ABNORMAL
LYMPHOCYTES NFR BLD AUTO: 32.1 % (ref 20.5–51.1)
MCH RBC QN AUTO: 32.9 PG (ref 27–31)
MCHC RBC AUTO-ENTMCNC: 35.1 G/DL (ref 33–37)
MCV RBC AUTO: 93.9 FL (ref 80–100)
MONOCYTES NFR BLD AUTO: 8.2 % (ref 1.7–9.3)
MUCOUS URINE: ABNORMAL
NITRITE UR QL STRIP: ABNORMAL
PH UR STRIP: 5 PH (ref 5–9)
PLATELET # BLD AUTO: 209 K/UL (ref 140–450)
PROT UR QL: ABNORMAL MG/DL (ref ?–0.01)
RBC # BLD AUTO: 4.22 X10/CMM (ref 3.7–5.2)
RBC URINE: ABNORMAL (ref 0–3)
SP GR UR STRIP: 1.03 (ref 1–1.02)
UROBILINOGEN UR QL STRIP: 0.2 EU/DL (ref 0.2–1)
WBC # BLD AUTO: 7.1 X10/CMM (ref 3.8–11)
WBC URINE: ABNORMAL (ref 0–3)

## 2021-06-18 PROCEDURE — 93000 ELECTROCARDIOGRAM COMPLETE: CPT | Mod: 59 | Performed by: FAMILY MEDICINE

## 2021-06-18 PROCEDURE — 36415 COLL VENOUS BLD VENIPUNCTURE: CPT | Performed by: FAMILY MEDICINE

## 2021-06-18 PROCEDURE — 99214 OFFICE O/P EST MOD 30 MIN: CPT | Performed by: FAMILY MEDICINE

## 2021-06-18 PROCEDURE — 81003 URINALYSIS AUTO W/O SCOPE: CPT | Performed by: FAMILY MEDICINE

## 2021-06-18 PROCEDURE — 85025 COMPLETE CBC W/AUTO DIFF WBC: CPT | Performed by: FAMILY MEDICINE

## 2021-06-18 RX ORDER — ASPIRIN 81 MG/1
81 TABLET ORAL DAILY
COMMUNITY
End: 2022-09-13

## 2021-06-18 ASSESSMENT — MIFFLIN-ST. JEOR: SCORE: 1196.06

## 2021-06-18 NOTE — LETTER
[facility]  [address] [place]  [address]  Phone: [phone]    June 21, 2021      Giovana SCHMIDT Key  [address]  [facility] [address]            Dear Giovana,     Your urine was overall normal without infectious findings but does appear concentrated (dehydrated) and very small amount of blood.  I would like you to increase hydration and return in 4 weeks to recheck your urine, sooner if any symptoms worsen.  Would like to ensure blood has resolved.       Sincerely,     Gato Vincent MD     Results for orders placed or performed in visit on 06/18/21   Urinalysis w/reflex protein, bili ([facility])     Status: Abnormal   Result Value Ref Range    Color Urine Yellow     pH Urine 5.0 5 - 9 pH    Specific Gravity Urine 1.030 1.005 - 1.025    Protein Urine neg 0.01 mg/dL    Glucose Urine neg     Ketones Urine 1+ (A)     Leukocyte Esterase Urine neg     Blood Urine 2+ (A)     Nitrite Urine Neg NEG    Bilirubin Urine Dip neg     Urobilinogen Urine 0.2 0.2 - 1.0 EU/dL    WBC Urine rare 0 - 3    RBC Urine 0-2 (A) 0 - 3    Epithelial Cells few     Crystal Urine 0     Bacteria Urine 0     Mucous Urine few     Casts/LPF 0    Comp. Metabolic Panel (14) ([facility])     Status: None   Result Value Ref Range    Glucose 91 65 - 99 mg/dL    Urea Nitrogen 17 8 - 27 mg/dL    Creatinine 0.77 0.57 - 1.00 mg/dL    eGFR If NonAfricn Am 79 >59 mL/min/1.73    eGFR If Africn Am 91 >59 mL/min/1.73    BUN/Creatinine Ratio 22 12 - 28    Sodium 144 134 - 144 mmol/L    Potassium 4.0 3.5 - 5.2 mmol/L    Chloride 106 96 - 106 mmol/L    Total CO2 24 20 - 29 mmol/L    Calcium 9.4 8.7 - 10.3 mg/dL    Protein Total 6.4 6.0 - 8.5 g/dL    Albumin 4.3 3.8 - 4.8 g/dL    Globulin, Total 2.1 1.5 - 4.5 g/dL    A/G Ratio 2.0 1.2 - 2.2    Bilirubin Total 0.8 0.0 - 1.2 mg/dL    Alkaline Phosphatase 55 48 - 121 IU/L    AST 16 0 - 40 IU/L    ALT 15 0 - 32 IU/L    Narrative    Performed at:  01 - [facility] [address],  [place], [place]  [id number]  : Chuckie Vela MD, Phone:  [phone]   CBC with Diff/Plt ([facility])     Status: Abnormal   Result Value Ref Range    WBC x10/cmm 7.1 3.8 - 11.0 x10/cmm    % Lymphocytes 32.1 20.5 - 51.1 %    % Monocytes 8.2 1.7 - 9.3 %    % Granulocytes 59.7 42.2 - 75.2 %    RBC x10/cmm 4.22 3.7 - 5.2 x10/cmm    Hemoglobin 13.9 11.8 - 15.5 g/dl    Hematocrit 39.6 35 - 46 %    MCV 93.9 80 - 100 fL    MCH 32.9 (A) 27.0 - 31.0 pg    MCHC 35.1 33.0 - 37.0 g/dL    Platelet Count 209 140 - 450 K/uL   TSH reflex to T4 ([facility])     Status: None   Result Value Ref Range    TSH 0.564 0.450 - 4.500 uIU/mL    Narrative    Performed at:  01 - [facility] [address], [place], [place]  [id number]  : Chuckie Vela MD, Phone:  [phone]

## 2021-06-18 NOTE — PROGRESS NOTES
"  Gia Akhtar is a 70 year old patient who presents to clinic for evaluation.  Reports 2-3 weeks of ear discomfort, nasal drainage, slight chest tightness.  No exertional symptoms.  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  She is under a lot of stress.  Reports remote history of WPW but was not candidate for ablation.  Feels swallowing has been abnormal, especially when lying down.  This is not new.  Sees Dr Gary but has not had this evaluated.  No choking.      Review of Systems   Constitutional, HEENT, cardiovascular, pulmonary, GI, , musculoskeletal, neuro, skin, endocrine and psych systems are negative, except as otherwise noted.      Objective    /72   Pulse 61   Ht 1.613 m (5' 3.5\")   Wt 69.4 kg (153 lb)   SpO2 96%   BMI 26.68 kg/m      General: Well appearing, NAD  HEENT: clear  Heart: RRR, no murmur  Chest: Lungs CTAB  Skin: Clear  MSK: normal  Psych: normal mood and affect        EKG - Reviewed and interpreted by me sinus bradycardia, normal axis, normal intervals, no acute ST/T changes c/w ischemia, no LVH by voltage criteria, unchanged from previous tracings  Results for orders placed or performed in visit on 06/18/21 (from the past 24 hour(s))   Urinalysis w/reflex protein, bili ([facility])   Result Value Ref Range    Color Urine Yellow     pH Urine 5.0 5 - 9 pH    Specific Gravity Urine 1.030 1.005 - 1.025    Protein Urine neg 0.01 mg/dL    Glucose Urine neg     Ketones Urine 1+ (A)     Leukocyte Esterase Urine neg     Blood Urine 2+ (A)     Nitrite Urine Neg NEG    Bilirubin Urine Dip neg     Urobilinogen Urine 0.2 0.2 - 1.0 EU/dL    WBC Urine rare 0 - 3    RBC Urine 0-2 (A) 0 - 3    Epithelial Cells few     Crystal Urine 0     Bacteria Urine 0     Mucous Urine few     Casts/LPF 0    CBC with Diff/Plt ([facility])   Result Value Ref Range    WBC x10/cmm 7.1 3.8 - 11.0 x10/cmm    % Lymphocytes 32.1 20.5 - 51.1 %    % Monocytes 8.2 1.7 - 9.3 %    % Granulocytes " "59.7 42.2 - 75.2 %    RBC x10/cmm 4.22 3.7 - 5.2 x10/cmm    Hemoglobin 13.9 11.8 - 15.5 g/dl    Hematocrit 39.6 35 - 46 %    MCV 93.9 80 - 100 fL    MCH 32.9 (A) 27.0 - 31.0 pg    MCHC 35.1 33.0 - 37.0 g/dL    Platelet Count 209 140 - 450 K/uL       Assessment & Plan     Chest tightness  EKG unremarkable other than bradycardia.  She is not on BB.  If dizziness persists consider cardiology referral  - EKG 12-lead complete w/read - Clinics  - Comp. Metabolic Panel (14) ([facility])  - CBC with Diff/Plt ([facility])  - TSH reflex to T4 ([facility])  - VENOUS COLLECTION    Lower urinary tract symptoms (LUTS)  Very small amount of blood.  No WBC.  Recheck in 1 month, sooner if symptoms increase  - Urinalysis w/reflex protein, bili ([facility])  - Comp. Metabolic Panel (14) ([facility])  - CBC with Diff/Plt ([facility])  - TSH reflex to T4 ([facility])  - VENOUS COLLECTION    Ear discomfort, left  Normal exam  - Comp. Metabolic Panel (14) ([facility])  - CBC with Diff/Plt ([facility])  - TSH reflex to T4 ([facility])  - VENOUS COLLECTION    Nasal drainage  normal  - Comp. Metabolic Panel (14) ([facility])  - CBC with Diff/Plt ([facility])  - TSH reflex to T4 ([facility])  - VENOUS COLLECTION    Adjustment disorder with mixed anxiety and depressed mood  Recommend counseling and close follow up  - CARE COORDINATION REFERRAL  - MENTAL HEALTH REFERRAL  - Adult; Outpatient Treatment; Individual/Couples/Family/Group Therapy/Health Psychology; [facility] [phone]; We will contact you to schedule the appointment or please call with any questions  - VENOUS COLLECTION    Abnormal swallowing  - OTOLARYNGOLOGY REFERRAL  [id number]}     BMI:   Estimated body mass index is 26.68 kg/m  as calculated from the following:    Height as of this encounter: 1.613 m (5' 3.5\").    Weight as of this encounter: 69.4 kg (153 lb).       See Patient Instructions    Return in about 4 weeks (around 7/16/2021) for Follow Up.    Gato Vincent MD  [facility]          "

## 2021-06-19 LAB
ALBUMIN SERPL-MCNC: 4.3 G/DL (ref 3.8–4.8)
ALBUMIN/GLOB SERPL: 2 {RATIO} (ref 1.2–2.2)
ALP SERPL-CCNC: 55 IU/L (ref 48–121)
ALT SERPL-CCNC: 15 IU/L (ref 0–32)
AST SERPL-CCNC: 16 IU/L (ref 0–40)
BILIRUB SERPL-MCNC: 0.8 MG/DL (ref 0–1.2)
BUN SERPL-MCNC: 17 MG/DL (ref 8–27)
BUN/CREATININE RATIO: 22 (ref 12–28)
CALCIUM SERPL-MCNC: 9.4 MG/DL (ref 8.7–10.3)
CHLORIDE SERPLBLD-SCNC: 106 MMOL/L (ref 96–106)
CREAT SERPL-MCNC: 0.77 MG/DL (ref 0.57–1)
EGFR IF AFRICN AM: 91 ML/MIN/1.73
EGFR IF NONAFRICN AM: 79 ML/MIN/1.73
GLOBULIN, TOTAL: 2.1 G/DL (ref 1.5–4.5)
GLUCOSE SERPL-MCNC: 91 MG/DL (ref 65–99)
POTASSIUM SERPL-SCNC: 4 MMOL/L (ref 3.5–5.2)
PROT SERPL-MCNC: 6.4 G/DL (ref 6–8.5)
SODIUM SERPL-SCNC: 144 MMOL/L (ref 134–144)
TOTAL CO2: 24 MMOL/L (ref 20–29)
TSH BLD-ACNC: 0.56 UIU/ML (ref 0.45–4.5)

## 2021-06-21 ENCOUNTER — PATIENT OUTREACH (OUTPATIENT)
Dept: CARE COORDINATION | Facility: CLINIC | Age: 71
End: 2021-06-21

## 2021-06-21 ENCOUNTER — TELEPHONE (OUTPATIENT)
Dept: FAMILY MEDICINE | Facility: CLINIC | Age: 71
End: 2021-06-21

## 2021-06-21 DIAGNOSIS — R31.29 MICROSCOPIC HEMATURIA: ICD-10-CM

## 2021-06-21 DIAGNOSIS — R82.90 ABNORMAL FINDING ON URINALYSIS: Primary | ICD-10-CM

## 2021-06-21 LAB
T4 FREE SERPL-MCNC: 1.22 NG/DL (ref 0.71–1.85)
TSH SERPL-ACNC: 0.32 UIU/ML (ref 0.3–5)

## 2021-06-21 ASSESSMENT — ACTIVITIES OF DAILY LIVING (ADL): DEPENDENT_IADLS:: INDEPENDENT

## 2021-06-21 NOTE — PROGRESS NOTES
Clinic Care Coordination Contact    Clinic Care Coordination Contact  OUTREACH    Referral Information:  Patient is the caregiver for her . She has upcoming knee replacement surgery and may need some help and wants to navigate resources.         Chief Complaint   Patient presents with     Clinic Care Coordination - Initial     SW follow up        Universal Utilization:   Utilization    Last refreshed: 2021 11:50 AM: Hospital Admissions 0           Last refreshed: 2021 11:50 AM: ED Visits 0           Last refreshed: 2021 11:50 AM: No Show Count (past year) 0              Current as of: 2021 11:50 AM            Clinical Concerns:  Current Medical Concerns:  Upcoming knee replacement surgery  Discussed homecare- pt asked her insurance and they will cover with 3 day hospital stay and for PT. Pt will ask her surgeon about this  Pt wanted info about [employer] so she can have meal delivery after surgery  Provided this info.     Current Behavioral Concerns: PCP placed a referral for therapy. Pt is awaiting a call from [facility] to get an intake set up    Medication Management:  Able to set up and administer     Functional Status:  Independent  Will need PT after surgery    Living Situation:  Lives in an apartment with spouse    Lifestyle & Psychosocial Needs:   Pt is 70 and insurance is [employer]. She has 4-5 people she can call if she needs help after surgery    Socioeconomic History     Marital status:      Spouse name: Warren     Number of children: 0     Years of education: Not on file     Highest education level: Not on file   Occupational History     Employer: RETIRED     Tobacco Use     Smoking status: Former Smoker     Types: Cigarettes     Quit date: 1983     Years since quittin.4     Smokeless tobacco: Never Used   Substance and Sexual Activity     Alcohol use: Yes     Alcohol/week: 0.0 standard drinks     Comment: 1 drink nightly     Drug use: No      Sexual activity: Not Currently     Partners: Male     Birth control/protection: Female Surgical     Comment: hysterectomy - no cervix         Advance Care Plan/Directive- not on file    Referrals Placed: Meals on Wheels/[employer]     Future Appointments              In 1 week Gato Vincent MD [facility], [facility]          Plan: Pt is out of network for care coordination but will call in the future with any questions/concerns/needs. Pt will set up homecare and home delivered meals to plan for her surgery recovery    Rosi Rangel, LIVE, [facility]  Clinic Care Coordinator  [email or web address]  [phone]

## 2021-06-21 NOTE — TELEPHONE ENCOUNTER
----- Message from Gato Vincent MD sent at 6/18/2021  5:18 PM CDT -----  Dear Giovana,    Your urine was overall normal without infectious findings but does appear concentrated (dehydrated) and very small amount of blood.  I would like you to increase hydration and return in 4 weeks to recheck your urine, sooner if any symptoms worsen.  Would like to ensure blood has resolved.      Sincerely,    Gato Vincent MD

## 2021-06-21 NOTE — TELEPHONE ENCOUNTER
Called patient and informed of urine result and recommend repeat UA in 4 weeks. Future order placed.   Also informed of blood labs all okay. Faxed thyroid and CMP results to Dr. Henriquez's office per patient request.  Chika Samuel RN

## 2021-06-22 ENCOUNTER — OFFICE VISIT (OUTPATIENT)
Dept: FAMILY MEDICINE | Facility: CLINIC | Age: 71
End: 2021-06-22

## 2021-06-22 ENCOUNTER — TRANSFERRED RECORDS (OUTPATIENT)
Dept: FAMILY MEDICINE | Facility: CLINIC | Age: 71
End: 2021-06-22

## 2021-06-22 VITALS
SYSTOLIC BLOOD PRESSURE: 138 MMHG | DIASTOLIC BLOOD PRESSURE: 86 MMHG | WEIGHT: 150 LBS | HEART RATE: 78 BPM | BODY MASS INDEX: 26.15 KG/M2 | OXYGEN SATURATION: 98 %

## 2021-06-22 DIAGNOSIS — R13.10 DYSPHAGIA, UNSPECIFIED TYPE: ICD-10-CM

## 2021-06-22 DIAGNOSIS — F41.9 ANXIETY: ICD-10-CM

## 2021-06-22 DIAGNOSIS — E05.00 GRAVES DISEASE: ICD-10-CM

## 2021-06-22 DIAGNOSIS — R13.10 DYSPHAGIA, UNSPECIFIED TYPE: Primary | ICD-10-CM

## 2021-06-22 PROCEDURE — 99214 OFFICE O/P EST MOD 30 MIN: CPT | Performed by: FAMILY MEDICINE

## 2021-06-22 RX ORDER — HYDROXYZINE HYDROCHLORIDE 25 MG/1
25 TABLET, FILM COATED ORAL 3 TIMES DAILY PRN
Qty: 90 TABLET | Refills: 0 | Status: SHIPPED | OUTPATIENT
Start: 2021-06-22 | End: 2022-02-09

## 2021-06-22 RX ORDER — OMEPRAZOLE 20 MG/1
20 TABLET, DELAYED RELEASE ORAL DAILY
Qty: 30 TABLET | Refills: 1 | Status: SHIPPED | OUTPATIENT
Start: 2021-06-22 | End: 2021-06-22

## 2021-06-22 NOTE — PROGRESS NOTES
Gia Akhtar is a 70 year old patient who presents to clinic for evaluation.  She saw Dr Lucas this morning regarding her thyroid and Graves.  Her dose was lowered.  She has been experiencing intermittent dysphagia with solids and liquids.  She is wondering about a gastroenterology referral.  She also has reflux type symptoms at times.  No fever, weight loss, night sweats, melena.  She also gets some anxiety at times about her swallowing and wonders if she can try a medication to see if it will help when she is feeling anxious.  Prefers something non-habit forming.    Review of Systems   Constitutional, HEENT, cardiovascular, pulmonary, GI, , musculoskeletal, neuro, skin, endocrine and psych systems are negative, except as otherwise noted.      Objective    /86   Pulse 78   Wt 68 kg (150 lb)   SpO2 98%   BMI 26.15 kg/m      General: Well appearing, NAD  Psych: normal mood and affect        Assessment & Plan     Dysphagia, unspecified type  Uncertain cause.  Intermittent.  Recommend trial of PPI and referral for further eval.  Thyroid ultrasound unremarkable today  - omeprazole (PRILOSEC OTC) 20 MG EC tablet; Take 1 tablet (20 mg) by mouth daily 30-60 minutes before breakfast  - GASTROENTEROLOGY ADULT REF CONSULT ONLY; Future    Graves disease  Cont endocrine follow up and thyroid med adjustments    Anxiety  - hydrOXYzine (ATARAX) 25 MG tablet; Take 1 tablet (25 mg) by mouth 3 times daily as needed for anxiety  Proper use and potential benefits, harms and side effects discussed in detail.      Gato Vincent MD  [facility]

## 2021-06-24 ENCOUNTER — TRANSFERRED RECORDS (OUTPATIENT)
Dept: FAMILY MEDICINE | Facility: CLINIC | Age: 71
End: 2021-06-24

## 2021-06-30 ENCOUNTER — OFFICE VISIT (OUTPATIENT)
Dept: FAMILY MEDICINE | Facility: CLINIC | Age: 71
End: 2021-06-30

## 2021-06-30 VITALS
DIASTOLIC BLOOD PRESSURE: 70 MMHG | BODY MASS INDEX: 25.31 KG/M2 | OXYGEN SATURATION: 98 % | SYSTOLIC BLOOD PRESSURE: 136 MMHG | HEART RATE: 60 BPM | HEIGHT: 64 IN | RESPIRATION RATE: 16 BRPM | WEIGHT: 148.25 LBS

## 2021-06-30 DIAGNOSIS — R31.29 MICROHEMATURIA: ICD-10-CM

## 2021-06-30 DIAGNOSIS — R42 LIGHTHEADEDNESS: ICD-10-CM

## 2021-06-30 DIAGNOSIS — R41.89 COGNITIVE CHANGE: ICD-10-CM

## 2021-06-30 DIAGNOSIS — K59.00 CONSTIPATION, UNSPECIFIED CONSTIPATION TYPE: ICD-10-CM

## 2021-06-30 DIAGNOSIS — R11.0 NAUSEA: Primary | ICD-10-CM

## 2021-06-30 DIAGNOSIS — R42 DIZZINESS: ICD-10-CM

## 2021-06-30 LAB
BACTERIA URINE: ABNORMAL
BILIRUB UR QL STRIP: ABNORMAL
BLOOD URINE DIP: ABNORMAL
CASTS/LPF: 0
COLOR UR: YELLOW
CRYSTAL URINE: 0
EPITHELIAL CELLS - QUEST: ABNORMAL
GLUCOSE UR STRIP-MCNC: ABNORMAL MG/DL
KETONES UR QL STRIP: ABNORMAL
LEUKOCYTE ESTERASE URINE DIP: ABNORMAL
MUCOUS URINE: ABNORMAL
NITRITE UR QL STRIP: ABNORMAL
PH UR STRIP: 5.5 PH (ref 5–9)
PROT UR QL: ABNORMAL MG/DL (ref ?–0.01)
RBC URINE: ABNORMAL (ref 0–3)
SP GR UR STRIP: 1.02 (ref 1–1.02)
UROBILINOGEN UR QL STRIP: 0.2 EU/DL (ref 0.2–1)
WBC URINE: ABNORMAL (ref 0–3)

## 2021-06-30 PROCEDURE — 81003 URINALYSIS AUTO W/O SCOPE: CPT | Performed by: FAMILY MEDICINE

## 2021-06-30 PROCEDURE — 99214 OFFICE O/P EST MOD 30 MIN: CPT | Performed by: FAMILY MEDICINE

## 2021-06-30 PROCEDURE — 74018 RADEX ABDOMEN 1 VIEW: CPT | Mod: FY | Performed by: FAMILY MEDICINE

## 2021-06-30 PROCEDURE — 93000 ELECTROCARDIOGRAM COMPLETE: CPT | Mod: 59 | Performed by: FAMILY MEDICINE

## 2021-06-30 RX ORDER — ONDANSETRON 4 MG/1
4 TABLET, ORALLY DISINTEGRATING ORAL EVERY 8 HOURS PRN
Qty: 30 TABLET | Refills: 0 | Status: SHIPPED | OUTPATIENT
Start: 2021-06-30 | End: 2022-02-09

## 2021-06-30 ASSESSMENT — MIFFLIN-ST. JEOR: SCORE: 1174.52

## 2021-06-30 NOTE — LETTER
[facility] [address] [place]  [address]  Phone: [phone]    July 2, 2021      Giovana Key  [address]  [facility] [place] [address]              Dear Giovana     Your abdominal xray was normal.     It was very nice seeing you.  If you have any questions, please contact our office.         Sincerely,     Gato Vincent MD       Results for orders placed or performed in visit on 06/30/21   Urinalysis w/reflex protein, bili ([facility])     Status: Abnormal   Result Value Ref Range    Color Urine Yellow     pH Urine 5.5 5 - 9 pH    Specific Gravity Urine 1.025 1.005 - 1.025    Protein Urine neg 0.01 mg/dL    Glucose Urine neg     Ketones Urine 2+ (A)     Leukocyte Esterase Urine neg     Blood Urine trace (A)     Nitrite Urine Neg NEG    Bilirubin Urine Dip neg     Urobilinogen Urine 0.2 0.2 - 1.0 EU/dL    WBC Urine 0-2 0 - 3    RBC Urine 0-2 0 - 3    Epithelial Cells few     Crystal Urine 0     Bacteria Urine few (A)     Mucous Urine mod     Casts/LPF 0

## 2021-06-30 NOTE — LETTER
[facility] [address] [place]  [address]  Phone: [phone]    July 1, 2021      Giovana Key  [address]  [facility] [address]              Dear Giovana,    Your urine shows no blood which is good.  It does appear you are dehydrated which may be contributing to your symptoms.  Please increase water intake.       Sincerely,     Gato Vincent MD /ja    Results for orders placed or performed in visit on 06/30/21   Urinalysis w/reflex protein, bili ([facility])     Status: Abnormal   Result Value Ref Range    Color Urine Yellow     pH Urine 5.5 5 - 9 pH    Specific Gravity Urine 1.025 1.005 - 1.025    Protein Urine neg 0.01 mg/dL    Glucose Urine neg     Ketones Urine 2+ (A)     Leukocyte Esterase Urine neg     Blood Urine trace (A)     Nitrite Urine Neg NEG    Bilirubin Urine Dip neg     Urobilinogen Urine 0.2 0.2 - 1.0 EU/dL    WBC Urine 0-2 0 - 3    RBC Urine 0-2 0 - 3    Epithelial Cells few     Crystal Urine 0     Bacteria Urine few (A)     Mucous Urine mod     Casts/LPF 0

## 2021-06-30 NOTE — PROGRESS NOTES
"  Gia Akhtar is a 70 year old patient who presents to clinic for evaluation.  She was recently seen by GI and endoscopy and probably dilation is planned.  She reports about 2-3 weeks of intermittent dizziness/lightheadedness and nausea.  No fever, chills, cough, chest pain, dyspnea.  She reports occasional constipation.  She also feels a bit more forgetful lately.  Denies other focal neurologic deficits.  No dysarthria, dysphagia vision changes or unilateral weakness.      Review of Systems   Constitutional, HEENT, cardiovascular, pulmonary, GI, , musculoskeletal, neuro, skin, endocrine and psych systems are negative, except as otherwise noted.      Objective    /70   Pulse 60   Resp 16   Ht 1.613 m (5' 3.5\")   Wt 67.2 kg (148 lb 4 oz)   SpO2 98%   BMI 25.85 kg/m      General: Well appearing, NAD  HEENT: TMs clear  Heart: RRR, no murmur  Chest: Lungs CTAB  Abdomen: soft without significant focal tenderness.  No mass  Neuro: CN 2-12 intact.  No focal deficits.  Skin: Clear  Psych: normal mood and affect        EKG - Reviewed and interpreted by me unchanged from previous tracings    Assessment & Plan     Nausea  Uncertain cause of multiple nonspecific symptoms.  EKG reassuring.  Positive HR orthostatics and urine appears dehydrated with ketones.  Encouraged increased fluids.  GI endoscopy as planned.  Trial of zofran.  - EKG 12-lead complete w/read - Clinics  - ondansetron (ZOFRAN-ODT) 4 MG ODT tab; Take 1 tablet (4 mg) by mouth every 8 hours as needed for nausea    Lightheadedness  See above.  ? Orthostatic.  Close monitoring.  Labs have been reassuring overall.  - EKG 12-lead complete w/read - Clinics    Constipation, unspecified constipation type  Xray benign appearing.   - X-ray Abdomen AP view    Microhematuria  Clear today  - Urinalysis w/reflex protein, bili ([facility])    Dizziness  Request further eval given cognitive concerns and persistent dizziness  - NEUROLOGY ADULT REFERRAL    Cognitive " change  - NEUROLOGY ADULT REFERRAL  [id number]}     See Patient Instructions    No follow-ups on file.    Gato Vincent MD  [facility]

## 2021-07-02 ENCOUNTER — TRANSFERRED RECORDS (OUTPATIENT)
Dept: FAMILY MEDICINE | Facility: CLINIC | Age: 71
End: 2021-07-02

## 2021-07-09 ENCOUNTER — TELEPHONE (OUTPATIENT)
Dept: FAMILY MEDICINE | Facility: CLINIC | Age: 71
End: 2021-07-09

## 2021-07-09 DIAGNOSIS — R13.10 DYSPHAGIA, UNSPECIFIED TYPE: ICD-10-CM

## 2021-07-09 DIAGNOSIS — R10.84 ABDOMINAL PAIN, GENERALIZED: ICD-10-CM

## 2021-07-09 DIAGNOSIS — R11.0 NAUSEA: Primary | ICD-10-CM

## 2021-07-09 RX ORDER — SUCRALFATE 1 G/1
1 TABLET ORAL 4 TIMES DAILY
Qty: 56 TABLET | Refills: 0 | Status: SHIPPED | OUTPATIENT
Start: 2021-07-09 | End: 2021-07-23

## 2021-07-09 NOTE — TELEPHONE ENCOUNTER
Patient was is in ED @ [facility] 7/2/21 for abd pain, fatigue and difficulty eating. Patient was prescribed Carafate while at the ED and is now requesting refill. She reports that she is scheduled for EGD through [facility] on 7/13/21. Refill entered and routed to PCP Dr Vincent for review. Tammie Harvey

## 2021-07-13 ENCOUNTER — OFFICE VISIT (OUTPATIENT)
Dept: FAMILY MEDICINE | Facility: CLINIC | Age: 71
End: 2021-07-13

## 2021-07-13 VITALS
RESPIRATION RATE: 16 BRPM | DIASTOLIC BLOOD PRESSURE: 64 MMHG | BODY MASS INDEX: 25.28 KG/M2 | OXYGEN SATURATION: 97 % | WEIGHT: 145 LBS | HEART RATE: 54 BPM | SYSTOLIC BLOOD PRESSURE: 134 MMHG | TEMPERATURE: 98.5 F

## 2021-07-13 DIAGNOSIS — R00.1 SINUS BRADYCARDIA: ICD-10-CM

## 2021-07-13 DIAGNOSIS — Z01.818 PREOP GENERAL PHYSICAL EXAM: ICD-10-CM

## 2021-07-13 DIAGNOSIS — R13.13 PHARYNGEAL DYSPHAGIA: Primary | ICD-10-CM

## 2021-07-13 DIAGNOSIS — E05.00 GRAVES DISEASE: ICD-10-CM

## 2021-07-13 PROCEDURE — 99214 OFFICE O/P EST MOD 30 MIN: CPT | Performed by: FAMILY MEDICINE

## 2021-07-13 NOTE — PATIENT INSTRUCTIONS

## 2021-07-13 NOTE — PROGRESS NOTES
[facility] [address] [place] [address]  Phone: [phone]  Fax: [phone]  Primary Provider: Gato Vincent  Pre-op Performing Provider: GATO VINCENT      PREOPERATIVE EVALUATION:  Today's date: 7/13/2021    Giovana Key is a 70 year old female who presents for a preoperative evaluation.    Surgical Information:  Surgery/Procedure: endoscopy with dilation  Surgery Location: [facility]  Surgeon: TBD  Surgery Date: 7/15/21  Time of Surgery: 630am  Where patient plans to recover: At home with family  Fax number for surgical facility: [phone]      Type of Anesthesia Anticipated: General  Preoperative Questionnaire:   No - Have you ever had a heart attack or stroke?  No - Have you ever had surgery on your heart or blood vessels, such as a stent, coronary (heart) bypass, or surgery on an artery in the head, neck, heart, or legs?  No - Do you have chest pain when you are physically active?  No - Do you have a history of heart failure?  No - Do you currently have a cold, bronchitis, or symptoms of other respiratory (head and chest) infections?  No - Do you have a cough, shortness of breath, or wheezing?  No - Do you or anyone in your family have a history of blood clots?  No - Do you or anyone in your family have a serious bleeding problem, such as long-lasting bleeding after surgeries or cuts?  No - Have you ever had anemia or been told to take iron pills?  No - Have you had any abnormal blood loss such as black, tarry or bloody stools, or abnormal vaginal bleeding?  No - Have you ever had a blood transfusion?  Yes - Are you willing to have a blood transfusion if it is medically needed before, during, or after your surgery?  YES - HAVE YOU OR ANYONE IN YOUR FAMILY EVER HAD PROBLEMS WITH ANESTHESIA (SEDATION FOR SURGERY)? Nausea - pt  No - Do you have sleep apnea, excessive snoring, or daytime drowsiness?   No - Do you have any artifical heart valves or other  implanted medical devices, such as a pacemaker, defibrillator, or continuous glucose monitor?  NO - Do you have any artifical joints?  YES - Are you allergic to latex?  No - Is there any chance that you may be pregnant?        Assessment & Plan     The proposed surgical procedure is considered LOW risk.    Preop general physical exam  Planned procedure    Pharyngeal dysphagia  Planned procedure    Graves disease  Stable on replacement.  Follows with endocrinology    Sinus bradycardia  No definite symptoms.  Had cardiac work up in past at [facility].  Endoscopy delayed due to bradycardia this morning.  Recommend follow up at [facility] but not needed prior to endoscopy as this is chronic and without symptoms currently.      Risks and Recommendations:  The patient has the following additional risks and recommendations for perioperative complications:   - No identified additional risk factors other than previously addressed    Medication Instructions:  Patient is to take all scheduled medications on the day of surgery    RECOMMENDATION:  APPROVAL GIVEN to proceed with proposed procedure, without further diagnostic evaluation.    [id number]}    Subjective     HPI related to upcoming procedure: Here for preoperative risk assessment.  Was scheduled for endoscopy this morning to evaluate dysphagia but was bradycardic and delayed to be done in hospital.  She has had intermittent lightheadedness but none currently.  Feels well.  Had cardiac evaluation a few years ago at [facility].  No on any rate slowing medications.  Is active without symptoms.      No flowsheet data found.    Health Care Directive:  Patient does not have a Health Care Directive or Living Will: Discussed advance care planning with patient; information given to patient to review.    Preoperative Review of :   reviewed - no record of controlled substances prescribed.      Status of Chronic Conditions:  See problem list for active medical problems.  Problems all longstanding and  stable, except as noted/documented.  See ROS for pertinent symptoms related to these conditions.      Review of Systems  CONSTITUTIONAL: NEGATIVE for fever, chills, change in weight  INTEGUMENTARY/SKIN: NEGATIVE for worrisome rashes, moles or lesions  EYES: NEGATIVE for vision changes or irritation  ENT/MOUTH: NEGATIVE for ear, mouth and throat problems  RESP: NEGATIVE for significant cough or SOB  BREAST: NEGATIVE for masses, tenderness or discharge  CV: NEGATIVE for chest pain, palpitations or peripheral edema  GI: NEGATIVE for nausea, abdominal pain, heartburn, or change in bowel habits  : NEGATIVE for frequency, dysuria, or hematuria  MUSCULOSKELETAL: NEGATIVE for significant arthralgias or myalgia  NEURO: NEGATIVE for weakness, dizziness or paresthesias  ENDOCRINE: NEGATIVE for temperature intolerance, skin/hair changes  HEME: NEGATIVE for bleeding problems  PSYCHIATRIC: NEGATIVE for changes in mood or affect    Patient Active Problem List    Diagnosis Date Noted     Migraine with aura and without status migrainosus, not intractable 04/04/2018     Priority: Medium     Chronic congestion of paranasal sinus 04/04/2018     Priority: Medium     Pharyngeal dysphagia 02/27/2017     Priority: Medium     Graves disease 03/14/2016     Priority: Medium     Diverticulitis of colon 02/29/2016     Priority: Medium     Advanced directives, counseling/discussion 07/31/2015     Priority: Medium     Advanced Care Planning 7/31/2015: Patient states they have received and completed their Advanced Care directive and they will bring in a copy for us to scan into their chart.  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     Thyroid disease      WPW syndrome      Past Surgical History:   Procedure Laterality Date     CHOLECYSTECTOMY       COSMETIC SURGERY      breast augmentation     ENDOSCOPIC RETROGRADE CHOLANGIOPANCREATOGRAM N/A 6/2/2017    Procedure: COMBINED ENDOSCOPIC RETROGRADE CHOLANGIOPANCREATOGRAPHY, SPHINCTEROTOMY;  ENDOSCOPIC RETROGRADE CHOLANGIOPANCREATOGRAM (ERCP) STONE REMOVAL, SPINCHTEROTOMY;  Surgeon: Preston Barnett MD;  Location:  OR     ENT SURGERY  age 26    tonsillectomy     ESOPHAGOSCOPY, GASTROSCOPY, DUODENOSCOPY (EGD), COMBINED N/A 5/23/2017    Procedure: COMBINED ENDOSCOPIC ULTRASOUND, ESOPHAGOSCOPY, GASTROSCOPY, DUODENOSCOPY (EGD);  ENDOSCOPIC ULTRASOUND, ESOPHAGOSCOPY, GASTROSCOPY, DUODENOSCOPY (EGD) (MAC SEDATION) ;  Surgeon: Dot Vargas MD;  Location:  GI     ESOPHAGOSCOPY, GASTROSCOPY, DUODENOSCOPY (EGD), COMBINED N/A 5/23/2017    Procedure: COMBINED ESOPHAGOSCOPY, GASTROSCOPY, DUODENOSCOPY (EGD), BIOPSY SINGLE OR MULTIPLE;;  Surgeon: Dot Vargas MD;  Location:  GI     GYN SURGERY      tubal ligation x2     HYSTERECTOMY VAGINAL  1997    Dr Grier     Current Outpatient Medications   Medication Sig Dispense Refill     aspirin 81 MG EC tablet Take 81 mg by mouth daily       cholecalciferol (VITAMIN D3) 5000 units TABS tablet Take 5,000 Units by mouth daily       Docusate Sodium (COLACE PO) Take 50 mg by mouth as needed        eletriptan (RELPAX) 20 MG tablet Take 1-2 tablets (20-40 mg) by mouth at onset of headache for migraine May repeat in 2 hours. Max 4 tablets/24 hours. 18 tablet 1     EPINEPHrine (ANY BX GENERIC EQUIV) 0.3 MG/0.3ML injection 2-pack Inject 0.3 mLs (0.3 mg) into the muscle as needed for anaphylaxis 0.6 mL 0     Glycerin-Hypromellose- (DRY EYE  RELIEF DROPS OP)        hydrOXYzine (ATARAX) 25 MG tablet Take 1 tablet (25 mg) by mouth 3 times daily as needed for anxiety 90 tablet 0     Levothyroxine Sodium (SYNTHROID PO) Take 75 mcg by mouth daily        Liothyronine Sodium (CYTOMEL PO) Take 5 mcg by mouth daily Take 2 tablets daily       MOTRIN IB PO Take 200 mg by mouth as needed for moderate pain       omeprazole (PRILOSEC) 20 MG DR capsule TAKE 1 CAPSULE BY MOUTH DAILY FOR 30 TO 60 MINUTES BEFORE BREAKFAST 90 capsule 3     ondansetron (ZOFRAN-ODT) 4 MG ODT tab Take 1 tablet (4 mg) by mouth every 8 hours as needed for nausea 30 tablet 0     SALINE NASAL SPRAY NA OTC Salt Water Nasal Spray       sucralfate (CARAFATE) 1 GM tablet Take 1 tablet (1 g) by mouth 4 times daily for 14 days 56 tablet 0     TESTOSTERONE IM Inject 0.1 mLs into the muscle See Admin Instructions  q 2 weeks       UNABLE TO FIND 15 % daily MEDICATION NAME: progesterone cream (progest-hrt) 150mg/gm  Applies a 1/4 teaspoon daily       vitamin B-12 (CYANOCOBALAMIN) 100 MCG tablet Take 1,000 mcg by mouth daily       vitamin C-electrolytes (EMERGEN-C) 1000mg vitamin C super orange drink mix Take 1 packet by mouth Mix 1 packet in 4-6oz water and take once or twice daily or as directed.         Allergies   Allergen Reactions     Imitrex [Sumatriptan] Cramps     Codeine GI Disturbance     Doxycycline Nausea     Lactose Diarrhea     Orange Fruit [Citrus] Dermatitis     Cold sores     Strawberries [Strawberry]      Throat itching     Augmentin Hives and Itching     Metronidazole Hives and Itching        Social History     Tobacco Use     Smoking status: Former Smoker     Types: Cigarettes     Quit date: 1983     Years since quittin.5     Smokeless tobacco: Never Used   Substance Use Topics     Alcohol use: Yes     Alcohol/week: 0.0 standard drinks     Comment: 1 drink nightly     Family History   Problem Relation Age of Onset     Hypertension Other      Hypertension Mother 89     No Known  Problems Father      No Known Problems Sister      No Known Problems Brother      No Known Problems Maternal Grandmother      No Known Problems Maternal Grandfather      No Known Problems Paternal Grandmother      History   Drug Use No         Objective     There were no vitals taken for this visit.    Physical Exam    GENERAL APPEARANCE: healthy, alert and no distress     EYES: EOMI, PERRL     HENT: ear canals and TM's normal and nose and mouth without ulcers or lesions     NECK: no adenopathy, no asymmetry, masses, or scars and thyroid normal to palpation     RESP: lungs clear to auscultation - no rales, rhonchi or wheezes     CV: normal S1 S2, no S3 or S4, no murmur, click or rub and bradycardia     ABDOMEN:  soft, nontender, no HSM or masses and bowel sounds normal     MS: extremities normal- no gross deformities noted, no evidence of inflammation in joints, FROM in all extremities.     SKIN: no suspicious lesions or rashes     NEURO: Normal strength and tone, sensory exam grossly normal, mentation intact and speech normal     PSYCH: mentation appears normal. and affect normal/bright     LYMPHATICS: No cervical adenopathy    Recent Labs   Lab Test 06/18/21  1456 06/18/21  0000 05/29/20  1446 05/29/20  1445   HGB  --  13.9 14.1  --    PLT  --  209 201  --      --   --  144   POTASSIUM 4.0  --   --  4.0   CR 0.77  --   --  0.69        Diagnostics:  No labs were ordered during this visit.   No EKG this visit, completed in the last 90 days.    Revised Cardiac Risk Index (RCRI):  The patient has the following serious cardiovascular risks for perioperative complications:   - No serious cardiac risks = 0 points     RCRI Interpretation: 0 points: Class I (very low risk - 0.4% complication rate)           Signed Electronically by: Gato Vincent MD  Copy of this evaluation report is provided to requesting physician.

## 2021-07-20 DIAGNOSIS — R13.13 PHARYNGEAL DYSPHAGIA: ICD-10-CM

## 2021-07-20 DIAGNOSIS — R13.13 PHARYNGEAL DYSPHAGIA: Primary | ICD-10-CM

## 2021-07-20 RX ORDER — PANTOPRAZOLE SODIUM 40 MG/1
TABLET, DELAYED RELEASE ORAL
Qty: 60 TABLET | Refills: 3 | Status: SHIPPED | OUTPATIENT
Start: 2021-07-20 | End: 2021-07-20

## 2021-07-20 RX ORDER — PANTOPRAZOLE SODIUM 40 MG/1
TABLET, DELAYED RELEASE ORAL
Qty: 180 TABLET | Refills: 0 | Status: SHIPPED | OUTPATIENT
Start: 2021-07-20 | End: 2022-02-09

## 2021-07-23 NOTE — PROGRESS NOTES
7/14/21 faxed preop to [facility] @ [phone]    kB Rehman,   [facility]  [phone]

## 2021-08-03 ENCOUNTER — OFFICE VISIT (OUTPATIENT)
Dept: FAMILY MEDICINE | Facility: CLINIC | Age: 71
End: 2021-08-03

## 2021-08-03 VITALS
BODY MASS INDEX: 25.46 KG/M2 | HEART RATE: 63 BPM | OXYGEN SATURATION: 97 % | DIASTOLIC BLOOD PRESSURE: 68 MMHG | SYSTOLIC BLOOD PRESSURE: 116 MMHG | WEIGHT: 146 LBS

## 2021-08-03 DIAGNOSIS — B07.9 VIRAL WARTS, UNSPECIFIED TYPE: ICD-10-CM

## 2021-08-03 DIAGNOSIS — Z01.818 PREOP GENERAL PHYSICAL EXAM: ICD-10-CM

## 2021-08-03 DIAGNOSIS — M17.11 PRIMARY OSTEOARTHRITIS OF RIGHT KNEE: Primary | ICD-10-CM

## 2021-08-03 DIAGNOSIS — R00.1 SINUS BRADYCARDIA: ICD-10-CM

## 2021-08-03 DIAGNOSIS — E05.00 GRAVES DISEASE: ICD-10-CM

## 2021-08-03 LAB
% GRANULOCYTES: 65 % (ref 42.2–75.2)
HCT VFR BLD AUTO: 37.2 % (ref 35–46)
HEMOGLOBIN: 13.3 G/DL (ref 11.8–15.5)
LYMPHOCYTES NFR BLD AUTO: 28.1 % (ref 20.5–51.1)
MCH RBC QN AUTO: 32.7 PG (ref 27–31)
MCHC RBC AUTO-ENTMCNC: 35.7 G/DL (ref 33–37)
MCV RBC AUTO: 91.7 FL (ref 80–100)
MONOCYTES NFR BLD AUTO: 6.9 % (ref 1.7–9.3)
PLATELET # BLD AUTO: 196 K/UL (ref 140–450)
RBC # BLD AUTO: 4.06 X10/CMM (ref 3.7–5.2)
WBC # BLD AUTO: 6.6 X10/CMM (ref 3.8–11)

## 2021-08-03 PROCEDURE — 17110 DESTRUCTION B9 LES UP TO 14: CPT | Performed by: FAMILY MEDICINE

## 2021-08-03 PROCEDURE — 36415 COLL VENOUS BLD VENIPUNCTURE: CPT | Performed by: FAMILY MEDICINE

## 2021-08-03 PROCEDURE — 85025 COMPLETE CBC W/AUTO DIFF WBC: CPT | Performed by: FAMILY MEDICINE

## 2021-08-03 PROCEDURE — 99214 OFFICE O/P EST MOD 30 MIN: CPT | Mod: 25 | Performed by: FAMILY MEDICINE

## 2021-08-03 NOTE — PROGRESS NOTES
[facility] [address] [place] [address]  Phone: [phone]  Fax: [phone]  Primary Provider: Gato Vincent  Pre-op Performing Provider: GATO VINCENT      PREOPERATIVE EVALUATION:  Today's date: 8/3/2021    Giovana Key is a 70 year old female who presents for a preoperative evaluation.    Surgical Information:  Surgery/Procedure: Rt knee replacement  Surgery Location: [facility]   Surgeon: Dr Pelaez   Surgery Date: 8/25/2021  Time of Surgery: 930  Where patient plans to recover: At home with Home Care  Fax number for surgical facility: [phone]    Type of Anesthesia Anticipated: to be determined    Preoperative Questionnaire:   No - Have you ever had a heart attack or stroke?  No - Have you ever had surgery on your heart or blood vessels, such as a stent, coronary (heart) bypass, or surgery on an artery in the head, neck, heart, or legs?  No - Do you have chest pain when you are physically active?  No - Do you have a history of heart failure?  No - Do you currently have a cold, bronchitis, or symptoms of other respiratory (head and chest) infections?  No - Do you have a cough, shortness of breath, or wheezing?  No - Do you or anyone in your family have a history of blood clots?  No - Do you or anyone in your family have a serious bleeding problem, such as long-lasting bleeding after surgeries or cuts?  No - Have you ever had anemia or been told to take iron pills?  No - Have you had any abnormal blood loss such as black, tarry or bloody stools, or abnormal vaginal bleeding?  No - Have you ever had a blood transfusion?  Yes - Are you willing to have a blood transfusion if it is medically needed before, during, or after your surgery?  YES - Have you or anyone in your family ever had problems with anesthesia (sedation for surgery)? Herself and mother  No - Do you have sleep apnea, excessive snoring, or daytime drowsiness?   No - Do you have any  artifical heart valves or other implanted medical devices, such as a pacemaker, defibrillator, or continuous glucose monitor?  No - Do you have any artifical joints?  YES - Are you allergic to latex?  No - Is there any chance that you may be pregnant?        Assessment & Plan     The proposed surgical procedure is considered INTERMEDIATE risk.    Primary osteoarthritis of right knee  Planned TKA    Preop general physical exam    Graves disease  Stable, doing well on thyroid replacement.  TSH and T4 normal range in 6/2021.  Follows with Dr Lucas.    Sinus bradycardia  Has cardiology appointment scheduled    Wart  Cryo    PROCEDURE:    After the potential risks were discussed, verbal consent was obtained.  Approximately 15 seconds of freeze time was applied to the lesion using open spray technique with a 1 mm margin using 3 freeze-thaw cycles.  There were no immediate complications.  The patient tolerated the procedure well.  Frequent use of vaseline was recommended to promote good healing.  Signs of infection or complications were discussed and understood.        Risks and Recommendations:  The patient has the following additional risks and recommendations for perioperative complications:   - No identified additional risk factors other than previously addressed    Medication Instructions:  Patient is to take all scheduled medications on the day of surgery    RECOMMENDATION:  APPROVAL GIVEN to proceed with proposed procedure, without further diagnostic evaluation.          Subjective     HPI related to upcoming procedure: Giovana is here for preoperative risk assessment.  She is feeling well and overall in good health.      Graves: stable on thyroid replacement.  Follows with Dr Lucas.  Recent TSH and T4 in normal range.    Skin: has lesion posterior left leg that is bothering her      No flowsheet data found.    Health Care Directive:  Patient does not have a Health Care Directive or Living Will: Discussed advance  care planning with patient; information given to patient to review.    Preoperative Review of :   reviewed - no record of controlled substances prescribed.      Status of Chronic Conditions:  See problem list for active medical problems.  Problems all longstanding and stable, except as noted/documented.  See ROS for pertinent symptoms related to these conditions.      Review of Systems  CONSTITUTIONAL: NEGATIVE for fever, chills, change in weight  INTEGUMENTARY/SKIN: NEGATIVE for worrisome rashes, moles or lesions  EYES: NEGATIVE for vision changes or irritation  ENT/MOUTH: NEGATIVE for ear, mouth and throat problems  RESP: NEGATIVE for significant cough or SOB  CV: NEGATIVE for chest pain, palpitations or peripheral edema  GI: NEGATIVE for nausea, abdominal pain, heartburn, or change in bowel habits  : NEGATIVE for frequency, dysuria, or hematuria  MUSCULOSKELETAL: NEGATIVE for significant arthralgias or myalgia  NEURO: NEGATIVE for weakness, dizziness or paresthesias  ENDOCRINE: NEGATIVE for temperature intolerance, skin/hair changes  HEME: NEGATIVE for bleeding problems  PSYCHIATRIC: NEGATIVE for changes in mood or affect    Patient Active Problem List    Diagnosis Date Noted     Sinus bradycardia 07/13/2021     Priority: Medium     Migraine with aura and without status migrainosus, not intractable 04/04/2018     Priority: Medium     Chronic congestion of paranasal sinus 04/04/2018     Priority: Medium     Pharyngeal dysphagia 02/27/2017     Priority: Medium     Graves disease 03/14/2016     Priority: Medium     Advanced directives, counseling/discussion 07/31/2015     Priority: Medium     Advanced Care Planning 7/31/2015: Patient states they have received and completed their Advanced Care directive and they will bring in a copy for us to scan into their chart.  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, 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     Thyroid disease      WPW syndrome      Past Surgical History:   Procedure Laterality Date     CHOLECYSTECTOMY       COSMETIC SURGERY      breast augmentation     ENDOSCOPIC RETROGRADE CHOLANGIOPANCREATOGRAM N/A 6/2/2017    Procedure: COMBINED ENDOSCOPIC RETROGRADE CHOLANGIOPANCREATOGRAPHY, SPHINCTEROTOMY;  ENDOSCOPIC RETROGRADE CHOLANGIOPANCREATOGRAM (ERCP) STONE REMOVAL, SPINCHTEROTOMY;  Surgeon: Preston Barnett MD;  Location:  OR     ENT SURGERY  age 26    tonsillectomy     ESOPHAGOSCOPY, GASTROSCOPY, DUODENOSCOPY (EGD), COMBINED N/A 5/23/2017    Procedure: COMBINED ENDOSCOPIC ULTRASOUND, ESOPHAGOSCOPY, GASTROSCOPY, DUODENOSCOPY (EGD);  ENDOSCOPIC ULTRASOUND, ESOPHAGOSCOPY, GASTROSCOPY, DUODENOSCOPY (EGD) (MAC SEDATION) ;  Surgeon: Dot Vargas MD;  Location:  GI     ESOPHAGOSCOPY, GASTROSCOPY, DUODENOSCOPY (EGD), COMBINED N/A 5/23/2017    Procedure: COMBINED ESOPHAGOSCOPY, GASTROSCOPY, DUODENOSCOPY (EGD), BIOPSY SINGLE OR MULTIPLE;;  Surgeon: Dot Vargas MD;  Location:  GI     GYN SURGERY      tubal ligation x2     HYSTERECTOMY VAGINAL  1997    Dr Grier     Current Outpatient Medications   Medication Sig Dispense Refill     aspirin 81 MG EC tablet Take 81 mg by mouth daily       cholecalciferol (VITAMIN D3) 5000 units TABS tablet Take 5,000 Units by mouth daily       Docusate Sodium (COLACE PO) Take 50 mg by mouth as needed        eletriptan (RELPAX) 20 MG tablet Take 1-2 tablets (20-40 mg) by mouth at onset of headache  for migraine May repeat in 2 hours. Max 4 tablets/24 hours. 18 tablet 1     EPINEPHrine (ANY BX GENERIC EQUIV) 0.3 MG/0.3ML injection 2-pack Inject 0.3 mLs (0.3 mg) into the muscle as needed for anaphylaxis 0.6 mL 0     Glycerin-Hypromellose- (DRY EYE RELIEF DROPS OP)        hydrOXYzine (ATARAX) 25 MG tablet Take 1 tablet (25 mg) by mouth 3 times daily as needed for anxiety 90 tablet 0     Levothyroxine Sodium (SYNTHROID PO) Take 75 mcg by mouth daily        Liothyronine Sodium (CYTOMEL PO) Take 5 mcg by mouth daily Take 2 tablets daily       MOTRIN IB PO Take 200 mg by mouth as needed for moderate pain       omeprazole (PRILOSEC) 20 MG DR capsule TAKE 1 CAPSULE BY MOUTH DAILY FOR 30 TO 60 MINUTES BEFORE BREAKFAST 90 capsule 3     ondansetron (ZOFRAN-ODT) 4 MG ODT tab Take 1 tablet (4 mg) by mouth every 8 hours as needed for nausea 30 tablet 0     pantoprazole (PROTONIX) 40 MG EC tablet TAKE 1 TABLET BY MOUTH IN THE MORNING AND IN THE EVENING 180 tablet 0     SALINE NASAL SPRAY NA OTC Salt Water Nasal [place]       UNABLE TO FIND 15 % daily MEDICATION NAME: progesterone cream (progest-hrt) 150mg/gm  Applies a 1/4 teaspoon daily       vitamin B-12 (CYANOCOBALAMIN) 100 MCG tablet Take 1,000 mcg by mouth daily       vitamin C-electrolytes (EMERGEN-C) 1000mg vitamin C super orange drink mix Take 1 packet by mouth Mix 1 packet in 4-6oz water and take once or twice daily or as directed.         Allergies   Allergen Reactions     Imitrex [Sumatriptan] Cramps     Codeine GI Disturbance     Doxycycline Nausea     Lactose Diarrhea     Orange Fruit [Citrus] Dermatitis     Cold sores     Strawberries [Strawberry]      Throat itching     Augmentin Hives and Itching     Latex Rash     Metronidazole Hives and Itching        Social History     Tobacco Use     Smoking status: Former Smoker     Types: Cigarettes     Quit date: 1983     Years since quittin.6     Smokeless tobacco: Never Used   Substance Use Topics      Alcohol use: Yes     Alcohol/week: 0.0 standard drinks     Comment: 1 drink nightly     Family History   Problem Relation Age of Onset     Hypertension Other      Hypertension Mother 89     No Known Problems Father      No Known Problems Sister      No Known Problems Brother      No Known Problems Maternal Grandmother      No Known Problems Maternal Grandfather      No Known Problems Paternal Grandmother      History   Drug Use No         Objective     /68   Pulse 63   Wt 66.2 kg (146 lb)   SpO2 97%   BMI 25.46 kg/m      Physical Exam    GENERAL APPEARANCE: healthy, alert and no distress     EYES: EOMI, PERRL     HENT: ear canals and TM's normal and nose and mouth without ulcers or lesions     NECK: no adenopathy, no asymmetry, masses, or scars and thyroid normal to palpation     RESP: lungs clear to auscultation - no rales, rhonchi or wheezes     CV: regular rates and rhythm, normal S1 S2, no S3 or S4 and no murmur, click or rub     ABDOMEN:  soft, nontender, no HSM or masses and bowel sounds normal     MS: extremities normal- no gross deformities noted, no evidence of inflammation in joints, FROM in all extremities.     SKIN: flesh colored verrucous lesion left calf     NEURO: Normal strength and tone, sensory exam grossly normal, mentation intact and speech normal     PSYCH: mentation appears normal. and affect normal/bright     LYMPHATICS: No cervical adenopathy    Recent Labs   Lab Test 06/18/21  1456 06/18/21  0000 05/29/20  1446 05/29/20  1445   HGB  --  13.9 14.1  --    PLT  --  209 201  --      --   --  144   POTASSIUM 4.0  --   --  4.0   CR 0.77  --   --  0.69        Diagnostics:  Labs pending at this time.  Results will be reviewed when available.  A1c and CBC requested, no history of DM2.  No EKG this visit, completed in the last 90 days.    Revised Cardiac Risk Index (RCRI):  The patient has the following serious cardiovascular risks for perioperative complications:   - No serious  cardiac risks = 0 points     RCRI Interpretation: 0 points: Class I (very low risk - 0.4% complication rate)           Signed Electronically by: Gato Vincent MD  Copy of this evaluation report is provided to requesting physician.

## 2021-08-03 NOTE — PATIENT INSTRUCTIONS

## 2021-08-03 NOTE — LETTER
[facility]  [address] [place]  [address]  Phone: [phone]    August 4, 2021      Giovana SCHMIDT Key  [address]  [facility] [address]              Dear Giovana,     Your blood counts are normal.     Your A1c, which is a reflection of glucose control in your body over approximately the past 3 months, is slightly elevated.  This is not diagnostic of diabetes but does put you at higher risk of developing it in the future.  It is important to make the necessary changes to diet and exercise to reduce your weight if needed.  You should follow up in 3-4 months to recheck this after implementing the recommended changes.       Sincerely,     Gato Vincent MD/alissa    Results for orders placed or performed in visit on 08/03/21   CBC with Diff/Plt ([facility])     Status: Abnormal   Result Value Ref Range    WBC x10/cmm 6.6 3.8 - 11.0 x10/cmm    % Lymphocytes 28.1 20.5 - 51.1 %    % Monocytes 6.9 1.7 - 9.3 %    % Granulocytes 65.0 42.2 - 75.2 %    RBC x10/cmm 4.06 3.7 - 5.2 x10/cmm    Hemoglobin 13.3 11.8 - 15.5 g/dl    Hematocrit 37.2 35 - 46 %    MCV 91.7 80 - 100 fL    MCH 32.7 (A) 27.0 - 31.0 pg    MCHC 35.7 33.0 - 37.0 g/dL    Platelet Count 196 140 - 450 K/uL   Hemoglobin A1C ([facility])     Status: Abnormal   Result Value Ref Range    Hemoglobin A1C 5.7 (H) 4.8 - 5.6 %    Narrative    Performed at:  01 - [facility] [address], [place], [place]  [id number]  : Chuckie Vela MD, Phone:  [phone]

## 2021-08-04 LAB — HBA1C MFR BLD: 5.7 % (ref 4.8–5.6)

## 2021-08-05 NOTE — PROGRESS NOTES
8/4/21 faxed preop and EKG to [facility] @ [phone]    Bk Rehman,   [facility]  [phone]

## 2021-08-28 ENCOUNTER — HOSPITAL REPORTS SCAN (OUTPATIENT)
Dept: FAMILY MEDICINE | Facility: CLINIC | Age: 71
End: 2021-08-28

## 2021-09-03 ENCOUNTER — MEDICAL CORRESPONDENCE (OUTPATIENT)
Dept: FAMILY MEDICINE | Facility: CLINIC | Age: 71
End: 2021-09-03

## 2021-09-03 RX ORDER — SCOLOPAMINE TRANSDERMAL SYSTEM 1 MG/1
PATCH, EXTENDED RELEASE TRANSDERMAL
COMMUNITY
Start: 2021-09-03 | End: 2022-02-09

## 2021-09-03 RX ORDER — IBUPROFEN 200 MG
200 TABLET ORAL EVERY 6 HOURS PRN
COMMUNITY
Start: 2021-09-03 | End: 2022-02-09

## 2021-09-03 RX ORDER — IBUPROFEN 200 MG
200 TABLET ORAL
COMMUNITY
End: 2021-09-03 | Stop reason: DRUGHIGH

## 2021-09-03 RX ORDER — ECHINACEA PURPUREA EXTRACT 125 MG
1 TABLET ORAL
COMMUNITY
End: 2022-02-09

## 2021-09-10 ENCOUNTER — MEDICAL CORRESPONDENCE (OUTPATIENT)
Dept: FAMILY MEDICINE | Facility: CLINIC | Age: 71
End: 2021-09-10

## 2021-09-17 ENCOUNTER — TELEPHONE (OUTPATIENT)
Dept: FAMILY MEDICINE | Facility: CLINIC | Age: 71
End: 2021-09-17

## 2021-09-22 ENCOUNTER — MEDICAL CORRESPONDENCE (OUTPATIENT)
Dept: FAMILY MEDICINE | Facility: CLINIC | Age: 71
End: 2021-09-22

## 2021-09-23 ENCOUNTER — TELEPHONE (OUTPATIENT)
Dept: FAMILY MEDICINE | Facility: CLINIC | Age: 71
End: 2021-09-23

## 2021-09-29 ENCOUNTER — TRANSFERRED RECORDS (OUTPATIENT)
Dept: FAMILY MEDICINE | Facility: CLINIC | Age: 71
End: 2021-09-29

## 2021-09-29 ENCOUNTER — MEDICAL CORRESPONDENCE (OUTPATIENT)
Dept: FAMILY MEDICINE | Facility: CLINIC | Age: 71
End: 2021-09-29

## 2021-10-04 ENCOUNTER — MEDICAL CORRESPONDENCE (OUTPATIENT)
Dept: FAMILY MEDICINE | Facility: CLINIC | Age: 71
End: 2021-10-04

## 2021-10-07 ENCOUNTER — MEDICAL CORRESPONDENCE (OUTPATIENT)
Dept: FAMILY MEDICINE | Facility: CLINIC | Age: 71
End: 2021-10-07

## 2021-10-11 ENCOUNTER — MEDICAL CORRESPONDENCE (OUTPATIENT)
Dept: FAMILY MEDICINE | Facility: CLINIC | Age: 71
End: 2021-10-11

## 2021-10-12 ENCOUNTER — MEDICAL CORRESPONDENCE (OUTPATIENT)
Dept: FAMILY MEDICINE | Facility: CLINIC | Age: 71
End: 2021-10-12

## 2021-10-29 DIAGNOSIS — Z23 NEEDS FLU SHOT: Primary | ICD-10-CM

## 2021-10-29 PROCEDURE — 90662 IIV NO PRSV INCREASED AG IM: CPT | Performed by: FAMILY MEDICINE

## 2021-10-29 PROCEDURE — G0008 ADMIN INFLUENZA VIRUS VAC: HCPCS | Performed by: FAMILY MEDICINE

## 2021-12-13 ENCOUNTER — TRANSFERRED RECORDS (OUTPATIENT)
Dept: FAMILY MEDICINE | Facility: CLINIC | Age: 71
End: 2021-12-13

## 2022-01-21 ENCOUNTER — TRANSFERRED RECORDS (OUTPATIENT)
Dept: FAMILY MEDICINE | Facility: CLINIC | Age: 72
End: 2022-01-21

## 2022-02-02 DIAGNOSIS — T78.40XD ALLERGIC REACTION, SUBSEQUENT ENCOUNTER: ICD-10-CM

## 2022-02-03 RX ORDER — EPINEPHRINE 0.3 MG/.3ML
INJECTION SUBCUTANEOUS
Qty: 2 EACH | Refills: 1 | Status: SHIPPED | OUTPATIENT
Start: 2022-02-03 | End: 2022-02-09

## 2022-02-09 ENCOUNTER — OFFICE VISIT (OUTPATIENT)
Dept: OBGYN | Facility: CLINIC | Age: 72
End: 2022-02-09
Payer: COMMERCIAL

## 2022-02-09 ENCOUNTER — ANCILLARY PROCEDURE (OUTPATIENT)
Dept: MAMMOGRAPHY | Facility: CLINIC | Age: 72
End: 2022-02-09
Attending: OBSTETRICS & GYNECOLOGY
Payer: COMMERCIAL

## 2022-02-09 VITALS
BODY MASS INDEX: 25.11 KG/M2 | DIASTOLIC BLOOD PRESSURE: 64 MMHG | WEIGHT: 147.1 LBS | HEIGHT: 64 IN | SYSTOLIC BLOOD PRESSURE: 124 MMHG

## 2022-02-09 DIAGNOSIS — Z01.419 ENCOUNTER FOR GYNECOLOGICAL EXAMINATION WITHOUT ABNORMAL FINDING: Primary | ICD-10-CM

## 2022-02-09 DIAGNOSIS — Z12.31 VISIT FOR SCREENING MAMMOGRAM: ICD-10-CM

## 2022-02-09 DIAGNOSIS — N32.81 OAB (OVERACTIVE BLADDER): ICD-10-CM

## 2022-02-09 DIAGNOSIS — Z12.31 BREAST CANCER SCREENING BY MAMMOGRAM: ICD-10-CM

## 2022-02-09 DIAGNOSIS — R39.15 URINARY URGENCY: ICD-10-CM

## 2022-02-09 PROBLEM — K31.9 DISORDER OF FUNCTION OF STOMACH: Status: ACTIVE | Noted: 2017-04-05

## 2022-02-09 PROBLEM — M17.11 OSTEOARTHRITIS OF RIGHT KNEE: Status: ACTIVE | Noted: 2021-08-25

## 2022-02-09 PROBLEM — K80.50 COMMON BILE DUCT CALCULUS: Status: ACTIVE | Noted: 2017-05-26

## 2022-02-09 PROBLEM — K22.2 OBSTRUCTION OF ESOPHAGUS: Status: ACTIVE | Noted: 2017-04-05

## 2022-02-09 PROBLEM — R13.10 DYSPHAGIA: Status: ACTIVE | Noted: 2022-02-09

## 2022-02-09 PROBLEM — K31.89 ACUTE GASTRIC VOLVULUS: Status: ACTIVE | Noted: 2017-04-05

## 2022-02-09 PROBLEM — K31.7 POLYP OF DUODENUM: Status: ACTIVE | Noted: 2017-04-07

## 2022-02-09 PROBLEM — K64.9 HEMORRHOIDS: Status: ACTIVE | Noted: 2017-01-17

## 2022-02-09 PROBLEM — K57.30 DIVERTICULAR DISEASE OF LARGE INTESTINE: Status: ACTIVE | Noted: 2017-01-17

## 2022-02-09 PROBLEM — G43.909 MIGRAINE: Status: ACTIVE | Noted: 2018-04-04

## 2022-02-09 PROBLEM — E03.8 OTHER SPECIFIED HYPOTHYROIDISM: Status: ACTIVE | Noted: 2021-08-25

## 2022-02-09 PROBLEM — R49.0 HOARSE: Status: ACTIVE | Noted: 2021-06-24

## 2022-02-09 LAB
ALBUMIN UR-MCNC: NEGATIVE MG/DL
APPEARANCE UR: CLEAR
BILIRUB UR QL STRIP: NEGATIVE
COLOR UR AUTO: YELLOW
GLUCOSE UR STRIP-MCNC: NEGATIVE MG/DL
HGB UR QL STRIP: ABNORMAL
KETONES UR STRIP-MCNC: NEGATIVE MG/DL
LEUKOCYTE ESTERASE UR QL STRIP: NEGATIVE
NITRATE UR QL: NEGATIVE
PH UR STRIP: 7 [PH] (ref 5–7)
SP GR UR STRIP: 1.02 (ref 1–1.03)
UROBILINOGEN UR STRIP-ACNC: 0.2 E.U./DL

## 2022-02-09 PROCEDURE — 99213 OFFICE O/P EST LOW 20 MIN: CPT | Mod: 25 | Performed by: NURSE PRACTITIONER

## 2022-02-09 PROCEDURE — 81003 URINALYSIS AUTO W/O SCOPE: CPT | Performed by: NURSE PRACTITIONER

## 2022-02-09 PROCEDURE — 87086 URINE CULTURE/COLONY COUNT: CPT | Performed by: NURSE PRACTITIONER

## 2022-02-09 PROCEDURE — 77067 SCR MAMMO BI INCL CAD: CPT | Mod: TC | Performed by: RADIOLOGY

## 2022-02-09 PROCEDURE — 77063 BREAST TOMOSYNTHESIS BI: CPT | Mod: TC | Performed by: RADIOLOGY

## 2022-02-09 PROCEDURE — 99397 PER PM REEVAL EST PAT 65+ YR: CPT | Performed by: NURSE PRACTITIONER

## 2022-02-09 RX ORDER — HYDROXYZINE HYDROCHLORIDE 25 MG/1
1 TABLET, FILM COATED ORAL
COMMUNITY
Start: 2021-09-08 | End: 2022-02-09

## 2022-02-09 RX ORDER — PROCHLORPERAZINE MALEATE 5 MG
TABLET ORAL
COMMUNITY
Start: 2021-08-28 | End: 2022-02-09

## 2022-02-09 RX ORDER — NAPROXEN 500 MG/1
1 TABLET ORAL
COMMUNITY
Start: 2021-09-08 | End: 2022-02-09

## 2022-02-09 RX ORDER — SCOLOPAMINE TRANSDERMAL SYSTEM 1 MG/1
1 PATCH, EXTENDED RELEASE TRANSDERMAL
COMMUNITY
Start: 2021-08-30 | End: 2022-02-09

## 2022-02-09 RX ORDER — IBUPROFEN 200 MG
200 TABLET ORAL
COMMUNITY
End: 2022-02-09

## 2022-02-09 RX ORDER — LIOTHYRONINE SODIUM 5 UG/1
10 TABLET ORAL DAILY
COMMUNITY
Start: 2021-11-30

## 2022-02-09 RX ORDER — ELETRIPTAN HYDROBROMIDE 20 MG/1
20 TABLET, FILM COATED ORAL
COMMUNITY
Start: 2021-05-13 | End: 2022-07-08

## 2022-02-09 RX ORDER — FLUTICASONE PROPIONATE 50 MCG
SPRAY, SUSPENSION (ML) NASAL
COMMUNITY
Start: 2021-12-12 | End: 2023-05-17

## 2022-02-09 RX ORDER — TESTOSTERONE CYPIONATE 200 MG/ML
0.25 INJECTION, SOLUTION INTRAMUSCULAR
COMMUNITY
Start: 2021-06-24 | End: 2022-02-09

## 2022-02-09 RX ORDER — CALCIUM CARBONATE 160(400)MG
TABLET,CHEWABLE ORAL
COMMUNITY
Start: 2021-08-28 | End: 2022-09-13

## 2022-02-09 RX ORDER — FAMOTIDINE 20 MG/1
20 TABLET, FILM COATED ORAL 2 TIMES DAILY
COMMUNITY
Start: 2021-12-14 | End: 2024-01-30

## 2022-02-09 RX ORDER — PANTOPRAZOLE SODIUM 40 MG/1
1 TABLET, DELAYED RELEASE ORAL EVERY 12 HOURS
COMMUNITY
Start: 2021-07-13 | End: 2022-02-09

## 2022-02-09 RX ORDER — LEVOTHYROXINE SODIUM 75 UG/1
1 TABLET ORAL DAILY
COMMUNITY
Start: 2021-06-24

## 2022-02-09 RX ORDER — TRAMADOL HYDROCHLORIDE 50 MG/1
50 TABLET ORAL
COMMUNITY
Start: 2021-08-28 | End: 2022-02-09

## 2022-02-09 RX ORDER — HYDROXYZINE PAMOATE 25 MG/1
25 CAPSULE ORAL
COMMUNITY
End: 2022-02-09

## 2022-02-09 RX ORDER — TOLTERODINE TARTRATE 2 MG/1
2 TABLET, EXTENDED RELEASE ORAL 2 TIMES DAILY
Qty: 180 TABLET | Refills: 1 | Status: SHIPPED | OUTPATIENT
Start: 2022-02-09 | End: 2023-03-14

## 2022-02-09 RX ORDER — AMOXICILLIN 250 MG
1-4 CAPSULE ORAL
COMMUNITY
Start: 2021-08-28 | End: 2022-02-09

## 2022-02-09 ASSESSMENT — MIFFLIN-ST. JEOR: SCORE: 1164.3

## 2022-02-09 NOTE — PROGRESS NOTES
Giovana is a 71 year old  female who presents for annual exam.     Breast and pelvic exam. Mammo today.     HPI:  The patient's PCP is Gato Vincent MD. patient here today for her annual GYN exam and mammogram.  She needs no further Pap testing.  She status post a vaginal hysterectomy in .  She is not currently on any hormone replacement therapy but had been on testosterone injections and progesterone cream in the past.  She sees someone outside of our clinic for this.  She is planning hormone labs tomorrow.    She has some urinary urgency with dribbling. Does not wear a pad. Denies any urge incontinence.     GYNECOLOGIC HISTORY:  No LMP recorded. Patient has had a hysterectomy..   reports that she quit smoking about 39 years ago. Her smoking use included cigarettes. She has never used smokeless tobacco.  Patient is sexually active.  STD testing offered?  Declined     Last PHQ-9 score on record=   PHQ-9 SCORE 2019   PHQ-9 Total Score 0     Last GAD7 score on record=   ROSALINO-7 SCORE 2017   Total Score 0 0 0     Alcohol Score =     HEALTH MAINTENANCE:    Cholesterol:   Cholesterol   Date Value Ref Range Status   2018 183 100 - 199 mg/dL Final   2017 179 <200 mg/dL Final      Last Mammo: One year ago, Result: Normal, Next Mammo: Today   Pap: (No results found for: PAP )  DEXA: 18   Colonoscopy: 17, Result:  Normal  Health maintenance updated:  yes    HISTORY:  OB History    Para Term  AB Living   1 1 1 0 0 1   SAB IAB Ectopic Multiple Live Births   0 0 0 0 1      # Outcome Date GA Lbr Dawson/2nd Weight Sex Delivery Anes PTL Lv   1 Term         JAYE     Patient Active Problem List   Diagnosis     Peroneal tendonitis     Advanced directives, counseling/discussion     Diverticulitis of large intestine     Graves disease     Pharyngeal dysphagia     Migraine     Chronic congestion of paranasal sinus     Sinus bradycardia     Acute gastric  volvulus     Common bile duct calculus     Disorder of function of stomach     Diverticular disease of large intestine     Dysphagia     Hemorrhoids     Hoarse     Obstruction of esophagus     Osteoarthritis of right knee     Other specified hypothyroidism     Polyp of duodenum     Past Surgical History:   Procedure Laterality Date     CHOLECYSTECTOMY       COSMETIC SURGERY      breast augmentation     ENDOSCOPIC RETROGRADE CHOLANGIOPANCREATOGRAM N/A 2017    Procedure: COMBINED ENDOSCOPIC RETROGRADE CHOLANGIOPANCREATOGRAPHY, SPHINCTEROTOMY;  ENDOSCOPIC RETROGRADE CHOLANGIOPANCREATOGRAM (ERCP) STONE REMOVAL, SPINCHTEROTOMY;  Surgeon: Preston Barnett MD;  Location:  OR     ENT SURGERY  age 26    tonsillectomy     ESOPHAGOSCOPY, GASTROSCOPY, DUODENOSCOPY (EGD), COMBINED N/A 2017    Procedure: COMBINED ENDOSCOPIC ULTRASOUND, ESOPHAGOSCOPY, GASTROSCOPY, DUODENOSCOPY (EGD);  ENDOSCOPIC ULTRASOUND, ESOPHAGOSCOPY, GASTROSCOPY, DUODENOSCOPY (EGD) (MAC SEDATION) ;  Surgeon: Dot Vargas MD;  [facility]: [facility]     ESOPHAGOSCOPY, GASTROSCOPY, DUODENOSCOPY (EGD), COMBINED N/A 2017    Procedure: COMBINED ESOPHAGOSCOPY, GASTROSCOPY, DUODENOSCOPY (EGD), BIOPSY SINGLE OR MULTIPLE;;  Surgeon: Dot Vargas MD;  Location:  GI     GYN SURGERY      tubal ligation x2     HYSTERECTOMY VAGINAL      Dr Grier      Social History     Tobacco Use     Smoking status: Former Smoker     Types: Cigarettes     Quit date: 1983     Years since quittin.1     Smokeless tobacco: Never Used   Substance Use Topics     Alcohol use: Yes     Alcohol/week: 0.0 standard drinks     Comment: 1 drink nightly      Problem (# of Occurrences) Relation (Name,Age of Onset)    Hypertension (2) Other, Mother (89)    No Known Problems (6) Father, Sister, Brother, Maternal Grandmother, Maternal Grandfather, Paternal Grandmother            Current Outpatient Medications   Medication Sig     ascorbic acid 1000 MG  TABS tablet Take 1,000 mg by mouth     cholecalciferol 25 MCG (1000 UT) TABS 1,000 Int'l Units     Cyanocobalamin 50 MCG TABS      docusate sodium (COLACE) 50 MG capsule Take 50 mg by mouth     eletriptan (RELPAX) 20 MG tablet Take 20 mg by mouth     EPINEPHrine (SYMJEPI) 0.3 MG/0.3ML Inject As Directed.     famotidine (PEPCID) 20 MG tablet Take 20 mg by mouth 2 times daily     fluticasone (FLONASE) 50 MCG/ACT nasal spray SHAKE LIQUID AND USE 2 SPRAYS IN EACH NOSTRIL EVERY DAY     Glycerin-Hypromellose- (DRY EYE RELIEF DROPS OP)      levothyroxine (SYNTHROID/LEVOTHROID) 75 MCG tablet Take 1 tablet by mouth daily     liothyronine (CYTOMEL) 5 MCG tablet Take 10 mcg by mouth daily     tolterodine (DETROL) 2 MG tablet Take 1 tablet (2 mg) by mouth 2 times daily     vitamin C-electrolytes (EMERGEN-C) 1000mg vitamin C super orange drink mix Take 1 packet by mouth Mix 1 packet in 4-6oz water and take once or twice daily or as directed.     aspirin 81 MG EC tablet Take 81 mg by mouth daily (Patient not taking: Reported on 2/9/2022)     Misc. Devices (ROLLATOR ULTRA-LIGHT) MISC Walker with front wheels for home use. (Patient not taking: Reported on 2/9/2022)     No current facility-administered medications for this visit.       Allergies   Allergen Reactions     Augmentin Hives and Itching     ##No similarities in side chains, very low to no risk of cross-sensitivity to ANCEF. [facility] Antimicrobial Stewardship Team 10/2019  **she thinks this was due to interaction with another medication and is OK with Amoxicillin  ##No similarities in side chains, very low to no risk of cross-sensitivity to ANCEF. [facility] Antimicrobial Stewardship Team 10/2019  **she thinks this was due to interaction with another medication and is OK with Amoxicillin       Metronidazole Hives and Itching     Strawberry Itching     Throat itching  Throat itching  Throat itching     Imitrex [Sumatriptan] Cramps     Amoxicillin      Citrus Dermatitis     Cold  "sores  Cold sores     Clavulanic Acid      Codeine GI Disturbance     Doxycycline Nausea     Lactose Diarrhea and GI Disturbance     Latex Rash       Past medical, surgical, social and family history were reviewed and updated in EPIC.    ROS:   12 point review of systems negative other than symptoms noted below or in the HPI.  No urinary frequency or dysuria, bladder or kidney problems    EXAM:  /64 (BP Location: Right arm, Patient Position: Sitting, Cuff Size: Adult Regular)   Ht 1.613 m (5' 3.5\")   Wt 66.7 kg (147 lb 1.6 oz)   Breastfeeding No   BMI 25.65 kg/m     BMI: Body mass index is 25.65 kg/m .    EXAM:  Constitutional: Appearance: Well nourished, well developed alert, in no acute distress  Neck:  Lymph Nodes:  No lymphadenopathy present    Thyroid:  Gland size normal, nontender, no nodules or masses present  on palpation  Chest:  Respiratory Effort:  Breathing unlabored  Cardiovascular:Heart    Auscultation:  Regular rate, normal rhythm, no murmurs present  Breasts: Inspection of Breasts:  No lymphadenopathy present., Palpation of Breasts and Axillae:  No masses present on palpation, no breast tenderness., Axillary Lymph Nodes:  No lymphadenopathy present., No nodularity, asymmetry or nipple discharge bilaterally. and Bilateral implants in place  Gastrointestinal:  Abdominal Examination:  Abdomen nontender to palpation, tone normal without     rigidity or guarding, no masses present, umbilicus without lesions    Liver and speen:  No hepatomegaly present, liver nontender to palpation    Hernias:  No hernias present  Lymphatic: Lymph Nodes:  No other lymphadenopathy present  Skin:  General Inspection:  No rashes present, no lesions present, no areas of  discoloration.    Genitalia and Groin:  No rashes present, no lesions present, no areas of  discoloration, no masses present  Neurologic/Psychiatric:    Mental Status:  Oriented X3     Pelvic Exam:  External Genitalia:     Normal appearance for age, " no discharge present, no tenderness present, no inflammatory lesions present, color normal  Vagina:     Normal vaginal vault without central or paravaginal defects, ATROPHIC  Bladder:     tender to palpation  Urethra:   Urethral Body:  Urethra palpation normal, urethra structural support normal   Urethral Meatus:  No erythema or lesions present  Cervix:     Surgically absent  Uterus:     Surgically absent  Adnexa:     No adnexal tenderness present, no adnexal masses present  Perineum:     Perineum within normal limits, no evidence of trauma, no rashes or skin lesions present  Inguinal Lymph Nodes:     No lymphadenopathy present      COUNSELING:   Special attention given to:       Regular exercise       Healthy diet/nutrition       Bladder control       Fall risk prevention    BMI:  Body mass index is 25.65 kg/m .   reports that she quit smoking about 39 years ago. Her smoking use included cigarettes. She has never used smokeless tobacco.    ASSESSMENT:  71 year old female with satisfactory annual exam.    ICD-10-CM    1. Encounter for gynecological examination without abnormal finding  Z01.419    2. Urinary urgency  R39.15 UA without Microscopic - lab collect     tolterodine (DETROL) 2 MG tablet     UA without Microscopic - lab collect     Urine Culture Aerobic Bacterial - lab collect   3. OAB (overactive bladder)  N32.81 tolterodine (DETROL) 2 MG tablet       PLAN:  71-year-old female with a normal postmenopausal GYN exam.  She has significant vaginal atrophy but declines vaginal estrogen and she is not sexually active.  UA: trace blood  We discussed double void and start detrol. M/R/B discussed. RTC in 3 months for med check.       AIMEE Santillan CNP

## 2022-02-11 LAB — BACTERIA UR CULT: NO GROWTH

## 2022-07-08 DIAGNOSIS — G43.909 MIGRAINE WITHOUT STATUS MIGRAINOSUS, NOT INTRACTABLE, UNSPECIFIED MIGRAINE TYPE: Primary | ICD-10-CM

## 2022-07-08 RX ORDER — ELETRIPTAN HYDROBROMIDE 20 MG/1
20 TABLET, FILM COATED ORAL
Qty: 30 TABLET | Refills: 1 | Status: SHIPPED | OUTPATIENT
Start: 2022-07-08 | End: 2024-02-04

## 2022-07-11 ENCOUNTER — TELEPHONE (OUTPATIENT)
Dept: FAMILY MEDICINE | Facility: CLINIC | Age: 72
End: 2022-07-11

## 2022-07-14 ENCOUNTER — TELEPHONE (OUTPATIENT)
Dept: FAMILY MEDICINE | Facility: CLINIC | Age: 72
End: 2022-07-14

## 2022-07-14 DIAGNOSIS — Z53.9 ERRONEOUS ENCOUNTER--DISREGARD: Primary | ICD-10-CM

## 2022-07-20 ENCOUNTER — TRANSFERRED RECORDS (OUTPATIENT)
Dept: FAMILY MEDICINE | Facility: CLINIC | Age: 72
End: 2022-07-20

## 2022-09-13 ENCOUNTER — OFFICE VISIT (OUTPATIENT)
Dept: FAMILY MEDICINE | Facility: CLINIC | Age: 72
End: 2022-09-13

## 2022-09-13 VITALS
DIASTOLIC BLOOD PRESSURE: 82 MMHG | BODY MASS INDEX: 27.03 KG/M2 | TEMPERATURE: 98.1 F | WEIGHT: 155 LBS | RESPIRATION RATE: 16 BRPM | SYSTOLIC BLOOD PRESSURE: 122 MMHG | HEART RATE: 52 BPM | OXYGEN SATURATION: 96 %

## 2022-09-13 DIAGNOSIS — J01.10 SUBACUTE FRONTAL SINUSITIS: Primary | ICD-10-CM

## 2022-09-13 PROCEDURE — 99213 OFFICE O/P EST LOW 20 MIN: CPT | Performed by: FAMILY MEDICINE

## 2022-09-13 RX ORDER — FLUTICASONE PROPIONATE 50 MCG
1 SPRAY, SUSPENSION (ML) NASAL 2 TIMES DAILY
Qty: 16 G | Refills: 3 | Status: SHIPPED | OUTPATIENT
Start: 2022-09-13 | End: 2022-09-14

## 2022-09-13 NOTE — PATIENT INSTRUCTIONS
- try a nasal steroid spray (Flonase or nasonex) 1 puff into each nostril 2 times per day.   - try steam inhalation  - use saline nasal spray.

## 2022-09-13 NOTE — PROGRESS NOTES
SUBJECTIVE:    Giovana Key, is a 71 year old female presenting for the below:     1. 6 week Hx of left sided sinus pressure associated with intermittent left ear pain. Denies any fevers or chills. No coryzal symptoms. Feels constitutionally well otherwise.       OBJECTIVE:  Vitals:    09/13/22 1417   BP: 122/82   Pulse: 52   Resp: 16   Temp: 98.1  F (36.7  C)   SpO2: 96%   Weight: 70.3 kg (155 lb)    Body mass index is 27.03 kg/m .  General: no acute distress, cooperative with exam.  Head: no tenderness to percussion over sinuses.   Eyes: no injection or drainage.  Ears: TM's and canals show no erythema, discharge, or effusion bilaterally.  Throat: moist mucous membranes, no tonsillar exudate or hypertrophy, posterior oral pharynx non-erythematous without lesions.  Neck: supple, no thyroid nodules or enlargement.  Lungs: clear to auscultation bilaterally, normal respiratory effort.  Heart: regular rate, normal S1 and S2, no murmurs.     ASSESSMENT / PLAN:      Subacute frontal sinusitis  No evidence of bacterial infection (no dental pain, no fever, no copious nasal drainage, no exquisite sinus pain on palpation). Possible allergen drive. Discussed using saline nasal stray, steam inhalation and adding in daily nasal steroid spray. Patient expressed understanding and agreement with the plan.      -     fluticasone (FLONASE) 50 MCG/ACT nasal spray; Spray 1 spray into both nostrils 2 times daily

## 2022-09-14 DIAGNOSIS — J01.10 SUBACUTE FRONTAL SINUSITIS: ICD-10-CM

## 2022-09-14 RX ORDER — FLUTICASONE PROPIONATE 50 MCG
1 SPRAY, SUSPENSION (ML) NASAL 2 TIMES DAILY
Qty: 48 G | Refills: 1 | Status: SHIPPED | OUTPATIENT
Start: 2022-09-14 | End: 2024-01-30

## 2022-11-10 ENCOUNTER — TRANSFERRED RECORDS (OUTPATIENT)
Dept: FAMILY MEDICINE | Facility: CLINIC | Age: 72
End: 2022-11-10

## 2023-01-03 ENCOUNTER — OFFICE VISIT (OUTPATIENT)
Dept: FAMILY MEDICINE | Facility: CLINIC | Age: 73
End: 2023-01-03

## 2023-01-03 VITALS
DIASTOLIC BLOOD PRESSURE: 80 MMHG | WEIGHT: 162 LBS | SYSTOLIC BLOOD PRESSURE: 138 MMHG | BODY MASS INDEX: 28.25 KG/M2 | OXYGEN SATURATION: 96 % | TEMPERATURE: 98.4 F | HEART RATE: 58 BPM | RESPIRATION RATE: 16 BRPM

## 2023-01-03 DIAGNOSIS — H69.92 DYSFUNCTION OF LEFT EUSTACHIAN TUBE: Primary | ICD-10-CM

## 2023-01-03 PROCEDURE — 99213 OFFICE O/P EST LOW 20 MIN: CPT | Performed by: FAMILY MEDICINE

## 2023-01-03 RX ORDER — AMOXICILLIN 500 MG/1
CAPSULE ORAL
COMMUNITY
Start: 2022-10-20 | End: 2024-01-30

## 2023-01-03 NOTE — PROGRESS NOTES
SUBJECTIVE:    Giovana Key, is a 72 year old female presenting for the below:     1. Left sided ear pain and pressure. Denies any associated fevers, chills, recent coryzal symptoms. Does get seasonal allergies. Denies change in hearing. No tinnitus.     OBJECTIVE:  Vitals:    01/03/23 1246   BP: 138/80   Pulse: 58   Resp: 16   Temp: 98.4  F (36.9  C)   SpO2: 96%   Weight: 73.5 kg (162 lb)    Body mass index is 28.25 kg/m .  General: no acute distress, cooperative with exam.  Ears:right TM and canals no erythema, discharge, or effusion. Left TM slightly retracted, but with no no erythema, discharge, or effusion.  Throat: moist mucous membranes, no tonsillar exudate or hypertrophy, posterior oral pharynx non-erythematous without lesions.  Neck: supple, no thyroid nodules or enlargement.    ASSESSMENT / PLAN:      Dysfunction of left eustachian tube  Discussed nasal steroid spray, saline nasal rinses, steam inhalation. Further information issued on AVS. Patient expressed understanding and agreement with the plan.

## 2023-02-16 ENCOUNTER — OFFICE VISIT (OUTPATIENT)
Dept: FAMILY MEDICINE | Facility: CLINIC | Age: 73
End: 2023-02-16

## 2023-02-16 VITALS
SYSTOLIC BLOOD PRESSURE: 128 MMHG | HEART RATE: 58 BPM | WEIGHT: 163 LBS | BODY MASS INDEX: 27.83 KG/M2 | OXYGEN SATURATION: 98 % | DIASTOLIC BLOOD PRESSURE: 72 MMHG | TEMPERATURE: 97.5 F | RESPIRATION RATE: 16 BRPM | HEIGHT: 64 IN

## 2023-02-16 DIAGNOSIS — R42 DIZZINESS: ICD-10-CM

## 2023-02-16 DIAGNOSIS — E03.8 OTHER SPECIFIED HYPOTHYROIDISM: ICD-10-CM

## 2023-02-16 DIAGNOSIS — R10.11 RUQ ABDOMINAL PAIN: ICD-10-CM

## 2023-02-16 DIAGNOSIS — R39.9 LOWER URINARY TRACT SYMPTOMS (LUTS): Primary | ICD-10-CM

## 2023-02-16 LAB
% GRANULOCYTES: 62.4 % (ref 42.2–75.2)
BACTERIA (RMG): ABNORMAL
BILIRUBIN (RMG): NEGATIVE
BLOOD (RMG): ABNORMAL
CASTS (RMG): ABNORMAL
COLOR UR: YELLOW
CRYSTAL (RMG): ABNORMAL
EPITHELIAL (RMG): ABNORMAL
ERYTHROCYTE [SEDIMENTATION RATE] IN BLOOD: 17 MM/HR (ref 0–20)
GLUCOSE (RMG): NEGATIVE
HCT VFR BLD AUTO: 40.8 % (ref 35–46)
HEMOGLOBIN: 14 G/DL (ref 11.8–15.5)
KETONE (RMG): NEGATIVE
LEUKOCYTE (RMG): NEGATIVE
LYMPHOCYTES NFR BLD AUTO: 30.2 % (ref 20.5–51.1)
MCH RBC QN AUTO: 31.9 PG (ref 27–31)
MCHC RBC AUTO-ENTMCNC: 34.4 G/DL (ref 33–37)
MCV RBC AUTO: 92.8 FL (ref 80–100)
MONOCYTES NFR BLD AUTO: 7.4 % (ref 1.7–9.3)
MUCOUS (RMG): ABNORMAL
NITRITE (RMG): NEGATIVE
PH UR STRIP: 5 PH (ref 5–9)
PLATELET # BLD AUTO: 239 K/UL (ref 140–450)
PROTEIN (RMG): NEGATIVE
RBC # BLD AUTO: 4.4 X10/CMM (ref 3.7–5.2)
RBC (RMG): ABNORMAL
SP GR UR STRIP: 1 (ref 1–1.02)
UROBILINOGEN (RMG): 0.2
WBC # BLD AUTO: 6.3 X10/CMM (ref 3.8–11)
WBC (RMG): ABNORMAL

## 2023-02-16 PROCEDURE — 36415 COLL VENOUS BLD VENIPUNCTURE: CPT | Performed by: NURSE PRACTITIONER

## 2023-02-16 PROCEDURE — 99214 OFFICE O/P EST MOD 30 MIN: CPT | Performed by: NURSE PRACTITIONER

## 2023-02-16 PROCEDURE — 85651 RBC SED RATE NONAUTOMATED: CPT | Performed by: NURSE PRACTITIONER

## 2023-02-16 PROCEDURE — 81003 URINALYSIS AUTO W/O SCOPE: CPT | Performed by: NURSE PRACTITIONER

## 2023-02-16 PROCEDURE — 85025 COMPLETE CBC W/AUTO DIFF WBC: CPT | Performed by: NURSE PRACTITIONER

## 2023-02-16 RX ORDER — CEPHALEXIN 500 MG/1
CAPSULE ORAL
COMMUNITY
Start: 2023-02-12 | End: 2023-05-17

## 2023-02-16 RX ORDER — MECLIZINE HYDROCHLORIDE 25 MG/1
25 TABLET ORAL
COMMUNITY
Start: 2023-02-12 | End: 2023-02-22

## 2023-02-16 NOTE — PATIENT INSTRUCTIONS
We will call you with lab results tomorrow. Urine culture results likely won't be back until early next week.    Make appointment with ENT to follow-up on ear/dizziness

## 2023-02-16 NOTE — PROGRESS NOTES
"Problem(s) Oriented visit        SUBJECTIVE:                                                    Giovana Key is a 72 year old female who presents to clinic today for the following health issues :    Seen at [facility] in [place] on 2/12/23 for abdominal pain, ear pain and dizziness. Started on Keflex for UTI but patient states she was told it was a kidney infection. Also prescribed Meclizine but didn't start this. States she was seen by ENT last month and was told to come back if dizziness worsened. Feels lightheaded all of the time, which isn't going away. Continues to feel dizzy and has right upper quadrant abdominal pain.    Problem list, Medication list, Allergies, and Medical/Social/Surgical histories reviewed in [facility] and updated as appropriate.   Additional history: as documented    ROS:  5 point ROS completed and negative except noted above, including Gen, CV, Resp, GI,     OBJECTIVE:                                                    /72   Pulse 58   Temp 97.5  F (36.4  C) (Temporal)   Resp 16   Ht 1.613 m (5' 3.5\")   Wt 73.9 kg (163 lb)   SpO2 98%   BMI 28.42 kg/m    Body mass index is 28.42 kg/m .   GENERAL: Elderly female in no acute distress  RESP: lungs clear to auscultation - no rales, rhonchi or wheezes  CV: regular rates and rhythm, normal S1 S2, no S3 or S4 and no murmur, click or rub  ABDOMEN: generalized tenderness  MS: extremities normal- no gross deformities noted  PSYCH: anxious mood     ASSESSMENT/PLAN:                                                      Giovana was seen today for er f/u.    Diagnoses and all orders for this visit:    Lower urinary tract symptoms (LUTS)  -     Urinalysis ([facility])  -     Urine Culture  Routine ([facility])  Does not feel as though she is improving with Keflex    Dizziness  Recommend follow-up with ENT    RUQ abdominal pain  -     CBC with Diff/Plt ([facility])  -     Lipase  Serum ([facility])  -     Sed Rate Westergren ([facility])  -     VENOUS COLLECTION  Labs " ordered    Other specified hypothyroidism  -     TSH ([facility])  -     VENOUS COLLECTION        See Patient Instructions  Patient Instructions   We will call you with lab results tomorrow. Urine culture results likely won't be back until early next week.    Make appointment with ENT to follow-up on ear/dizziness      AIMEE Rivera CNP  [facility]  [phone]    For any issues my office # is [phone]

## 2023-02-17 LAB
LIPASE SERPL-CCNC: 31 U/L (ref 14–85)
TSH BLD-ACNC: 0.85 UIU/ML (ref 0.45–4.5)

## 2023-02-17 NOTE — NURSING NOTE
Faxed request for BMP to be added to blood draw dated 2/16/23  Lisa Kay MA February 17, 2023 12:19 PM

## 2023-02-18 LAB
BUN SERPL-MCNC: 12 MG/DL (ref 8–27)
BUN/CREATININE RATIO: 18 (ref 12–28)
CALCIUM SERPL-MCNC: 9.7 MG/DL (ref 8.7–10.3)
CHLORIDE SERPLBLD-SCNC: 105 MMOL/L (ref 96–106)
CREAT SERPL-MCNC: 0.68 MG/DL (ref 0.57–1)
EGFR: 93 ML/MIN/1.73
GLUCOSE SERPL-MCNC: 94 MG/DL (ref 70–99)
Lab: NO GROWTH
POTASSIUM SERPL-SCNC: 4.4 MMOL/L (ref 3.5–5.2)
SODIUM SERPL-SCNC: 143 MMOL/L (ref 134–144)
SPECIMEN STATUS REPORT: NORMAL
TOTAL CO2: 28 MMOL/L (ref 20–29)
URINE CULTURE: NORMAL

## 2023-02-20 NOTE — NURSING NOTE
Ling Celestin APRN CNP sent to Cam Kay MA Nope that is perfect. Thank you!           Previous Messages     ----- Message -----   From: Cam Kay MA   Sent: 2/17/2023   5:17 PM CST   To: AIMEE Mabry CNP   Subject: RE: add on cmp                                   It went as BMP earlier this afternoon. Would you like me to add liver functions?   cam   ----- Message -----   From: Ling Celestin APRN CNP   Sent: 2/17/2023   4:48 PM CST   To: [facility]   Subject: add on cmp                                       Please add CMP to labs done yesterday under UTI     AIMEE Rivera CNP

## 2023-03-03 ENCOUNTER — TELEPHONE (OUTPATIENT)
Dept: FAMILY MEDICINE | Facility: CLINIC | Age: 73
End: 2023-03-03

## 2023-03-03 NOTE — TELEPHONE ENCOUNTER
Called and spoke with patient regarding lab results per Ling Celestin CNP. She is advised to call clinic with questions or concerns. Tammie Harvey

## 2023-03-03 NOTE — TELEPHONE ENCOUNTER
----- Message from AIMEE Mabry CNP sent at 2/22/2023 11:32 AM CST -----  Please call patient with the following results/message:    Urine culture did not grow any bacteria indicating no further infection present    AIMEE Rivera CNP on 2/22/2023 at 11:32 AM

## 2023-03-14 ENCOUNTER — OFFICE VISIT (OUTPATIENT)
Dept: FAMILY MEDICINE | Facility: CLINIC | Age: 73
End: 2023-03-14

## 2023-03-14 VITALS
HEART RATE: 56 BPM | RESPIRATION RATE: 16 BRPM | SYSTOLIC BLOOD PRESSURE: 148 MMHG | DIASTOLIC BLOOD PRESSURE: 71 MMHG | OXYGEN SATURATION: 96 % | WEIGHT: 165.8 LBS | HEIGHT: 64 IN | BODY MASS INDEX: 28.31 KG/M2

## 2023-03-14 DIAGNOSIS — R10.11 ABDOMINAL PAIN, RIGHT UPPER QUADRANT: ICD-10-CM

## 2023-03-14 DIAGNOSIS — K31.9 DISORDER OF FUNCTION OF STOMACH: Primary | ICD-10-CM

## 2023-03-14 LAB
% GRANULOCYTES: 59.5 % (ref 42.2–75.2)
BACTERIA (RMG): ABNORMAL
BILIRUBIN (RMG): NEGATIVE
BLOOD (RMG): ABNORMAL
CASTS (RMG): ABNORMAL
COLOR UR: YELLOW
CRYSTAL (RMG): ABNORMAL
EPITHELIAL (RMG): ABNORMAL
GLUCOSE (RMG): NEGATIVE
HCT VFR BLD AUTO: 38.8 % (ref 35–46)
HEMOGLOBIN: 13.5 G/DL (ref 11.8–15.5)
KETONE (RMG): NEGATIVE
LEUKOCYTE (RMG): NEGATIVE
LYMPHOCYTES NFR BLD AUTO: 32.5 % (ref 20.5–51.1)
MCH RBC QN AUTO: 32.6 PG (ref 27–31)
MCHC RBC AUTO-ENTMCNC: 35 G/DL (ref 33–37)
MCV RBC AUTO: 93.2 FL (ref 80–100)
MONOCYTES NFR BLD AUTO: 8 % (ref 1.7–9.3)
MUCOUS (RMG): ABNORMAL
NITRITE (RMG): NEGATIVE
PH UR STRIP: 6 PH (ref 5–9)
PLATELET # BLD AUTO: 199 K/UL (ref 140–450)
PROTEIN (RMG): NEGATIVE
RBC # BLD AUTO: 4.16 X10/CMM (ref 3.7–5.2)
RBC (RMG): ABNORMAL
SP GR UR STRIP: 1.01 (ref 1–1.02)
UROBILINOGEN (RMG): 0.2
WBC # BLD AUTO: 5.1 X10/CMM (ref 3.8–11)
WBC (RMG): ABNORMAL

## 2023-03-14 PROCEDURE — 85025 COMPLETE CBC W/AUTO DIFF WBC: CPT | Performed by: FAMILY MEDICINE

## 2023-03-14 PROCEDURE — 99214 OFFICE O/P EST MOD 30 MIN: CPT | Performed by: FAMILY MEDICINE

## 2023-03-14 PROCEDURE — 81003 URINALYSIS AUTO W/O SCOPE: CPT | Performed by: FAMILY MEDICINE

## 2023-03-14 RX ORDER — HYDROCORTISONE VALERATE CREAM 2 MG/G
CREAM TOPICAL 2 TIMES DAILY
COMMUNITY

## 2023-03-14 RX ORDER — METRONIDAZOLE 7.5 MG/G
GEL TOPICAL 2 TIMES DAILY
COMMUNITY

## 2023-03-14 NOTE — LETTER
[facility] [address] [place]  [address]  Phone: [phone]    March 17, 2023      Giovana Key  [address]  [place] [address]              Dear Giovana,  I am writing to report that your included test results are as expected.    Many labs contain some results that are slightly outside of the normal range, I have reviewed any of these results and they require no changes at this time.     Sincerely,   Sriram Viramontes MD/Giovana Schmidt  Results for orders placed or performed in visit on 03/14/23   Comp. Metabolic Panel (14) ([facility])     Status: Abnormal   Result Value Ref Range    Glucose 91 70 - 99 mg/dL    Urea Nitrogen 14 8 - 27 mg/dL    Creatinine 0.63 0.57 - 1.00 mg/dL    eGFR  95 >59 mL/min/1.73    BUN/Creatinine Ratio 22 12 - 28    Sodium 143 134 - 144 mmol/L    Potassium 4.2 3.5 - 5.2 mmol/L    Chloride 105 96 - 106 mmol/L    Total CO2 26 20 - 29 mmol/L    Calcium 9.2 8.7 - 10.3 mg/dL    Protein Total 6.0 6.0 - 8.5 g/dL    Albumin 4.3 3.7 - 4.7 g/dL    Globulin, Total 1.7 1.5 - 4.5 g/dL    A/G Ratio 2.5 (H) 1.2 - 2.2    Bilirubin Total 0.5 0.0 - 1.2 mg/dL    Alkaline Phosphatase 58 44 - 121 IU/L    AST 20 0 - 40 IU/L    ALT 14 0 - 32 IU/L    Narrative    Performed at:  01 - [facility] [address], [place], [place]  [id number]  : Chuckie Vela MD, Phone:  [phone]   CBC with Diff/Plt ([facility])     Status: Abnormal   Result Value Ref Range    WBC x10/cmm 5.1 3.8 - 11.0 x10/cmm    % Lymphocytes 32.5 20.5 - 51.1 %    % Monocytes 8.0 1.7 - 9.3 %    % Granulocytes 59.5 42.2 - 75.2 %    RBC x10/cmm 4.16 3.7 - 5.2 x10/cmm    Hemoglobin 13.5 11.8 - 15.5 g/dl    Hematocrit 38.8 35 - 46 %    MCV 93.2 80 - 100 fL    MCH 32.6 (A) 27.0 - 31.0 pg    MCHC 35.0 33.0 - 37.0 g/dL    Platelet Count 199 140 - 450 K/uL   Urinalysis ([facility])     Status: Abnormal   Result Value Ref Range    Color Urine Yellow     pH Urine  6.0 5 - 9 pH    Specific Gravity Urine 1.015 1.005 - 1.025    PROTEIN ([facility]) Negative Negative    GLUCOSE ([facility]) Negative Negative    KETONE ([facility]) Negative Negative    LEUKOCYTE ([facility]) Negative Negative    BLOOD ([facility]) Trace (A) Negative    Nitrite ([facility]) Negative Negative, Positive    BILIRUBIN ([facility]) Negative Negative    UROBILINOGEN ([facility]) 0.2 0.2 - 1    WBC ([facility]) Rare     RBC ([facility]) None     CRYSTAL ([facility]) None     BACTERIA ([facility]) None     MUCOUS ([facility]) None     CASTS ([facility]) None     EPITHELIAL ([facility]) Rare

## 2023-03-14 NOTE — PROGRESS NOTES
"\"pin pricks on the upper right side for a month\"  More irritating than anything  Worse as day goes along.  Bending     Has acid reflux, will come and go    Problem(s) Oriented visit      ROS:General and Resp. completed and negative except as noted above     HISTORY:   reports current alcohol use.   reports that she quit smoking about 40 years ago. Her smoking use included cigarettes. She has never used smokeless tobacco.    Past Medical History:   Diagnosis Date     Decreased libido      Diverticulitis of colon 2/29/2016     Diverticulitis of colon 2/29/2016     GERD (gastroesophageal reflux disease)      Graves disease 3/14/2016     PONV (postoperative nausea and vomiting)      Renal disease      Thyroid disease      WPW syndrome      Past Surgical History:   Procedure Laterality Date     CHOLECYSTECTOMY       COSMETIC SURGERY      breast augmentation     ENDOSCOPIC RETROGRADE CHOLANGIOPANCREATOGRAM N/A 6/2/2017    Procedure: COMBINED ENDOSCOPIC RETROGRADE CHOLANGIOPANCREATOGRAPHY, SPHINCTEROTOMY;  ENDOSCOPIC RETROGRADE CHOLANGIOPANCREATOGRAM (ERCP) STONE REMOVAL, SPINCHTEROTOMY;  Surgeon: Preston Barnett MD;  Location:  OR     ENT SURGERY  age 26    tonsillectomy     ESOPHAGOSCOPY, GASTROSCOPY, DUODENOSCOPY (EGD), COMBINED N/A 5/23/2017    Procedure: COMBINED ENDOSCOPIC ULTRASOUND, ESOPHAGOSCOPY, GASTROSCOPY, DUODENOSCOPY (EGD);  ENDOSCOPIC ULTRASOUND, ESOPHAGOSCOPY, GASTROSCOPY, DUODENOSCOPY (EGD) (MAC SEDATION) ;  Surgeon: Dot Vargas MD;  Location:  GI     ESOPHAGOSCOPY, GASTROSCOPY, DUODENOSCOPY (EGD), COMBINED N/A 5/23/2017    Procedure: COMBINED ESOPHAGOSCOPY, GASTROSCOPY, DUODENOSCOPY (EGD), BIOPSY SINGLE OR MULTIPLE;;  Surgeon: Dot Vargas MD;  Location:  GI     GYN SURGERY      tubal ligation x2     HYSTERECTOMY VAGINAL  1997    Dr Grier       EXAM:  BP: 148/71   Pulse: 56    Temp: Data Unavailable    Wt Readings from Last 2 Encounters:   03/14/23 75.2 kg (165 lb " "12.8 oz)   02/16/23 73.9 kg (163 lb)       BMI= Body mass index is 28.91 kg/m .    EXAM:  APPEARANCE: = Relaxed and in no distress  PERRLA/Irises = Pupils Round Reactive to Light and Irisis without inflammation  Neck = No anterior or posterior adenopathy appreciated.  Resp effort = Calm regular breathing  Breath Sounds = Good air movement with no rales or rhonchi in any lung fields  Heart Rate, Rythym, & sounds (no Murm)  = Regular rate and rythym with no S3, S4, or murmer appreciated.  Abdomen = Soft, nontender, no masses, & bowel sounds in all quadrants  Liver/Spleen = Normal span and no splenomegaly noted      Assessment/Plan:  Giovana was seen today for gastrointestinal problem and gastrophageal reflux.    Diagnoses and all orders for this visit:    Disorder of function of stomach    Abdominal pain, right upper quadrant  Large ddx, symptoms are minor   Early Kidney stone, lower ribs cathcing pelvis.  Colon inflmaation early?  Quit coffee.  Has started camimele tea...  -     Comp. Metabolic Panel (14) ([facility])  -     CBC with Diff/Plt ([facility])  -     Urinalysis ([facility])        COUNSELING:   reports that she quit smoking about 40 years ago. Her smoking use included cigarettes. She has never used smokeless tobacco.    Estimated body mass index is 28.91 kg/m  as calculated from the following:    Height as of this encounter: 1.613 m (5' 3.5\").    Weight as of this encounter: 75.2 kg (165 lb 12.8 oz).       Appropriate preventive services were discussed with this patient, including applicable screening as appropriate for cardiovascular disease, diabetes, osteopenia/osteoporosis, and glaucoma.  As appropriate for age/gender, discussed screening for colorectal cancer, prostate cancer, breast cancer, and cervical cancer. Checklist reviewing preventive services available has been given to the patient.    Reviewed patients plan of care and provided an AVS. The Basic Care Plan (routine screening as documented in Health Maintenance) " for Giovana meets the Care Plan requirement. This Care Plan has been established and reviewed with the  Patient.      The following health maintenance items are reviewed in Epic and correct as of today:  Health Maintenance   Topic Date Due     DTAP/TDAP/TD IMMUNIZATION (2 - Td or Tdap) 04/07/2021     FALL RISK ASSESSMENT  06/18/2022     PHQ-2 (once per calendar year)  01/01/2023     MEDICARE ANNUAL WELLNESS VISIT  02/09/2023     ZOSTER IMMUNIZATION (2 of 2) 11/23/2022     MAMMO SCREENING  02/09/2023     LIPID  04/04/2023     TSH W/FREE T4 REFLEX  02/16/2024     ADVANCE CARE PLANNING  12/17/2024     COLORECTAL CANCER SCREENING  01/17/2027     DEXA  12/04/2033     HEPATITIS C SCREENING  Completed     MIGRAINE ACTION PLAN  Completed     INFLUENZA VACCINE  Completed     Pneumococcal Vaccine: 65+ Years  Completed     COVID-19 Vaccine  Completed     IPV IMMUNIZATION  Aged Out     MENINGITIS IMMUNIZATION  Aged Out       Sriram Viramontes  [facility]  For any issues my office # is [phone]

## 2023-03-15 LAB
ALBUMIN SERPL-MCNC: 4.3 G/DL (ref 3.7–4.7)
ALBUMIN/GLOB SERPL: 2.5 {RATIO} (ref 1.2–2.2)
ALP SERPL-CCNC: 58 IU/L (ref 44–121)
ALT SERPL-CCNC: 14 IU/L (ref 0–32)
AST SERPL-CCNC: 20 IU/L (ref 0–40)
BILIRUB SERPL-MCNC: 0.5 MG/DL (ref 0–1.2)
BUN SERPL-MCNC: 14 MG/DL (ref 8–27)
BUN/CREATININE RATIO: 22 (ref 12–28)
CALCIUM SERPL-MCNC: 9.2 MG/DL (ref 8.7–10.3)
CHLORIDE SERPLBLD-SCNC: 105 MMOL/L (ref 96–106)
CREAT SERPL-MCNC: 0.63 MG/DL (ref 0.57–1)
EGFR: 95 ML/MIN/1.73
GLOBULIN, TOTAL: 1.7 G/DL (ref 1.5–4.5)
GLUCOSE SERPL-MCNC: 91 MG/DL (ref 70–99)
POTASSIUM SERPL-SCNC: 4.2 MMOL/L (ref 3.5–5.2)
PROT SERPL-MCNC: 6 G/DL (ref 6–8.5)
SODIUM SERPL-SCNC: 143 MMOL/L (ref 134–144)
TOTAL CO2: 26 MMOL/L (ref 20–29)

## 2023-03-15 NOTE — RESULT ENCOUNTER NOTE
Dear Giovana,     I am writing to report that your included test results are as expected.    Many labs contain some results that are slightly outside of the normal range, I have reviewed any of these results and they require no changes at this time.    Sriram Viramontes MD

## 2023-04-13 DIAGNOSIS — T78.40XD ALLERGIC REACTION, SUBSEQUENT ENCOUNTER: Primary | ICD-10-CM

## 2023-04-14 RX ORDER — EPINEPHRINE 0.3 MG/.3ML
INJECTION SUBCUTANEOUS
Qty: 2 EACH | Refills: 3 | Status: SHIPPED | OUTPATIENT
Start: 2023-04-14

## 2023-04-25 ENCOUNTER — TRANSFERRED RECORDS (OUTPATIENT)
Dept: FAMILY MEDICINE | Facility: CLINIC | Age: 73
End: 2023-04-25

## 2023-04-25 LAB
CREATININE (EXTERNAL): 0.7 MG/DL (ref 0.57–1)
GFR ESTIMATED (EXTERNAL): 0 ML/MIN/1.73M2
GFR ESTIMATED (IF AFRICAN AMERICAN) (EXTERNAL): 0 ML/MIN/1.73M2
TSH SERPL-ACNC: 0.38 UIU/ML (ref 0.45–4.5)

## 2023-05-17 ENCOUNTER — OFFICE VISIT (OUTPATIENT)
Dept: FAMILY MEDICINE | Facility: CLINIC | Age: 73
End: 2023-05-17

## 2023-05-17 VITALS
OXYGEN SATURATION: 97 % | HEART RATE: 56 BPM | SYSTOLIC BLOOD PRESSURE: 132 MMHG | DIASTOLIC BLOOD PRESSURE: 66 MMHG | WEIGHT: 163.6 LBS | BODY MASS INDEX: 27.93 KG/M2 | HEIGHT: 64 IN

## 2023-05-17 DIAGNOSIS — R13.13 PHARYNGEAL DYSPHAGIA: ICD-10-CM

## 2023-05-17 DIAGNOSIS — Z01.818 PREOP GENERAL PHYSICAL EXAM: Primary | ICD-10-CM

## 2023-05-17 DIAGNOSIS — E05.00 GRAVES DISEASE: ICD-10-CM

## 2023-05-17 DIAGNOSIS — K22.2 SCHATZKI'S RING: ICD-10-CM

## 2023-05-17 DIAGNOSIS — I45.6 WOLFF-PARKINSON-WHITE (WPW) SYNDROME: ICD-10-CM

## 2023-05-17 DIAGNOSIS — I47.19 PAROXYSMAL ATRIAL TACHYCARDIA (H): ICD-10-CM

## 2023-05-17 PROBLEM — K31.89 ACUTE GASTRIC VOLVULUS: Status: RESOLVED | Noted: 2017-04-05 | Resolved: 2023-05-17

## 2023-05-17 PROBLEM — K80.50 COMMON BILE DUCT CALCULUS: Status: RESOLVED | Noted: 2017-05-26 | Resolved: 2023-05-17

## 2023-05-17 PROCEDURE — 99214 OFFICE O/P EST MOD 30 MIN: CPT | Performed by: FAMILY MEDICINE

## 2023-05-17 RX ORDER — TESTOSTERONE 10 MG/.5G
10 GEL, METERED TOPICAL
COMMUNITY

## 2023-05-17 RX ORDER — FINASTERIDE 0.1% / MINOXIDIL 7% .1; 7 G/100G; G/100G
SOLUTION TOPICAL
COMMUNITY

## 2023-05-17 RX ORDER — BETAMETHASONE DIPROPIONATE 0.5 MG/G
LOTION TOPICAL
COMMUNITY
Start: 2023-05-03

## 2023-05-17 NOTE — PATIENT INSTRUCTIONS
For informational purposes only. Not to replace the advice of your health care provider. Copyright   2003,  [employer] [facility]. All rights reserved. Clinically reviewed by Brandi Miranda MD. [id number] - REV .  Preparing for Your Surgery  Getting started  A nurse will call you to review your health history and instructions. They will give you an arrival time based on your scheduled surgery time. Please be ready to share:  Your doctor's clinic name and phone number  Your medical, surgical, and anesthesia history  A list of allergies and sensitivities  A list of medicines, including herbal treatments and over-the-counter drugs  Whether the patient has a legal guardian (ask how to send us the papers in advance)  Please tell us if you're pregnant--or if there's any chance you might be pregnant. Some surgeries may injure a fetus (unborn baby), so they require a pregnancy test. Surgeries that are safe for a fetus don't always need a test, and you can choose whether to have one.   If you have a child who's having surgery, please ask for a copy of Preparing for Your Child's Surgery.    Preparing for surgery  Within 10 to 30 days of surgery: Have a pre-op exam (sometimes called an H&P, or History and Physical). This can be done at a clinic or pre-operative center.  If you're having a , you may not need this exam. Talk to your care team.  At your pre-op exam, talk to your care team about all medicines you take. If you need to stop any medicines before surgery, ask when to start taking them again.  We do this for your safety. Many medicines can make you bleed too much during surgery. Some change how well surgery (anesthesia) drugs work.  Call your insurance company to let them know you're having surgery. (If you don't have insurance, call [phone].)  Call your clinic if there's any change in your health. This includes signs of a cold or flu (sore throat, runny nose, cough, rash, fever). It  also includes a scrape or scratch near the surgery site.  If you have questions on the day of surgery, call your hospital or surgery center.  Eating and drinking guidelines  For your safety: Unless your surgeon tells you otherwise, follow the guidelines below.  Eat and drink as usual until 8 hours before you arrive for surgery. After that, no food or milk.  Drink clear liquids until 2 hours before you arrive. These are liquids you can see through, like water, Gatorade, and Propel Water. They also include plain black coffee and tea (no cream or milk), candy, and breath mints. You can spit out gum when you arrive.  If you drink alcohol: Stop drinking it the night before surgery.  If your care team tells you to take medicine on the morning of surgery, it's okay to take it with a sip of water.  Preventing infection  Shower or bathe the night before and morning of your surgery. Follow the instructions your clinic gave you. (If no instructions, use regular soap.)  Don't shave or clip hair near your surgery site. We'll remove the hair if needed.  Don't smoke or vape the morning of surgery. You may chew nicotine gum up to 2 hours before surgery. A nicotine patch is okay.  Note: Some surgeries require you to completely quit smoking and nicotine. Check with your surgeon.  Your care team will make every effort to keep you safe from infection. We will:  Clean our hands often with soap and water (or an alcohol-based hand rub).  Clean the skin at your surgery site with a special soap that kills germs.  Give you a special gown to keep you warm. (Cold raises the risk of infection.)  Wear special hair covers, masks, gowns and gloves during surgery.  Give antibiotic medicine, if prescribed. Not all surgeries need antibiotics.  What to bring on the day of surgery  Photo ID and insurance card  Copy of your health care directive, if you have one  Glasses and hearing aids (bring cases)  You can't wear contacts during surgery  Inhaler and  eye drops, if you use them (tell us about these when you arrive)  CPAP machine or breathing device, if you use them  A few personal items, if spending the night  If you have . . .  A pacemaker, ICD (cardiac defibrillator) or other implant: Bring the ID card.  An implanted stimulator: Bring the remote control.  A legal guardian: Bring a copy of the certified (court-stamped) guardianship papers.  Please remove any jewelry, including body piercings. Leave jewelry and other valuables at home.  If you're going home the day of surgery  You must have a responsible adult drive you home. They should stay with you overnight as well.  If you don't have someone to stay with you, and you aren't safe to go home alone, we may keep you overnight. Insurance often won't pay for this.  After surgery  If it's hard to control your pain or you need more pain medicine, please call your surgeon's office.  Questions?   If you have any questions for your care team, list them here: _________________________________________________________________________________________________________________________________________________________________________ ____________________________________ ____________________________________ ____________________________________    How to Take Your Medication Before Surgery  - Take all of your medications before surgery as usual

## 2023-05-17 NOTE — PROGRESS NOTES
[facility] [address] [place] [address]  Phone: [phone]  Fax: [phone]  Primary Provider: Gato Yost  Pre-op Performing Provider: GTAO YOST      PREOPERATIVE EVALUATION:  Today's date: 5/17/2023    Giovana Key is a 72 year old female who presents for a preoperative evaluation.    Surgical Information:  Surgery/Procedure: Endoscopy  Surgery Location: [facility]  Surgeon: Dr Gonzalez  Surgery Date: 5/22/2023  Time of Surgery: 900am  Where patient plans to recover: At home with family  Fax number for surgical facility: [phone]    Assessment & Plan     The proposed surgical procedure is considered LOW risk.    Preop general physical exam  No apparent contraindications to planned procedure    Pharyngeal dysphagia  Planned EGD and possible dilation    Schatzki's ring  See above    Graves disease  TSH low recently, has follow up with Dr Ibarra to discuss.  She is asymptomatic    Paroxysmal atrial tachycardia (H)  Asymptomatic, followed by cardiology, not on medication    Rand-Parkinson-White (WPW) syndrome  Remote, noted on EKG but asymptomatic and not requiring intervention           - No identified additional risk factors other than previously addressed    Antiplatelet or Anticoagulation Medication Instructions:   - Patient is on no antiplatelet or anticoagulation medications.    Additional Medication Instructions:  Patient is to take all scheduled medications on the day of surgery    RECOMMENDATION:  APPROVAL GIVEN to proceed with proposed procedure, without further diagnostic evaluation.      Preoperative Questionnaire:   No - Have you ever had a heart attack or stroke?  No - Have you ever had surgery on your heart or blood vessels, such as a stent, coronary (heart) bypass, or surgery on an artery in the head, neck, heart, or legs?  No - Do you have chest pain when you are physically active?  No - Do you have a history of heart failure?  No - Do you  currently have a cold, bronchitis, or symptoms of other respiratory (head and chest) infections?  No - Do you have a cough, shortness of breath, or wheezing?  No - Do you or anyone in your family have a history of blood clots?  No - Do you or anyone in your family have a serious bleeding problem, such as long-lasting bleeding after surgeries or cuts?  No - Have you ever had anemia or been told to take iron pills?  No - Have you had any abnormal blood loss such as black, tarry or bloody stools, or abnormal vaginal bleeding?  No - Have you ever had a blood transfusion?  Yes - Are you willing to have a blood transfusion if it is medically needed before, during, or after your surgery?  No - Have you or anyone in your family ever had problems with anesthesia (sedation for surgery)?  No - Do you have sleep apnea, excessive snoring, or daytime drowsiness?   No - Do you have any artifical heart valves or other implanted medical devices, such as a pacemaker, defibrillator, or continuous glucose monitor?  YES, full knee replacement - DO YOU HAVE ANY ARTIFICIAL JOINTS?   YES, causes a rash - ARE YOU ALLERGIC TO LATEX?   No - Is there any chance that you may be pregnant?      Subjective       HPI related to upcoming procedure: here for preoperative risk assessment prior to upcoming EGD.    Dysphagia and history of schatski's ring.  Possible dilation planned.    Graves disease: now hypothyroid on replacement.  TSH low recently, followed by endocrinology.  Has follow up scheduled with Dr Ibarra.    PAT and WPW: followed by cardiology.  Asymptomatic.             View : No data to display.              Health Care Directive:  Patient does not have a Health Care Directive or Living Will: Discussed advance care planning with patient; information given to patient to review.    Preoperative Review of :   reviewed - no record of controlled substances prescribed.      Status of Chronic Conditions:  See problem list for active medical  problems.  Problems all longstanding and stable, except as noted/documented.  See ROS for pertinent symptoms related to these conditions.      Review of Systems  CONSTITUTIONAL: NEGATIVE for fever, chills, change in weight  INTEGUMENTARY/SKIN: NEGATIVE for worrisome rashes, moles or lesions  EYES: NEGATIVE for vision changes or irritation  ENT/MOUTH: NEGATIVE for ear, mouth and throat problems  RESP: NEGATIVE for significant cough or SOB  CV: NEGATIVE for chest pain, palpitations or peripheral edema  GI: NEGATIVE for nausea, abdominal pain, heartburn, or change in bowel habits  : NEGATIVE for frequency, dysuria, or hematuria  MUSCULOSKELETAL: NEGATIVE for significant arthralgias or myalgia  NEURO: NEGATIVE for weakness, dizziness or paresthesias  ENDOCRINE: NEGATIVE for temperature intolerance, skin/hair changes  HEME: NEGATIVE for bleeding problems  PSYCHIATRIC: NEGATIVE for changes in mood or affect    Patient Active Problem List    Diagnosis Date Noted     Dysphagia 02/09/2022     Priority: Medium     Osteoarthritis of right knee 08/25/2021     Priority: Medium     Other specified hypothyroidism 08/25/2021     Priority: Medium     Sinus bradycardia 07/13/2021     Priority: Medium     Hoarse 06/24/2021     Priority: Medium     Migraine 04/04/2018     Priority: Medium     Chronic congestion of paranasal sinus 04/04/2018     Priority: Medium     Common bile duct calculus 05/26/2017     Priority: Medium     Polyp of duodenum 04/07/2017     Priority: Medium     Acute gastric volvulus 04/05/2017     Priority: Medium     Disorder of function of stomach 04/05/2017     Priority: Medium     Obstruction of esophagus 04/05/2017     Priority: Medium     Pharyngeal dysphagia 02/27/2017     Priority: Medium     Diverticular disease of large intestine 01/17/2017     Priority: Medium     Hemorrhoids 01/17/2017     Priority: Medium     Graves disease 03/14/2016     Priority: Medium     Diverticulitis of large intestine  02/29/2016     Priority: Medium     Advanced directives, counseling/discussion 07/31/2015     Priority: Medium     Advanced Care Planning 7/31/2015: Patient states they have received and completed their Advanced Care directive and they will bring in a copy for us to scan into their chart.  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     Thyroid disease      WPW syndrome      Past Surgical History:   Procedure Laterality Date     CHOLECYSTECTOMY       COSMETIC SURGERY      breast augmentation     ENDOSCOPIC RETROGRADE CHOLANGIOPANCREATOGRAM N/A 6/2/2017    Procedure: COMBINED ENDOSCOPIC RETROGRADE CHOLANGIOPANCREATOGRAPHY, SPHINCTEROTOMY;  ENDOSCOPIC RETROGRADE CHOLANGIOPANCREATOGRAM (ERCP) STONE REMOVAL, SPINCHTEROTOMY;  Surgeon: Preston Barnett MD;  Location:  OR     ENT SURGERY  age 26    tonsillectomy     ESOPHAGOSCOPY, GASTROSCOPY, DUODENOSCOPY (EGD), COMBINED N/A 5/23/2017    Procedure: COMBINED ENDOSCOPIC ULTRASOUND, ESOPHAGOSCOPY, GASTROSCOPY, DUODENOSCOPY (EGD);  ENDOSCOPIC ULTRASOUND, ESOPHAGOSCOPY, GASTROSCOPY, DUODENOSCOPY (EGD) (MAC SEDATION) ;  Surgeon: Dot Vargas MD;  Location:  GI     ESOPHAGOSCOPY, GASTROSCOPY, DUODENOSCOPY (EGD), COMBINED N/A 5/23/2017    Procedure: COMBINED ESOPHAGOSCOPY, GASTROSCOPY, DUODENOSCOPY (EGD), BIOPSY SINGLE OR MULTIPLE;;  Surgeon: Dot Vargas MD;  Location:  GI     GYN SURGERY      tubal  ligation x2     HYSTERECTOMY VAGINAL  1997    Dr Grier     Current Outpatient Medications   Medication Sig Dispense Refill     amoxicillin (AMOXIL) 500 MG capsule Before dental procedures.       ascorbic acid 1000 MG TABS tablet Take 1,000 mg by mouth       aspirin (ASA) 81 MG EC tablet Take 81 mg by mouth (Patient not taking: Reported on 3/14/2023)       cephALEXin (KEFLEX) 500 MG capsule  (Patient not taking: Reported on 3/14/2023)       cholecalciferol 25 MCG (1000 UT) TABS 1,000 Int'l Units       Cyanocobalamin 50 MCG TABS        docusate sodium (COLACE) 50 MG capsule Take 50 mg by mouth       eletriptan (RELPAX) 20 MG tablet Take 1 tablet (20 mg) by mouth at onset of headache for migraine 30 tablet 1     EPINEPHrine (ANY BX GENERIC EQUIV) 0.3 MG/0.3ML injection 2-pack INJECT 0.3MLS(0.3MG) INTO TO MUSCLE AS NEEDED FOR ANAPHYLAXIS 2 each 3     famotidine (PEPCID) 20 MG tablet Take 20 mg by mouth 2 times daily (Patient not taking: Reported on 3/14/2023)       fluticasone (FLONASE) 50 MCG/ACT nasal spray Spray 1 spray into both nostrils 2 times daily (Patient not taking: Reported on 3/14/2023) 48 g 1     Glycerin-Hypromellose- (DRY EYE RELIEF DROPS OP)        hydrocortisone (WESTCORT) 0.2 % external cream Apply topically 2 times daily       levothyroxine (SYNTHROID/LEVOTHROID) 75 MCG tablet Take 1 tablet by mouth daily       liothyronine (CYTOMEL) 5 MCG tablet Take 10 mcg by mouth daily       metroNIDAZOLE (METROGEL) 0.75 % external gel Apply topically 2 times daily       vitamin C-electrolytes (EMERGEN-C) 1000mg vitamin C super orange drink mix Take 1 packet by mouth Mix 1 packet in 4-6oz water and take once or twice daily or as directed. (Patient not taking: Reported on 3/14/2023)         Allergies   Allergen Reactions     Amoxicillin-Pot Clavulanate Hives and Itching     ##No similarities in side chains, very low to no risk of cross-sensitivity to ANCEF. [facility] 10/2019  **she  thinks this was due to interaction with another medication and is OK with Amoxicillin  ##No similarities in side chains, very low to no risk of cross-sensitivity to ANCEF. [facility] 10/2019  **she thinks this was due to interaction with another medication and is OK with Amoxicillin       Metronidazole Hives and Itching     Strawberry Extract Itching     Throat itching  Throat itching  Throat itching     Imitrex [Sumatriptan] Cramps     Amoxicillin      Citrus Dermatitis     Cold sores  Cold sores     Clavulanic Acid      Codeine GI Disturbance     Doxycycline Nausea     Lactose Diarrhea and GI Disturbance     Latex Rash        Social History     Tobacco Use     Smoking status: Former     Types: Cigarettes     Quit date: 1983     Years since quittin.4     Smokeless tobacco: Never   Vaping Use     Vaping status: Not on file   Substance Use Topics     Alcohol use: Yes     Alcohol/week: 0.0 standard drinks of alcohol     Comment: 1 drink nightly     Family History   Problem Relation Age of Onset     Hypertension Other      Hypertension Mother 89     No Known Problems Father      No Known Problems Sister      No Known Problems Brother      No Known Problems Maternal Grandmother      No Known Problems Maternal Grandfather      No Known Problems Paternal Grandmother      History   Drug Use No         Objective     There were no vitals taken for this visit.    Physical Exam    GENERAL APPEARANCE: healthy, alert and no distress     EYES: EOMI, PERRL     HENT: ear canals and TM's normal and nose and mouth without ulcers or lesions     NECK: no adenopathy, no asymmetry, masses, or scars and thyroid normal to palpation     RESP: lungs clear to auscultation - no rales, rhonchi or wheezes     CV: regular rates and rhythm, normal S1 S2, no S3 or S4 and no murmur, click or rub     ABDOMEN:  soft, nontender, no HSM or masses and bowel sounds normal     MS: extremities normal- no gross deformities  noted, no evidence of inflammation in joints, FROM in all extremities.     SKIN: no suspicious lesions or rashes     NEURO: Normal strength and tone, sensory exam grossly normal, mentation intact and speech normal     PSYCH: mentation appears normal. and affect normal/bright     LYMPHATICS: No cervical adenopathy    Recent Labs   Lab Test 03/14/23  1140 03/14/23  0000 02/16/23  1522 02/16/23  0000 08/03/21  1602 08/03/21  1545   HGB  --  13.5  --  14.0   < >  --    PLT  --  199  --  239   < >  --      --  143  --   --   --    POTASSIUM 4.2  --  4.4  --   --   --    CR 0.63  --  0.68  --   --   --    A1C  --   --   --   --   --  5.7*    < > = values in this interval not displayed.        Diagnostics:  No labs were ordered during this visit.   No EKG this visit, completed in the last 90 days.    Revised Cardiac Risk Index (RCRI):  The patient has the following serious cardiovascular risks for perioperative complications:   - No serious cardiac risks = 0 points     RCRI Interpretation: 0 points: Class I (very low risk - 0.4% complication rate)           Signed Electronically by: Gato Vincent MD  Copy of this evaluation report is provided to requesting physician.

## 2023-05-19 NOTE — PROGRESS NOTES
5/17/23 faxed this preop to DERRICK @ [phone]    Bk Rehman,   [facility]  [phone]

## 2023-06-14 ENCOUNTER — TRANSFERRED RECORDS (OUTPATIENT)
Dept: FAMILY MEDICINE | Facility: CLINIC | Age: 73
End: 2023-06-14

## 2023-06-15 LAB — TSH SERPL-ACNC: 0.48 UIU/ML (ref 0.47–4.68)

## 2023-07-13 ENCOUNTER — TRANSFERRED RECORDS (OUTPATIENT)
Dept: FAMILY MEDICINE | Facility: CLINIC | Age: 73
End: 2023-07-13

## 2023-07-21 ENCOUNTER — HOSPITAL ENCOUNTER (OUTPATIENT)
Dept: BONE DENSITY | Facility: CLINIC | Age: 73
Discharge: HOME | End: 2023-07-21
Attending: INTERNAL MEDICINE | Admitting: INTERNAL MEDICINE
Payer: COMMERCIAL

## 2023-07-21 DIAGNOSIS — Z00.00 HEALTH CARE MAINTENANCE: ICD-10-CM

## 2023-07-21 DIAGNOSIS — Z78.0 POSTMENOPAUSAL: ICD-10-CM

## 2023-07-21 PROCEDURE — 77080 DXA BONE DENSITY AXIAL: CPT

## 2023-08-30 LAB — TSH SERPL-ACNC: 0.65 UIU/ML (ref 0.47–4.68)

## 2023-09-05 ENCOUNTER — TRANSFERRED RECORDS (OUTPATIENT)
Dept: FAMILY MEDICINE | Facility: CLINIC | Age: 73
End: 2023-09-05

## 2023-09-25 ENCOUNTER — TRANSFERRED RECORDS (OUTPATIENT)
Dept: FAMILY MEDICINE | Facility: CLINIC | Age: 73
End: 2023-09-25

## 2023-09-30 ENCOUNTER — TRANSFERRED RECORDS (OUTPATIENT)
Dept: FAMILY MEDICINE | Facility: CLINIC | Age: 73
End: 2023-09-30

## 2023-10-02 ENCOUNTER — TRANSFERRED RECORDS (OUTPATIENT)
Dept: FAMILY MEDICINE | Facility: CLINIC | Age: 73
End: 2023-10-02

## 2023-12-04 ENCOUNTER — TRANSFERRED RECORDS (OUTPATIENT)
Dept: FAMILY MEDICINE | Facility: CLINIC | Age: 73
End: 2023-12-04

## 2023-12-08 ENCOUNTER — ANCILLARY PROCEDURE (OUTPATIENT)
Dept: CT IMAGING | Facility: CLINIC | Age: 73
End: 2023-12-08
Attending: NURSE PRACTITIONER
Payer: COMMERCIAL

## 2023-12-08 DIAGNOSIS — R10.9 RIGHT SIDED ABDOMINAL PAIN: ICD-10-CM

## 2023-12-08 LAB
CREAT BLD-MCNC: 0.8 MG/DL (ref 0.5–1)
EGFRCR SERPLBLD CKD-EPI 2021: >60 ML/MIN/1.73M2

## 2023-12-08 PROCEDURE — 74177 CT ABD & PELVIS W/CONTRAST: CPT | Performed by: STUDENT IN AN ORGANIZED HEALTH CARE EDUCATION/TRAINING PROGRAM

## 2023-12-08 PROCEDURE — 82565 ASSAY OF CREATININE: CPT | Performed by: PATHOLOGY

## 2023-12-08 RX ORDER — IOPAMIDOL 755 MG/ML
85 INJECTION, SOLUTION INTRAVASCULAR ONCE
Status: COMPLETED | OUTPATIENT
Start: 2023-12-08 | End: 2023-12-08

## 2023-12-08 RX ADMIN — IOPAMIDOL 85 ML: 755 INJECTION, SOLUTION INTRAVASCULAR at 17:10

## 2023-12-08 NOTE — DISCHARGE INSTRUCTIONS

## 2023-12-12 ENCOUNTER — TRANSFERRED RECORDS (OUTPATIENT)
Dept: FAMILY MEDICINE | Facility: CLINIC | Age: 73
End: 2023-12-12

## 2023-12-13 ENCOUNTER — TRANSFERRED RECORDS (OUTPATIENT)
Dept: FAMILY MEDICINE | Facility: CLINIC | Age: 73
End: 2023-12-13

## 2024-01-30 ENCOUNTER — OFFICE VISIT (OUTPATIENT)
Dept: FAMILY MEDICINE | Facility: CLINIC | Age: 74
End: 2024-01-30

## 2024-01-30 ENCOUNTER — LAB (OUTPATIENT)
Dept: LAB | Facility: CLINIC | Age: 74
End: 2024-01-30
Payer: COMMERCIAL

## 2024-01-30 VITALS
SYSTOLIC BLOOD PRESSURE: 98 MMHG | DIASTOLIC BLOOD PRESSURE: 73 MMHG | BODY MASS INDEX: 28.6 KG/M2 | OXYGEN SATURATION: 97 % | HEART RATE: 54 BPM | HEIGHT: 63 IN | WEIGHT: 161.4 LBS

## 2024-01-30 DIAGNOSIS — I45.6 WOLFF-PARKINSON-WHITE (WPW) SYNDROME: ICD-10-CM

## 2024-01-30 DIAGNOSIS — Z13.228 ENCOUNTER FOR SCREENING FOR METABOLIC DISORDER: ICD-10-CM

## 2024-01-30 DIAGNOSIS — Z77.29 CARBON MONOXIDE EXPOSURE: ICD-10-CM

## 2024-01-30 DIAGNOSIS — M54.42 CHRONIC RIGHT-SIDED LOW BACK PAIN WITH LEFT-SIDED SCIATICA: ICD-10-CM

## 2024-01-30 DIAGNOSIS — I47.19 PAROXYSMAL ATRIAL TACHYCARDIA (H): ICD-10-CM

## 2024-01-30 DIAGNOSIS — E03.8 OTHER SPECIFIED HYPOTHYROIDISM: ICD-10-CM

## 2024-01-30 DIAGNOSIS — Z13.6 ENCOUNTER FOR SCREENING FOR CARDIOVASCULAR DISORDERS: ICD-10-CM

## 2024-01-30 DIAGNOSIS — G89.29 CHRONIC RIGHT-SIDED LOW BACK PAIN WITH LEFT-SIDED SCIATICA: ICD-10-CM

## 2024-01-30 DIAGNOSIS — Z00.00 ENCOUNTER FOR MEDICARE ANNUAL WELLNESS EXAM: Primary | ICD-10-CM

## 2024-01-30 DIAGNOSIS — I47.10 PAROXYSMAL SUPRAVENTRICULAR TACHYCARDIA (H): ICD-10-CM

## 2024-01-30 DIAGNOSIS — N32.89 BLADDER WALL THICKENING: ICD-10-CM

## 2024-01-30 DIAGNOSIS — E05.00 GRAVES DISEASE: ICD-10-CM

## 2024-01-30 LAB
ALBUMIN SERPL BCG-MCNC: 4 G/DL (ref 3.5–5.2)
ALP SERPL-CCNC: 59 U/L (ref 40–150)
ALT SERPL W P-5'-P-CCNC: 30 U/L (ref 0–50)
ANION GAP SERPL CALCULATED.3IONS-SCNC: 9 MMOL/L (ref 7–15)
AST SERPL W P-5'-P-CCNC: 36 U/L (ref 0–45)
BACTERIA (RMG): ABNORMAL
BILIRUB SERPL-MCNC: 0.6 MG/DL
BILIRUBIN (RMG): NEGATIVE
BLOOD (RMG): ABNORMAL
BUN SERPL-MCNC: 13.4 MG/DL (ref 8–23)
CALCIUM SERPL-MCNC: 8.9 MG/DL (ref 8.8–10.2)
CASTS (RMG): ABNORMAL
CHLORIDE SERPL-SCNC: 107 MMOL/L (ref 98–107)
COHGB MFR BLD: 1.3 % (ref 0–2)
COLOR UR: YELLOW
CREAT SERPL-MCNC: 0.75 MG/DL (ref 0.51–0.95)
CRYSTAL (RMG): ABNORMAL
DEPRECATED HCO3 PLAS-SCNC: 26 MMOL/L (ref 22–29)
EGFRCR SERPLBLD CKD-EPI 2021: 84 ML/MIN/1.73M2
EPITHELIAL (RMG): ABNORMAL
GLUCOSE (RMG): NEGATIVE
GLUCOSE SERPL-MCNC: 106 MG/DL (ref 70–99)
KETONE (RMG): NEGATIVE
LEUKOCYTE (RMG): NEGATIVE
MUCOUS (RMG): ABNORMAL
NITRITE (RMG): NEGATIVE
PH UR STRIP: 7 PH (ref 5–7)
POTASSIUM SERPL-SCNC: 3.6 MMOL/L (ref 3.4–5.3)
PROT SERPL-MCNC: 6.4 G/DL (ref 6.4–8.3)
PROTEIN (RMG): NEGATIVE
RBC (RMG): ABNORMAL
SODIUM SERPL-SCNC: 142 MMOL/L (ref 135–145)
SP GR UR STRIP: 1.02
UROBILINOGEN (RMG): 0.2
WBC (RMG): ABNORMAL

## 2024-01-30 PROCEDURE — 36415 COLL VENOUS BLD VENIPUNCTURE: CPT

## 2024-01-30 PROCEDURE — 84155 ASSAY OF PROTEIN SERUM: CPT

## 2024-01-30 PROCEDURE — 82040 ASSAY OF SERUM ALBUMIN: CPT

## 2024-01-30 PROCEDURE — 81003 URINALYSIS AUTO W/O SCOPE: CPT | Mod: QW | Performed by: FAMILY MEDICINE

## 2024-01-30 PROCEDURE — 99214 OFFICE O/P EST MOD 30 MIN: CPT | Mod: 25 | Performed by: FAMILY MEDICINE

## 2024-01-30 PROCEDURE — G0439 PPPS, SUBSEQ VISIT: HCPCS | Performed by: FAMILY MEDICINE

## 2024-01-30 PROCEDURE — 82375 ASSAY CARBOXYHB QUANT: CPT

## 2024-01-30 NOTE — PROGRESS NOTES
"SUBJECTIVE:   Giovana Key is a 73 year old female who presents for Preventive Visit.     Patient has been advised of split billing requirements and indicates understanding: Yes  Are you in the first 12 months of your Medicare Part B coverage?  No    Dysphagia and history of schatski's ring.  s/p dilation     Graves disease: now hypothyroid on replacement.  Followed by Dr Ibarra.     PSVT: PAT and WPW: followed by cardiology.  Asymptomatic.      Worried abou possible carbon monoxide testing due to leak at home.  Would like testing.      Right low back/side pain.  Intermittent but persistent.  Had CT recently from [facility] without obvious cause.    Bladder wall thickening: mild on CT.  Minimal symptoms.    Physical Health:  In general, how would you rate your overall physical health? fair  Outside of work, how many days during the week do you exercise? none  Outside of work, approximately how many minutes a day do you exercise?not applicable  If you drink alcohol do you typically have >3 drinks per day or >7 drinks per week? No  Do you usually eat at least 4 servings of fruit and vegetables a day, include whole grains & fiber and avoid regularly eating high fat or \"junk\" foods? Yes  Do you have any problems taking medications regularly?  No  Do you have any side effects from medications? none  Needs assistance for the following daily activities: no assistance needed  Which of the following safety concerns are present in your home?  none identified   Hearing impairment: No  In the past 6 months, have you been bothered by leaking of urine? yes    Have you had an eye exam in the past two years? Yes   Do you see a dentist twice per year? Yes   What is your current smoking status? Quit  Do you use smokeless tobacco? No  Abuse: Current or Past(Physical, Sexual or Emotional)- No     Hearing Screening:  Right Ear  4000Hz: pass  2000Hz: pass  1000Hz: pass  500Hz: pass    Left Ear  4000Hz: pass  2000Hz: pass  1000Hz: pass  500Hz: " pass     Mental Health:  In general, how would you rate your overall mental or emotional health? fair  PHQ-2 Score:      Do you feel safe in your environment? Yes    Have you ever done Advance Care Planning? (For example, a Health Directive, POLST, or a discussion with a medical provider or your loved ones about your wishes): No, advance care planning information given to patient to review.  Patient plans to discuss their wishes with loved ones or provider.      Additional concerns to address?  YES    Fall risk:  Fallen 2 or more times in the past year?: No  Any fall with injury in the past year?: No    Cognitive Screenin) Repeat 3 items (Leader, Season, Table)    2) Clock draw: NORMAL  3) 3 item recall: Recalls 3 objects  Results: 3 items recalled: COGNITIVE IMPAIRMENT LESS LIKELY  Mini-CogTM Copyright S Luis. Licensed by the author for use in [facility]; reprinted with permission (lisa@[facility]). All rights reserved.      Do you have sleep apnea, excessive snoring or daytime drowsiness? : unsure        -------------------------------------    Reviewed and updated as needed this visit by clinical staff   Tobacco  Allergies  Meds              Reviewed and updated as needed this visit by Provider                  Social History     Tobacco Use    Smoking status: Former     Types: Cigarettes     Quit date: 1983     Years since quittin.1    Smokeless tobacco: Never   Substance Use Topics    Alcohol use: Yes     Alcohol/week: 0.0 standard drinks of alcohol     Comment: 1 drink nightly              No data to display                   No data to display              Do you have a current opioid prescription? No  Do you use any other controlled substances or medications that are not prescribed by a provider? None                      Current providers sharing in care for this patient include:   Patient Care Team:  Gato Vincent MD as PCP - General (Family Practice)  Ching  "MD Anju as Assigned PCP    The following health maintenance items are reviewed in Epic and correct as of today:  Health Maintenance   Topic Date Due    DTAP/TDAP/TD IMMUNIZATION (2 - Td or Tdap) 04/07/2021    MAMMO SCREENING  02/09/2023    LIPID  04/04/2023    COVID-19 Vaccine (6 - 2023-24 season) 09/01/2023    TSH W/FREE T4 REFLEX  08/30/2024    MEDICARE ANNUAL WELLNESS VISIT  01/30/2025    FALL RISK ASSESSMENT  01/30/2025    COLORECTAL CANCER SCREENING  01/17/2027    ADVANCE CARE PLANNING  01/30/2029    DEXA  07/21/2038    HEPATITIS C SCREENING  Completed    MIGRAINE ACTION PLAN  Completed    PHQ-2 (once per calendar year)  Completed    INFLUENZA VACCINE  Completed    Pneumococcal Vaccine: 65+ Years  Completed    ZOSTER IMMUNIZATION  Completed    RSV VACCINE (Pregnancy & 60+)  Completed    IPV IMMUNIZATION  Aged Out    HPV IMMUNIZATION  Aged Out    MENINGITIS IMMUNIZATION  Aged Out    RSV MONOCLONAL ANTIBODY  Aged Out     Lab work is in process      ROS:  Constitutional, HEENT, cardiovascular, pulmonary, GI, , musculoskeletal, neuro, skin, endocrine and psych systems are negative, except as otherwise noted.    OBJECTIVE:   BP 98/73   Pulse 54   Ht 1.6 m (5' 3\")   Wt 73.2 kg (161 lb 6.4 oz)   SpO2 97%   BMI 28.59 kg/m   Estimated body mass index is 28.59 kg/m  as calculated from the following:    Height as of this encounter: 1.6 m (5' 3\").    Weight as of this encounter: 73.2 kg (161 lb 6.4 oz).  EXAM:   GENERAL APPEARANCE: healthy, alert and no distress  EYES: Eyes grossly normal to inspection, PERRL and conjunctivae and sclerae normal  HENT: ear canals and TM's normal, nose and mouth without ulcers or lesions, oropharynx clear and oral mucous membranes moist  NECK: no adenopathy, no asymmetry, masses, or scars and thyroid normal to palpation  RESP: lungs clear to auscultation - no rales, rhonchi or wheezes  CV: regular rate and rhythm, normal S1 S2, no S3 or S4, no murmur, click or rub, no peripheral " edema and peripheral pulses strong  ABDOMEN: soft, nontender, no hepatosplenomegaly, no masses and bowel sounds normal  MS: no musculoskeletal defects are noted and gait is age appropriate without ataxia  SKIN: no suspicious lesions or rashes  NEURO: Normal strength and tone, sensory exam grossly normal, mentation intact and speech normal  PSYCH: mentation appears normal and affect normal/bright    Diagnostic Test Results:  Labs reviewed in Epic  No results found for this or any previous visit (from the past 24 hour(s)).    ASSESSMENT / PLAN:   Encounter for Medicare annual wellness exam  - discussed preventative guidelines, healthy diet, exercise and weight management    Bladder wall thickening  Mild, uncertain symptoms.  Check UA.  If symptoms increase or UA has significant abnormalities can refer to urology  - Urinalysis ([facility])    Chronic right-sided low back pain with left-sided sciatica  Likely muscular in light of recent CT.  Trial of PT  - Physical Therapy Referral; Future    Paroxysmal supraventricular tachycardia  Asymptomatic, currently not on medication.  Follow up with cardiology prn    Rand-Parkinson-White (WPW) syndrome  See above    Paroxysmal atrial tachycardia  See above    Graves disease  Stable, followed by endocrinology, on levothyroxine    Other specified hypothyroidism  See above    Encounter for screening for cardiovascular disorders    - VENOUS COLLECTION  - Lipid Profile; Future    Encounter for screening for metabolic disorder    - Comprehensive metabolic panel; Future    Carbon monoxide exposure  Requests that her level be checked  - Carbon monoxide; Future      Patient has been advised of split billing requirements and indicates understanding: Yes    COUNSELING:  Reviewed preventive health counseling, as reflected in patient instructions       Regular exercise       Healthy diet/nutrition       Osteoporosis prevention/bone health    Estimated body mass index is 28.59 kg/m  as calculated  "from the following:    Height as of this encounter: 1.6 m (5' 3\").    Weight as of this encounter: 73.2 kg (161 lb 6.4 oz).        She reports that she quit smoking about 41 years ago. Her smoking use included cigarettes. She has never used smokeless tobacco.    I have reviewed Opioid Use Disorder and Substance Use Disorder risk factors and made any needed referrals.   Appropriate preventive services were discussed with this patient, including applicable screening as appropriate for cardiovascular disease, diabetes, osteopenia/osteoporosis, and glaucoma.  As appropriate for age/gender, discussed screening for colorectal cancer, prostate cancer, breast cancer, and cervical cancer. Checklist reviewing preventive services available has been given to the patient.    Reviewed patients plan of care and provided an AVS. The Basic Care Plan (routine screening as documented in Health Maintenance) for Giovana meets the Care Plan requirement. This Care Plan has been established and reviewed with the Patient.    Counseling Resources:  ATP IV Guidelines  Pooled Cohorts Equation Calculator  Breast Cancer Risk Calculator  BRCA-Related Cancer Risk Assessment: FHS-7 Tool  FRAX Risk Assessment  ICSI Preventive Guidelines  Dietary Guidelines for Americans, 2010  USDA's MyPlate  ASA Prophylaxis  Lung CA Screening    Gato Vincent MD  [facility]  "

## 2024-01-30 NOTE — PATIENT INSTRUCTIONS
Patient Education   Personalized Prevention Plan  You are due for the preventive services outlined below.  Your care team is available to assist you in scheduling these services.  If you have already completed any of these items, please share that information with your care team to update in your medical record.  Health Maintenance Due   Topic Date Due     RSV VACCINE (Pregnancy & 60+) (1 - 1-dose 60+ series) Never done     Diptheria Tetanus Pertussis (DTAP/TDAP/TD) Vaccine (2 - Td or Tdap) 04/07/2021     Mammogram  02/09/2023     Cholesterol Lab  04/04/2023     COVID-19 Vaccine (6 - 2023-24 season) 09/01/2023     PHQ-2 (once per calendar year)  01/01/2024

## 2024-02-01 ENCOUNTER — TELEPHONE (OUTPATIENT)
Dept: FAMILY MEDICINE | Facility: CLINIC | Age: 74
End: 2024-02-01

## 2024-02-01 DIAGNOSIS — N32.89 BLADDER WALL THICKENING: Primary | ICD-10-CM

## 2024-02-01 DIAGNOSIS — G43.909 MIGRAINE WITHOUT STATUS MIGRAINOSUS, NOT INTRACTABLE, UNSPECIFIED MIGRAINE TYPE: ICD-10-CM

## 2024-02-01 NOTE — TELEPHONE ENCOUNTER
----- Message from Gato Vincent MD sent at 1/30/2024  2:29 PM CST -----  Please call patient with results:    Giovana, your urine test shows a very minimal microscopic amount of blood.  This is quite common.  However, given bladder wall thickening on your CT and presence of microscopic blood in he past, I think referral to urology to further evaluate is reasonable.    Gato Vincent MD

## 2024-02-01 NOTE — TELEPHONE ENCOUNTER
Left message for patient to call clinic back regarding urinalysis results.  Etelvina Bourgeois LPN on 2/1/2024 at 1:34 PM

## 2024-02-02 NOTE — CONFIDENTIAL NOTE
Med: RELPAX    LOV (related): 1/30/24 - CPX      Due for F/U around: 1/2025 FOR CPX    Next Appt: NONE

## 2024-02-02 NOTE — TELEPHONE ENCOUNTER
Spoke with patient regarding urinalysis results and Dr. Vicnent's recommendation to see urology.  Patient agrees with plan, referral faxed to [facility] and she will await their call to schedule.  Patient thanked this writer, no further questions or concerns.  Etelvina Bourgeois LPN on 2/2/2024 at 8:45 AM

## 2024-02-04 RX ORDER — ELETRIPTAN HYDROBROMIDE 20 MG/1
TABLET, FILM COATED ORAL
Qty: 36 TABLET | Refills: 0 | Status: SHIPPED | OUTPATIENT
Start: 2024-02-04 | End: 2024-04-30

## 2024-02-11 ENCOUNTER — HOSPITAL ENCOUNTER (EMERGENCY)
Facility: CLINIC | Age: 74
Discharge: HOME | End: 2024-02-11
Attending: EMERGENCY MEDICINE | Admitting: EMERGENCY MEDICINE
Payer: COMMERCIAL

## 2024-02-11 ENCOUNTER — APPOINTMENT (OUTPATIENT)
Dept: CT IMAGING | Facility: CLINIC | Age: 74
End: 2024-02-11
Attending: EMERGENCY MEDICINE
Payer: COMMERCIAL

## 2024-02-11 VITALS
TEMPERATURE: 97 F | RESPIRATION RATE: 16 BRPM | DIASTOLIC BLOOD PRESSURE: 76 MMHG | SYSTOLIC BLOOD PRESSURE: 169 MMHG | OXYGEN SATURATION: 99 % | HEART RATE: 58 BPM

## 2024-02-11 DIAGNOSIS — R10.9 RIGHT FLANK PAIN: ICD-10-CM

## 2024-02-11 DIAGNOSIS — R31.29 MICROSCOPIC HEMATURIA: ICD-10-CM

## 2024-02-11 LAB
ALBUMIN SERPL BCG-MCNC: 4.2 G/DL (ref 3.5–5.2)
ALBUMIN UR-MCNC: NEGATIVE MG/DL
ALP SERPL-CCNC: 59 U/L (ref 40–150)
ALT SERPL W P-5'-P-CCNC: 18 U/L (ref 0–50)
ANION GAP SERPL CALCULATED.3IONS-SCNC: 10 MMOL/L (ref 7–15)
APPEARANCE UR: CLEAR
AST SERPL W P-5'-P-CCNC: 18 U/L (ref 0–45)
BASOPHILS # BLD AUTO: 0 10E3/UL (ref 0–0.2)
BASOPHILS NFR BLD AUTO: 1 %
BILIRUB SERPL-MCNC: 0.7 MG/DL
BILIRUB UR QL STRIP: NEGATIVE
BUN SERPL-MCNC: 10.8 MG/DL (ref 8–23)
CALCIUM SERPL-MCNC: 9.1 MG/DL (ref 8.8–10.2)
CHLORIDE SERPL-SCNC: 107 MMOL/L (ref 98–107)
COLOR UR AUTO: YELLOW
CREAT SERPL-MCNC: 0.75 MG/DL (ref 0.51–0.95)
DEPRECATED HCO3 PLAS-SCNC: 27 MMOL/L (ref 22–29)
EGFRCR SERPLBLD CKD-EPI 2021: 84 ML/MIN/1.73M2
EOSINOPHIL # BLD AUTO: 0.1 10E3/UL (ref 0–0.7)
EOSINOPHIL NFR BLD AUTO: 2 %
ERYTHROCYTE [DISTWIDTH] IN BLOOD BY AUTOMATED COUNT: 12.2 % (ref 10–15)
GLUCOSE SERPL-MCNC: 99 MG/DL (ref 70–99)
GLUCOSE UR STRIP-MCNC: NEGATIVE MG/DL
HCT VFR BLD AUTO: 40.7 % (ref 35–47)
HGB BLD-MCNC: 13.8 G/DL (ref 11.7–15.7)
HGB UR QL STRIP: ABNORMAL
IMM GRANULOCYTES # BLD: 0 10E3/UL
IMM GRANULOCYTES NFR BLD: 0 %
KETONES UR STRIP-MCNC: NEGATIVE MG/DL
LEUKOCYTE ESTERASE UR QL STRIP: NEGATIVE
LIPASE SERPL-CCNC: 25 U/L (ref 13–60)
LYMPHOCYTES # BLD AUTO: 1.8 10E3/UL (ref 0.8–5.3)
LYMPHOCYTES NFR BLD AUTO: 34 %
MCH RBC QN AUTO: 32.9 PG (ref 26.5–33)
MCHC RBC AUTO-ENTMCNC: 33.9 G/DL (ref 31.5–36.5)
MCV RBC AUTO: 97 FL (ref 78–100)
MONOCYTES # BLD AUTO: 0.4 10E3/UL (ref 0–1.3)
MONOCYTES NFR BLD AUTO: 7 %
MUCOUS THREADS #/AREA URNS LPF: PRESENT /LPF
NEUTROPHILS # BLD AUTO: 3.1 10E3/UL (ref 1.6–8.3)
NEUTROPHILS NFR BLD AUTO: 56 %
NITRATE UR QL: NEGATIVE
NRBC # BLD AUTO: 0 10E3/UL
NRBC BLD AUTO-RTO: 0 /100
PH UR STRIP: 5.5 [PH] (ref 5–7)
PLATELET # BLD AUTO: 194 10E3/UL (ref 150–450)
POTASSIUM SERPL-SCNC: 3.7 MMOL/L (ref 3.4–5.3)
PROT SERPL-MCNC: 6.7 G/DL (ref 6.4–8.3)
RBC # BLD AUTO: 4.2 10E6/UL (ref 3.8–5.2)
RBC URINE: 2 /HPF
SODIUM SERPL-SCNC: 144 MMOL/L (ref 135–145)
SP GR UR STRIP: 1.02 (ref 1–1.03)
UROBILINOGEN UR STRIP-MCNC: NORMAL MG/DL
WBC # BLD AUTO: 5.4 10E3/UL (ref 4–11)
WBC URINE: 1 /HPF

## 2024-02-11 PROCEDURE — 83690 ASSAY OF LIPASE: CPT | Performed by: EMERGENCY MEDICINE

## 2024-02-11 PROCEDURE — 99284 EMERGENCY DEPT VISIT MOD MDM: CPT | Mod: 25

## 2024-02-11 PROCEDURE — 81001 URINALYSIS AUTO W/SCOPE: CPT | Performed by: EMERGENCY MEDICINE

## 2024-02-11 PROCEDURE — 36415 COLL VENOUS BLD VENIPUNCTURE: CPT | Performed by: EMERGENCY MEDICINE

## 2024-02-11 PROCEDURE — 80053 COMPREHEN METABOLIC PANEL: CPT | Performed by: EMERGENCY MEDICINE

## 2024-02-11 PROCEDURE — 85004 AUTOMATED DIFF WBC COUNT: CPT | Performed by: EMERGENCY MEDICINE

## 2024-02-11 PROCEDURE — 74176 CT ABD & PELVIS W/O CONTRAST: CPT

## 2024-02-11 RX ORDER — DIAZEPAM 5 MG
5 TABLET ORAL EVERY 6 HOURS PRN
Qty: 20 TABLET | Refills: 0 | Status: SHIPPED | OUTPATIENT
Start: 2024-02-11 | End: 2024-02-18

## 2024-02-11 ASSESSMENT — ACTIVITIES OF DAILY LIVING (ADL)
ADLS_ACUITY_SCORE: 33
ADLS_ACUITY_SCORE: 35

## 2024-02-11 NOTE — ED PROVIDER NOTES
History     Chief Complaint:  Abdominal Pain       HPI   Giovana Key is a 73 year old female with a history of diverticulitis of colon. GERD, renal diease and thyroid diease, presents to the emergency room with abdominal pain/ the patient reports that she started to have right abdominal pain last night around 1700. She states that she was unable to get up from her chair because of the pain. She did srinivas doctor who told her that it was not musculoskeletal. She adds that it is hard for her to breath. Her pain does not radiate. She denies nausea and vomiting. She does not have any issues urinating but a few months ago she gave a urin sample that did have blood in it.       Independent Historian:    None spoke with patient's mother in room with her    Review of External Notes:  None    Medications:    1000 UT   Colace   Relpax   Synthroid   Cytomel   Progesterone   Prilosec     Past Medical History:    Acute gastric volvulus   Common bile duct calculus   Decreases libido   Diverticulitis of colon   GERD   Graves diease   Peroneal tendonitis   PONV   Renal disease   Thyroid disease   WPW syndrome     Past Surgical History:    Cholecystectomy   Breast Augmentation   Tonsil ectomy   Tubal ligation   Hysterectomy      Physical Exam   Patient Vitals for the past 24 hrs:   BP Temp Temp src Pulse Resp SpO2   02/11/24 1337 (!) 169/76 -- -- -- 16 99 %   02/11/24 1254 (!) 171/67 -- -- -- -- --   02/11/24 1253 -- 97  F (36.1  C) Temporal 58 16 98 %        Physical Exam  Constitutional: middle female supine no respitory distress  HENT: No signs of trauma.   Eyes: EOM are normal. Pupils are equal, round, and reactive to light.   Neck: Normal range of motion. No JVD present. No cervical adenopathy.  Cardiovascular: Regular rhythm.  Exam reveals no gallop and no friction rub.    No murmur heard.  Pulmonary/Chest: Bilateral breath sounds normal. No wheezes, rhonchi or rales.  Abdominal: Soft. No tenderness. No rebound or guarding.  No CVA tenderness. Costovertebral angle.  Musculoskeletal: No edema. No tenderness.   Lymphadenopathy: No lymphadenopathy.   Neurological: Alert and oriented to person, place, and time. Normal strength. Coordination normal.   Skin: Skin is warm and dry. No rash noted. No erythema.      Emergency Department Course       Imaging:  CT Abdomen Pelvis without Contrast (stone protocol)   Final Result   IMPRESSION:    1.  No abnormalities are seen to explain symptoms.   2.  Specifically no renal calculi, renal or bowel obstruction. No inflammatory changes or ascites.   3.  Quite redundant colon with extensive, distal colonic diverticulosis. No evidence of diverticulitis.   4.  Appendix is normal.    5.  Prior hysterectomy cholecystectomy.           Report per radiologist    Laboratory:  Labs Ordered and Resulted from Time of ED Arrival to Time of ED Departure   ROUTINE UA WITH MICROSCOPIC REFLEX TO CULTURE - Abnormal       Result Value    Color Urine Yellow      Appearance Urine Clear      Glucose Urine Negative      Bilirubin Urine Negative      Ketones Urine Negative      Specific Gravity Urine 1.023      Blood Urine Trace (*)     pH Urine 5.5      Protein Albumin Urine Negative      Urobilinogen Urine Normal      Nitrite Urine Negative      Leukocyte Esterase Urine Negative      Mucus Urine Present (*)     RBC Urine 2      WBC Urine 1     COMPREHENSIVE METABOLIC PANEL - Normal    Sodium 144      Potassium 3.7      Carbon Dioxide (CO2) 27      Anion Gap 10      Urea Nitrogen 10.8      Creatinine 0.75      GFR Estimate 84      Calcium 9.1      Chloride 107      Glucose 99      Alkaline Phosphatase 59      AST 18      ALT 18      Protein Total 6.7      Albumin 4.2      Bilirubin Total 0.7     LIPASE - Normal    Lipase 25     CBC WITH PLATELETS AND DIFFERENTIAL    WBC Count 5.4      RBC Count 4.20      Hemoglobin 13.8      Hematocrit 40.7      MCV 97      MCH 32.9      MCHC 33.9      RDW 12.2      Platelet Count 194      %  Neutrophils 56      % Lymphocytes 34      % Monocytes 7      % Eosinophils 2      % Basophils 1      % Immature Granulocytes 0      NRBCs per 100 WBC 0      Absolute Neutrophils 3.1      Absolute Lymphocytes 1.8      Absolute Monocytes 0.4      Absolute Eosinophils 0.1      Absolute Basophils 0.0      Absolute Immature Granulocytes 0.0      Absolute NRBCs 0.0          Procedures   None    Emergency Department Course & Assessments:         Interventions:  Medications - No data to display     Assessments:  1300 I obtained history and examined the patient as noted above.     Independent Interpretation (X-rays, CTs, rhythm strip):  None    Consultations/Discussion of Management or Tests:  None       Social Determinants of Health affecting care:  None     Disposition:  Discharge    Impression & Plan        Medical Decision Making:  Patient presents with her 93-year-old mom to the ED because of her own right flank pain she has noted some for the past 2 weeks but it was particularly worse this morning.  She called her primary who said to go to the ER.  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  She has no history of kidney stones.  Her workup here is relatively unremarkable CT is negative blood is negative urine shows microscopic hematuria which has been present in the past.  Patient has never followed up with urology for this and I have told her she needs to discuss this with her primary and I did recommend urology follow-up for release cystoscopy to rule out bladder pathology.  As for her flank pain this could well be musculoskeletal and I have recommended cold compresses ibuprofen and will give her Valium to use for severe spasm.  She should also follow-up with her primary to determine if physical therapy might be indicated.  If symptoms change or worsen recheck in the ED.        Diagnosis:    ICD-10-CM     1. Right flank pain  R10.9       2. Microscopic hematuria  R31.29            Discharge Medications:  Discharge Medication List as of 2/11/2024  3:02 PM        START taking these medications    Details   diazepam (VALIUM) 5 MG tablet Take 1 tablet (5 mg) by mouth every 6 hours as needed for anxiety (MUSCLE SPASM), Disp-20 tablet, R-0, Local Print                Scribe Disclosure:  I, Mansi Benitez, am serving as a scribe at 1:28 PM on 2/11/2024 to document services personally performed by Charbel Cerna MD based on my observations and the provider's statements to me.  2/11/2024   Charbel Cerna MD Steinman, Randall Ira, MD  02/11/24 [id number]

## 2024-02-20 ENCOUNTER — OFFICE VISIT (OUTPATIENT)
Dept: FAMILY MEDICINE | Facility: CLINIC | Age: 74
End: 2024-02-20

## 2024-02-20 VITALS
SYSTOLIC BLOOD PRESSURE: 136 MMHG | HEART RATE: 66 BPM | DIASTOLIC BLOOD PRESSURE: 47 MMHG | WEIGHT: 162.8 LBS | OXYGEN SATURATION: 97 % | BODY MASS INDEX: 28.84 KG/M2

## 2024-02-20 DIAGNOSIS — M54.50 CHRONIC RIGHT-SIDED LOW BACK PAIN WITHOUT SCIATICA: Primary | ICD-10-CM

## 2024-02-20 DIAGNOSIS — G89.29 CHRONIC RIGHT-SIDED LOW BACK PAIN WITHOUT SCIATICA: Primary | ICD-10-CM

## 2024-02-20 DIAGNOSIS — R31.29 MICROHEMATURIA: ICD-10-CM

## 2024-02-20 DIAGNOSIS — I47.10 PAROXYSMAL SUPRAVENTRICULAR TACHYCARDIA (H): ICD-10-CM

## 2024-02-20 PROCEDURE — 99214 OFFICE O/P EST MOD 30 MIN: CPT | Performed by: FAMILY MEDICINE

## 2024-02-20 RX ORDER — NAPROXEN 500 MG/1
500 TABLET ORAL 2 TIMES DAILY WITH MEALS
Qty: 20 TABLET | Refills: 0 | Status: SHIPPED | OUTPATIENT
Start: 2024-02-20 | End: 2024-05-23

## 2024-02-20 RX ORDER — DIAZEPAM 2 MG
2 TABLET ORAL EVERY 12 HOURS PRN
Qty: 10 TABLET | Refills: 0 | Status: SHIPPED | OUTPATIENT
Start: 2024-02-20

## 2024-02-20 NOTE — PATIENT INSTRUCTIONS
Referred By  Referred To   Gato Vincent MD   [address]   [facility] [address]   Phone: [phone]   Fax: [phone]    Diagnoses: Chronic right-sided low back pain with left-sided sciatica   Order: Physical Therapy Referral       Comment: Please be aware that coverage of these services is subject to the terms and limitations of your health insurance plan.  Call member services at your health plan with any benefit or coverage questions.   If you have not heard from the scheduling office within 2 business days, please call [phone] for [employer] [place], [phone] for [employer] and [phone] for [facility].

## 2024-02-20 NOTE — PROGRESS NOTES
Gia Akhtar is a 73 year old patient who presents to clinic for ER follow up.    She has had chronic right flank pain.  A CT was performed again and was normal.  She has trace blood in her dipstick but no RBC under microscopy.  She has been referred to urology and has an appointment in march.  The pain is intermittent but very severe.  She states if there was a fire during a pain attack she would be unable to escape.      Other chronic issues are stable:     Dysphagia and history of schatski's ring.  Possible dilation planned.     Graves disease: now hypothyroid on replacement.       PAT and WPW: followed by cardiology.  Asymptomatic.      Review of Systems   Constitutional, HEENT, cardiovascular, pulmonary, GI, , musculoskeletal, neuro, skin, endocrine and psych systems are negative, except as otherwise noted.      Objective    /47   Pulse 66   Wt 73.8 kg (162 lb 12.8 oz)   SpO2 97%   BMI 28.84 kg/m      General: Well appearing, NAD  Psych: normal mood and affect        No results found for this or any previous visit (from the past 24 hour(s)).    Chronic right-sided low back pain without sciatica  Requests valium to use sparingly and pain medication.  Proper use and potential benefits, harms and side effects discussed in detail.    - diazepam (VALIUM) 2 MG tablet; Take 1 tablet (2 mg) by mouth every 12 hours as needed for anxiety or muscle spasms  - naproxen (NAPROSYN) 500 MG tablet; Take 1 tablet (500 mg) by mouth 2 times daily (with meals)    Microhematuria  Urology appt scheduled  - Adult [facility] Referral - To a [facility] Location (Use POS/Location); Future    Paroxysmal supraventricular tachycardia  Asymptomatic and not treated currently    Patient is agreement with the assessment and plan as outlined above.  All questions answered.  Red flag symptoms that should prompt emergent evaluation discussed and understood.

## 2024-03-04 ENCOUNTER — TRANSFERRED RECORDS (OUTPATIENT)
Dept: FAMILY MEDICINE | Facility: CLINIC | Age: 74
End: 2024-03-04

## 2024-04-30 DIAGNOSIS — G43.909 MIGRAINE WITHOUT STATUS MIGRAINOSUS, NOT INTRACTABLE, UNSPECIFIED MIGRAINE TYPE: ICD-10-CM

## 2024-04-30 NOTE — TELEPHONE ENCOUNTER
Med: eletriptan (RELPAX) 20 MG tablet     LOV (related): 1/30/24 AWV      Due for F/U around: Jan 2025    Next Appt: none

## 2024-05-01 RX ORDER — ELETRIPTAN HYDROBROMIDE 20 MG/1
TABLET, FILM COATED ORAL
Qty: 36 TABLET | Refills: 0 | Status: SHIPPED | OUTPATIENT
Start: 2024-05-01

## 2024-05-07 ENCOUNTER — TELEPHONE (OUTPATIENT)
Dept: FAMILY MEDICINE | Facility: CLINIC | Age: 74
End: 2024-05-07

## 2024-05-07 NOTE — TELEPHONE ENCOUNTER
Prior authorization done via CoverMyMeds for eletriptan. Prior authorization approved with approval dates 2/4/24-5/6/25. Patient notified of approval. Tammie Harvey

## 2024-05-20 DIAGNOSIS — M54.50 CHRONIC RIGHT-SIDED LOW BACK PAIN WITHOUT SCIATICA: ICD-10-CM

## 2024-05-20 DIAGNOSIS — G89.29 CHRONIC RIGHT-SIDED LOW BACK PAIN WITHOUT SCIATICA: ICD-10-CM

## 2024-05-20 NOTE — TELEPHONE ENCOUNTER
Med: Naproxen     LOV (related): 2/20/24      Due for F/U around: 1 year for CPX      Next Appt: No current future appointments scheduled

## 2024-05-23 RX ORDER — NAPROXEN 500 MG/1
500 TABLET ORAL 2 TIMES DAILY WITH MEALS
Qty: 20 TABLET | Refills: 0 | Status: SHIPPED | OUTPATIENT
Start: 2024-05-23

## 2024-10-02 ENCOUNTER — MEDICAL CORRESPONDENCE (OUTPATIENT)
Dept: FAMILY MEDICINE | Facility: CLINIC | Age: 74
End: 2024-10-02

## 2024-10-25 ENCOUNTER — TRANSFERRED RECORDS (OUTPATIENT)
Dept: FAMILY MEDICINE | Facility: CLINIC | Age: 74
End: 2024-10-25

## 2024-10-29 ENCOUNTER — ANCILLARY PROCEDURE (OUTPATIENT)
Dept: MAMMOGRAPHY | Facility: CLINIC | Age: 74
End: 2024-10-29
Attending: FAMILY MEDICINE
Payer: COMMERCIAL

## 2024-10-29 DIAGNOSIS — Z12.31 VISIT FOR SCREENING MAMMOGRAM: ICD-10-CM

## 2024-10-29 PROCEDURE — 77063 BREAST TOMOSYNTHESIS BI: CPT | Mod: TC | Performed by: RADIOLOGY

## 2024-10-29 PROCEDURE — 77067 SCR MAMMO BI INCL CAD: CPT | Mod: TC | Performed by: RADIOLOGY

## 2025-02-24 DIAGNOSIS — T78.40XD ALLERGIC REACTION, SUBSEQUENT ENCOUNTER: ICD-10-CM

## 2025-02-25 RX ORDER — EPINEPHRINE 0.3 MG/.3ML
0.3 INJECTION SUBCUTANEOUS PRN
Qty: 2 EACH | Refills: 3 | Status: SHIPPED | OUTPATIENT
Start: 2025-02-25

## 2025-02-25 NOTE — CONFIDENTIAL NOTE
Med: EPINEPHRINE    LOV (related): 1/30/24 - CPX      Due for F/U around: 1/2025 FOR CPX    Next Appt: NONE

## 2025-04-27 DIAGNOSIS — G43.909 MIGRAINE WITHOUT STATUS MIGRAINOSUS, NOT INTRACTABLE, UNSPECIFIED MIGRAINE TYPE: ICD-10-CM

## 2025-04-28 NOTE — TELEPHONE ENCOUNTER
Med: Eletriptan     LOV (related): 1/30/2024    Due for F/U around: Not specified, overdue for AWV as of 1/30/2025    Next Appt: none scheduled

## 2025-04-29 RX ORDER — ELETRIPTAN HYDROBROMIDE 20 MG/1
TABLET, FILM COATED ORAL
Qty: 36 TABLET | Refills: 0 | Status: SHIPPED | OUTPATIENT
Start: 2025-04-29

## 2025-05-12 ENCOUNTER — OFFICE VISIT (OUTPATIENT)
Dept: FAMILY MEDICINE | Facility: CLINIC | Age: 75
End: 2025-05-12

## 2025-05-12 VITALS
BODY MASS INDEX: 29.02 KG/M2 | WEIGHT: 163.8 LBS | HEIGHT: 63 IN | OXYGEN SATURATION: 96 % | DIASTOLIC BLOOD PRESSURE: 81 MMHG | SYSTOLIC BLOOD PRESSURE: 134 MMHG | HEART RATE: 60 BPM

## 2025-05-12 DIAGNOSIS — G89.29 CHRONIC PAIN OF LEFT KNEE: ICD-10-CM

## 2025-05-12 DIAGNOSIS — M25.562 CHRONIC PAIN OF LEFT KNEE: ICD-10-CM

## 2025-05-12 DIAGNOSIS — H60.502 ACUTE OTITIS EXTERNA OF LEFT EAR, UNSPECIFIED TYPE: Primary | ICD-10-CM

## 2025-05-12 DIAGNOSIS — R09.81 NASAL CONGESTION: ICD-10-CM

## 2025-05-12 PROCEDURE — 3078F DIAST BP <80 MM HG: CPT | Performed by: STUDENT IN AN ORGANIZED HEALTH CARE EDUCATION/TRAINING PROGRAM

## 2025-05-12 PROCEDURE — 99214 OFFICE O/P EST MOD 30 MIN: CPT | Performed by: STUDENT IN AN ORGANIZED HEALTH CARE EDUCATION/TRAINING PROGRAM

## 2025-05-12 PROCEDURE — 3074F SYST BP LT 130 MM HG: CPT | Performed by: STUDENT IN AN ORGANIZED HEALTH CARE EDUCATION/TRAINING PROGRAM

## 2025-05-12 RX ORDER — FLUTICASONE PROPIONATE 50 MCG
1 SPRAY, SUSPENSION (ML) NASAL DAILY
Qty: 9.9 ML | Refills: 0 | Status: SHIPPED | OUTPATIENT
Start: 2025-05-12

## 2025-05-12 RX ORDER — LEVOCETIRIZINE DIHYDROCHLORIDE 5 MG/1
5 TABLET, FILM COATED ORAL EVERY EVENING
Qty: 14 TABLET | Refills: 0 | Status: SHIPPED | OUTPATIENT
Start: 2025-05-12 | End: 2025-05-26

## 2025-05-12 RX ORDER — HYDROCORTISONE AND ACETIC ACID 20.75; 10.375 MG/ML; MG/ML
5 SOLUTION AURICULAR (OTIC) 3 TIMES DAILY
Qty: 10 ML | Refills: 0 | Status: SHIPPED | OUTPATIENT
Start: 2025-05-12 | End: 2025-05-19

## 2025-05-12 ASSESSMENT — ENCOUNTER SYMPTOMS: COUGH: 1

## 2025-05-15 ENCOUNTER — DOCUMENTATION ONLY (OUTPATIENT)
Dept: OTHER | Facility: CLINIC | Age: 75
End: 2025-05-15
Payer: COMMERCIAL

## 2025-05-22 ENCOUNTER — OFFICE VISIT (OUTPATIENT)
Dept: FAMILY MEDICINE | Facility: CLINIC | Age: 75
End: 2025-05-22

## 2025-05-22 VITALS
SYSTOLIC BLOOD PRESSURE: 135 MMHG | OXYGEN SATURATION: 98 % | WEIGHT: 165 LBS | BODY MASS INDEX: 28.86 KG/M2 | DIASTOLIC BLOOD PRESSURE: 50 MMHG | HEART RATE: 60 BPM

## 2025-05-22 DIAGNOSIS — H60.332 CHRONIC SWIMMER'S EAR OF LEFT SIDE: ICD-10-CM

## 2025-05-22 DIAGNOSIS — M72.2 PLANTAR FASCIITIS: ICD-10-CM

## 2025-05-22 DIAGNOSIS — K21.9 GASTROESOPHAGEAL REFLUX DISEASE WITHOUT ESOPHAGITIS: Primary | ICD-10-CM

## 2025-05-22 RX ORDER — FAMOTIDINE 20 MG/1
20 TABLET, FILM COATED ORAL 2 TIMES DAILY
Qty: 90 TABLET | Refills: 0 | Status: SHIPPED | OUTPATIENT
Start: 2025-05-22 | End: 2025-07-06

## 2025-05-22 RX ORDER — CLOTRIMAZOLE 1 G/ML
SOLUTION TOPICAL 2 TIMES DAILY
Qty: 10 ML | Refills: 0 | Status: SHIPPED | OUTPATIENT
Start: 2025-05-22 | End: 2025-06-05

## 2025-05-22 ASSESSMENT — ENCOUNTER SYMPTOMS
HEARTBURN: 1
COUGH: 1

## 2025-05-22 NOTE — PROGRESS NOTES
"Answers submitted by the patient for this visit:  General Questionnaire (Submitted on 5/12/2025)  Chief Complaint: Chronic problems general questions HPI Form  How many days per week do you miss taking your medication?: 0  General Concern (Submitted on 5/12/2025)  Chief Complaint: Chronic problems general questions HPI Form  What is the reason for your visit today?: ear left knee  When did your symptoms begin?: More than a month  How would you describe these symptoms?: Severe  Are your symptoms:: Worsening  Have you had these symptoms before?: Yes  Have you tried or received treatment for these symptoms before?: No  Is there anything that makes you feel worse?: no  Questionnaire about: Chronic problems general questions HPI Form (Submitted on 5/12/2025)  Chief Complaint: Chronic problems general questions HPI Form      Assessment & Plan   Assessment & Plan  Gastroesophageal reflux disease without esophagitis  -cough and heartburn symptoms could be related to GERD  -patient would like to avoid PPI given potential long term side effects  -discussed 6 week trial of famotidine to see if she can get symptomatic improvement  Orders:    famotidine (PEPCID) 20 MG tablet; Take 1 tablet (20 mg) by mouth 2 times daily.    Chronic swimmer's ear of left side  -patient continues to have erythematous left ear canal  -previously prescribed acetic acid-hydrocortisone  -given white overlay of TM, will prescribe a fungal ear drop  Orders:    clotrimazole (LOTRIMIN) 1 % external solution; Apply topically 2 times daily for 14 days.    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    Weight as of this " encounter: 74.8 kg (165 lb).     Follow-up   Return in about 2 weeks (around 6/5/2025) for Follow up.    Subjective   Giovana is a 74 year old, presenting for the following health issues:  Ear Problem (Following up on ear. NO longer has pain, but still feels full. ), Musculoskeletal Problem (Left heel a lot of pain when standing up to walk has severe pain. Has been present for 2.5 weeks. ), and Gastrophageal Reflux (Has been having several flare ups recently. )    Ear Problem  Associated symptoms include cough.   Musculoskeletal Problem  Associated symptoms include congestion and coughing.   History of Present Illness       Reason for visit:  Ear left knee  Symptom onset:  More than a month  Symptom intensity:  Severe  Symptom progression:  Worsening  Had these symptoms before:  Yes  Has tried/received treatment for these symptoms:  No  What makes it worse:  No   She is taking medications regularly.        Oral prednisone or ENT?  Ear pain?    Cough?  Nasal congestion? On xyzal and flonase?    Return to orthopedic surgery?    Patient presents as follow up form 5/12 visit with me. At that time she had ha been having pain in her left ear with erythema in the left ear canal and prescribed hydrocortisone-acetic acid drops. Patient also had been experiencing nasal congestion and intermittent cough, it was recommended to try xyzal and flonase to help with symptoms. Finally, patient has been having pain in her left knee, is s/p R TKA, discussed following up with orthopedics about not having FROM of R knee and that affecting left knee.    Patient states that the pain in her left ear is much improved, but she hears crackles and feels a fullness.    Patient states that her cough continues despite Xyzal and Flonase.     Patient states that she has had a flare up of her reflux, but is trying to avoid a PPI given the potential long term side effects.    Patient states that she has been have a severe pain in the heel of the left  foot in the morning and if sitting for a while.    Review of Systems   HENT:  Positive for congestion. Negative for ear pain.         Fullness   Respiratory:  Positive for cough.    Gastrointestinal:  Positive for heartburn.   Musculoskeletal:         Foot pain          Objective    /50 (BP Location: Left arm)   Pulse 60   Wt 74.8 kg (165 lb)   SpO2 98%   BMI 28.86 kg/m    Body mass index is 28.86 kg/m .  Physical Exam  Constitutional:       General: She is not in acute distress.     Appearance: Normal appearance.   HENT:      Head: Normocephalic and atraumatic.      Ears:      Comments: Erythematous left ear canal with white overlay of TM  Eyes:      Conjunctiva/sclera: Conjunctivae normal.   Cardiovascular:      Rate and Rhythm: Normal rate and regular rhythm.      Pulses: Normal pulses.      Heart sounds: Normal heart sounds.   Pulmonary:      Effort: Pulmonary effort is normal. No respiratory distress.      Breath sounds: Normal breath sounds.   Musculoskeletal:        Feet:    Feet:      Comments: Area of tenderness  Skin:     General: Skin is warm and dry.   Neurological:      Mental Status: She is alert.   Psychiatric:         Thought Content: Thought content normal.        Signed Electronically by: Crystal Vasquez DO

## 2025-06-02 ENCOUNTER — OFFICE VISIT (OUTPATIENT)
Dept: FAMILY MEDICINE | Facility: CLINIC | Age: 75
End: 2025-06-02

## 2025-06-02 VITALS
OXYGEN SATURATION: 96 % | HEART RATE: 57 BPM | SYSTOLIC BLOOD PRESSURE: 133 MMHG | BODY MASS INDEX: 28.03 KG/M2 | DIASTOLIC BLOOD PRESSURE: 79 MMHG | WEIGHT: 164.2 LBS | HEIGHT: 64 IN

## 2025-06-02 DIAGNOSIS — R07.0 THROAT DISCOMFORT: ICD-10-CM

## 2025-06-02 DIAGNOSIS — H92.02 LEFT EAR PAIN: Primary | ICD-10-CM

## 2025-06-02 PROCEDURE — 3075F SYST BP GE 130 - 139MM HG: CPT | Performed by: STUDENT IN AN ORGANIZED HEALTH CARE EDUCATION/TRAINING PROGRAM

## 2025-06-02 PROCEDURE — 3078F DIAST BP <80 MM HG: CPT | Performed by: STUDENT IN AN ORGANIZED HEALTH CARE EDUCATION/TRAINING PROGRAM

## 2025-06-02 PROCEDURE — 99214 OFFICE O/P EST MOD 30 MIN: CPT | Performed by: STUDENT IN AN ORGANIZED HEALTH CARE EDUCATION/TRAINING PROGRAM

## 2025-06-02 ASSESSMENT — ENCOUNTER SYMPTOMS: SORE THROAT: 1

## 2025-06-02 NOTE — PROGRESS NOTES
"  Assessment & Plan   Assessment & Plan  Left ear pain  -will complete lotrimin for 14 days  -then given irritation, will return to hydrocortisone-acetic acid  -follow up in 2 weeks to monitor symptoms       Throat discomfort  -will look into causes of yellowish irritation  -could be that something got stuck, cause a wound that is now healing?  -follow up in 2 weeks to monitor lesion  -consider ENT appt if no answer or improvement          BMI  Estimated body mass index is 28.63 kg/m  as calculated from the following:    Height as of this encounter: 1.613 m (5' 3.5\").    Weight as of this encounter: 74.5 kg (164 lb 3.2 oz).     Follow-up   Return in about 2 weeks (around 6/16/2025) for Follow up.    Gia Akhtar is a 74 year old, presenting for the following health issues:  Left Ear (Otitis externa of left ear when seen on 5/12/2025, used hydrocortisone - acetic acid drops three times daily for 7 days; after completing course of treatment she began using lotrimin solution twice daily. Left ear is ; has not completely resolved.) and Consult (Patient saw something abnormal on the right side of her throat, would like this examined. Patient reports that she can feel the lesion and / or abnormality.)    History of Present Illness       Reason for visit:  Throat ear   She is taking medications regularly.      Patient states that her left ear is not back to normal. Patient states that she feels like she was feeling better on hydrocortisone-acetic acid drops.    Patient states that she is feeling an abnormality on the right side of her throat she has noticed a yellow spot with two white dots on it. Patient states that she feels a sensation when swallowing and eating.    Patient states that she feels like the famotidine is starting to help.    Patient states that she doesn't like the fluticasone, but feels like her nose is clearer and it's helping.    Review of Systems   HENT:  Positive for ear pain and sore " "throat.           Objective    /79   Pulse 57   Ht 1.613 m (5' 3.5\")   Wt 74.5 kg (164 lb 3.2 oz)   SpO2 96%   BMI 28.63 kg/m    Body mass index is 28.63 kg/m .  Physical Exam  Constitutional:       General: She is not in acute distress.     Appearance: Normal appearance.   HENT:      Head: Normocephalic and atraumatic.      Right Ear: Tympanic membrane, ear canal and external ear normal. There is no impacted cerumen.      Left Ear: External ear normal. There is no impacted cerumen.      Ears:      Comments: Erythematous and friable looking ear canal left; TM improved, not as white as previous     Mouth/Throat:        Comments: Yellowish area  Eyes:      Conjunctiva/sclera: Conjunctivae normal.   Cardiovascular:      Rate and Rhythm: Normal rate and regular rhythm.      Pulses: Normal pulses.      Heart sounds: Normal heart sounds.   Pulmonary:      Effort: Pulmonary effort is normal. No respiratory distress.      Breath sounds: Normal breath sounds.   Skin:     General: Skin is warm and dry.   Neurological:      Mental Status: She is alert.   Psychiatric:         Thought Content: Thought content normal.        Signed Electronically by: Crystal Vasquez DO    "

## 2025-06-02 NOTE — PATIENT INSTRUCTIONS
Check records, continue lotrimin until completed 14 days of treatment.    After that, take the hydrocortisone-acetic acid drops 3 times a day for 7 days.

## 2025-06-11 ENCOUNTER — OFFICE VISIT (OUTPATIENT)
Dept: FAMILY MEDICINE | Facility: CLINIC | Age: 75
End: 2025-06-11

## 2025-06-11 VITALS
BODY MASS INDEX: 28.67 KG/M2 | OXYGEN SATURATION: 97 % | WEIGHT: 164.4 LBS | DIASTOLIC BLOOD PRESSURE: 49 MMHG | SYSTOLIC BLOOD PRESSURE: 151 MMHG | HEART RATE: 50 BPM

## 2025-06-11 DIAGNOSIS — H65.92 OME (OTITIS MEDIA WITH EFFUSION), LEFT: Primary | ICD-10-CM

## 2025-06-11 PROCEDURE — G2211 COMPLEX E/M VISIT ADD ON: HCPCS | Performed by: FAMILY MEDICINE

## 2025-06-11 PROCEDURE — 3078F DIAST BP <80 MM HG: CPT | Performed by: FAMILY MEDICINE

## 2025-06-11 PROCEDURE — 3077F SYST BP >= 140 MM HG: CPT | Performed by: FAMILY MEDICINE

## 2025-06-11 PROCEDURE — 99213 OFFICE O/P EST LOW 20 MIN: CPT | Performed by: FAMILY MEDICINE

## 2025-06-11 RX ORDER — AMOXICILLIN 500 MG/1
1000 CAPSULE ORAL 2 TIMES DAILY
Qty: 28 CAPSULE | Refills: 0 | Status: SHIPPED | OUTPATIENT
Start: 2025-06-11 | End: 2025-06-18

## 2025-06-11 NOTE — PROGRESS NOTES
Subjective     Giovana is a 74 year old patient who presents to clinic for evaluation.  Has been seen several times lately for throat issues and left AOE.  Was on vosol drops, then lamisil solution and back on vosol.  Mild improvement.  Ear still feels full and uncomfortable.  No fever.    Also noticed whitish yellow spots on right posterior pharynx.  No pain.         Review of Systems   Constitutional, HEENT, cardiovascular, pulmonary, GI, , musculoskeletal, neuro, skin, endocrine and psych systems are negative, except as otherwise noted.      Objective    BP (!) 151/49 (BP Location: Left arm)   Pulse 50   Wt 74.6 kg (164 lb 6.4 oz)   SpO2 97%   BMI 28.67 kg/m      General: Well appearing, NAD  HEENT: right canal and TM normal.  Left TM mild inflammation without drainage.  Left TM opacified with serous effusion but no erythema.  Landmarks not visible.  Oropharynx is clear  Psych: normal mood and affect          OME (otitis media with effusion), left  Given persistent sx and effusion will treat with amox.  Long discussion regarding abx and she feels confident not allergic to amox.  Will call immediately if any hives develop.    Stop gtt for now  Close follow up if not improved  Reassurance that throat appears nromal.  - amoxicillin (AMOXIL) 500 MG capsule; Take 2 capsules (1,000 mg) by mouth 2 times daily for 7 days.          Answers submitted by the patient for this visit:  General Questionnaire (Submitted on 6/2/2025)  Chief Complaint: Chronic problems general questions HPI Form  How many days per week do you miss taking your medication?: 0  General Concern (Submitted on 6/2/2025)  Chief Complaint: Chronic problems general questions HPI Form  What is the reason for your visit today?: throat ear  Questionnaire about: Chronic problems general questions HPI Form (Submitted on 6/2/2025)  Chief Complaint: Chronic problems general questions HPI Form

## 2025-07-23 ENCOUNTER — OFFICE VISIT (OUTPATIENT)
Dept: FAMILY MEDICINE | Facility: CLINIC | Age: 75
End: 2025-07-23

## 2025-07-23 VITALS
SYSTOLIC BLOOD PRESSURE: 146 MMHG | BODY MASS INDEX: 29.38 KG/M2 | HEIGHT: 63 IN | HEART RATE: 55 BPM | DIASTOLIC BLOOD PRESSURE: 52 MMHG | OXYGEN SATURATION: 97 % | WEIGHT: 165.8 LBS

## 2025-07-23 DIAGNOSIS — R03.0 ELEVATED BLOOD PRESSURE READING WITHOUT DIAGNOSIS OF HYPERTENSION: ICD-10-CM

## 2025-07-23 DIAGNOSIS — R74.01 ELEVATED AST (SGOT): Primary | ICD-10-CM

## 2025-07-23 LAB
ALBUMIN SERPL BCG-MCNC: 3.9 G/DL (ref 3.5–5.2)
ALP SERPL-CCNC: 55 U/L (ref 40–150)
ALT SERPL W P-5'-P-CCNC: 16 U/L (ref 0–50)
AST SERPL W P-5'-P-CCNC: 16 U/L (ref 0–45)
BILIRUB DIRECT SERPL-MCNC: 0.12 MG/DL (ref 0–0.3)
BILIRUB SERPL-MCNC: 0.4 MG/DL
PROT SERPL-MCNC: 6.2 G/DL (ref 6.4–8.3)

## 2025-07-23 PROCEDURE — 36415 COLL VENOUS BLD VENIPUNCTURE: CPT | Performed by: FAMILY MEDICINE

## 2025-07-23 PROCEDURE — 80076 HEPATIC FUNCTION PANEL: CPT | Mod: 90 | Performed by: FAMILY MEDICINE

## 2025-07-23 PROCEDURE — 99214 OFFICE O/P EST MOD 30 MIN: CPT | Performed by: FAMILY MEDICINE

## 2025-07-23 PROCEDURE — G2211 COMPLEX E/M VISIT ADD ON: HCPCS | Performed by: FAMILY MEDICINE

## 2025-07-23 PROCEDURE — 3077F SYST BP >= 140 MM HG: CPT | Performed by: FAMILY MEDICINE

## 2025-07-23 PROCEDURE — 3078F DIAST BP <80 MM HG: CPT | Performed by: FAMILY MEDICINE

## 2025-07-23 NOTE — PROGRESS NOTES
"Answers submitted by the patient for this visit:  General Questionnaire (Submitted on 7/23/2025)  Chief Complaint: Chronic problems general questions HPI Form  How many days per week do you miss taking your medication?: 0  General Concern (Submitted on 7/23/2025)  Chief Complaint: Chronic problems general questions HPI Form  What is the reason for your visit today?: liver and wieght  When did your symptoms begin?: More than a month  What are your symptoms?: pressure  How would you describe these symptoms?: Moderate  Are your symptoms:: Staying the same  Have you had these symptoms before?: Yes  Have you tried or received treatment for these symptoms before?: No  Is there anything that makes you feel worse?: bending moving around alot  Questionnaire about: Chronic problems general questions HPI Form (Submitted on 7/23/2025)  Chief Complaint: Chronic problems general questions HPI Form      Subjective     Giovana is a 74 year old patient who presents to clinic for evaluation.  She had a health screen done at home with multiple tests performed.  Her AST level came back mildly elevated around 70.  This was a new abnormality.  She has about one beer per day.  She does not drink excessively.  No other concerns.  No abdominal pain, jaundice, fever, chills, malaise.          Review of Systems   Constitutional, HEENT, cardiovascular, pulmonary, GI, , musculoskeletal, neuro, skin, endocrine and psych systems are negative, except as otherwise noted.      Objective    BP (!) 146/52 (BP Location: Right arm)   Pulse 55   Ht 1.6 m (5' 3\")   Wt 75.2 kg (165 lb 12.8 oz)   SpO2 97%   BMI 29.37 kg/m      General: Well appearing, NAD  Abd: exam limited by body habitus but do not appreciate hepatomegaly  Psych: normal mood and affect        Elevated AST (SGOT)  Recheck.  If elevated will discuss further work up  - Hepatic function panel; Future  - Hepatic function panel  - VENOUS COLLECTION    Elevated BP  Asymptomatic  Monitor at " home and recheck at follow up    Follow up based on results

## (undated) DEVICE — DRSG GAUZE 4X4" 3033

## (undated) DEVICE — SOL NACL 0.9% IRRIG 1000ML BOTTLE 2F7124

## (undated) DEVICE — ESU GROUND PAD ADULT W/CORD E7507

## (undated) DEVICE — RAD RX ISOVUE 300 (50ML) 61% IOPAMIDOL CHARGE PER ML

## (undated) DEVICE — NDL CANNULA INTERLINK BLUNT 18GA

## (undated) RX ORDER — ONDANSETRON 2 MG/ML
INJECTION INTRAMUSCULAR; INTRAVENOUS
Status: DISPENSED
Start: 2017-06-02

## (undated) RX ORDER — OXYCODONE AND ACETAMINOPHEN 5; 325 MG/1; MG/1
TABLET ORAL
Status: DISPENSED
Start: 2017-06-02

## (undated) RX ORDER — FENTANYL CITRATE 50 UG/ML
INJECTION, SOLUTION INTRAMUSCULAR; INTRAVENOUS
Status: DISPENSED
Start: 2017-06-02

## (undated) RX ORDER — PROPOFOL 10 MG/ML
INJECTION, EMULSION INTRAVENOUS
Status: DISPENSED
Start: 2017-06-02

## (undated) RX ORDER — DEXAMETHASONE SODIUM PHOSPHATE 4 MG/ML
INJECTION, SOLUTION INTRA-ARTICULAR; INTRALESIONAL; INTRAMUSCULAR; INTRAVENOUS; SOFT TISSUE
Status: DISPENSED
Start: 2017-06-02

## (undated) RX ORDER — LIDOCAINE HYDROCHLORIDE 20 MG/ML
INJECTION, SOLUTION EPIDURAL; INFILTRATION; INTRACAUDAL; PERINEURAL
Status: DISPENSED
Start: 2017-06-02

## (undated) RX ORDER — GLYCOPYRROLATE 0.2 MG/ML
INJECTION, SOLUTION INTRAMUSCULAR; INTRAVENOUS
Status: DISPENSED
Start: 2017-06-02

## (undated) RX ORDER — FENTANYL CITRATE 50 UG/ML
INJECTION, SOLUTION INTRAMUSCULAR; INTRAVENOUS
Status: DISPENSED
Start: 2017-05-23

## (undated) RX ORDER — ONDANSETRON 4 MG/1
TABLET, ORALLY DISINTEGRATING ORAL
Status: DISPENSED
Start: 2017-06-02